# Patient Record
Sex: FEMALE | Race: WHITE | HISPANIC OR LATINO | Employment: FULL TIME | URBAN - METROPOLITAN AREA
[De-identification: names, ages, dates, MRNs, and addresses within clinical notes are randomized per-mention and may not be internally consistent; named-entity substitution may affect disease eponyms.]

---

## 2023-08-24 ENCOUNTER — TELEPHONE (OUTPATIENT)
Dept: PERINATAL CARE | Facility: OTHER | Age: 28
End: 2023-08-24

## 2023-11-20 PROBLEM — Z3A.38 38 WEEKS GESTATION OF PREGNANCY: Status: ACTIVE | Noted: 2023-06-21

## 2023-11-24 PROBLEM — B95.1 POSITIVE GBS TEST: Status: ACTIVE | Noted: 2023-11-24

## 2023-11-27 PROBLEM — Z3A.39 39 WEEKS GESTATION OF PREGNANCY: Status: ACTIVE | Noted: 2023-06-21

## 2023-12-03 ENCOUNTER — HOSPITAL ENCOUNTER (INPATIENT)
Facility: HOSPITAL | Age: 28
LOS: 2 days | Discharge: HOME | DRG: 560 | End: 2023-12-05
Attending: OBSTETRICS & GYNECOLOGY | Admitting: OBSTETRICS & GYNECOLOGY
Payer: COMMERCIAL

## 2023-12-03 ENCOUNTER — ANESTHESIA (INPATIENT)
Dept: ANESTHESIOLOGY | Facility: HOSPITAL | Age: 28
DRG: 560 | End: 2023-12-03
Payer: COMMERCIAL

## 2023-12-03 ENCOUNTER — ANESTHESIA EVENT (INPATIENT)
Dept: LABOR AND DELIVERY | Facility: HOSPITAL | Age: 28
DRG: 560 | End: 2023-12-03
Payer: COMMERCIAL

## 2023-12-03 ENCOUNTER — HOSPITAL ENCOUNTER (OUTPATIENT)
Dept: LABOR AND DELIVERY | Facility: HOSPITAL | Age: 28
Discharge: HOME | DRG: 560 | End: 2023-12-03
Payer: COMMERCIAL

## 2023-12-03 ENCOUNTER — ANESTHESIA EVENT (INPATIENT)
Dept: ANESTHESIOLOGY | Facility: HOSPITAL | Age: 28
DRG: 560 | End: 2023-12-03
Payer: COMMERCIAL

## 2023-12-03 DIAGNOSIS — Z3A.39 39 WEEKS GESTATION OF PREGNANCY: Primary | ICD-10-CM

## 2023-12-03 PROBLEM — O14.13 SEVERE PRE-ECLAMPSIA IN THIRD TRIMESTER: Status: ACTIVE | Noted: 2023-12-03

## 2023-12-03 PROBLEM — R87.612 LGSIL ON PAP SMEAR OF CERVIX: Status: ACTIVE | Noted: 2019-09-24

## 2023-12-03 PROBLEM — Z34.90 ENCOUNTER FOR INDUCTION OF LABOR: Status: ACTIVE | Noted: 2023-12-03

## 2023-12-03 LAB
ABO GROUP BLD: NORMAL
ALBUMIN SERPL BCP-MCNC: 3.4 G/DL (ref 3.5–5)
ALBUMIN SERPL BCP-MCNC: 3.5 G/DL (ref 3.5–5)
ALP SERPL-CCNC: 174 U/L (ref 34–104)
ALP SERPL-CCNC: 183 U/L (ref 34–104)
ALT SERPL W P-5'-P-CCNC: 10 U/L (ref 7–52)
ALT SERPL W P-5'-P-CCNC: 10 U/L (ref 7–52)
ANION GAP SERPL CALCULATED.3IONS-SCNC: 11 MMOL/L
ANION GAP SERPL CALCULATED.3IONS-SCNC: 8 MMOL/L
AST SERPL W P-5'-P-CCNC: 16 U/L (ref 13–39)
AST SERPL W P-5'-P-CCNC: 22 U/L (ref 13–39)
BASE EXCESS BLDCOA CALC-SCNC: -6.2 MMOL/L (ref 3–11)
BASE EXCESS BLDCOV CALC-SCNC: -5 MMOL/L (ref 1–9)
BILIRUB SERPL-MCNC: 0.32 MG/DL (ref 0.2–1)
BILIRUB SERPL-MCNC: 0.44 MG/DL (ref 0.2–1)
BLD GP AB SCN SERPL QL: NEGATIVE
BUN SERPL-MCNC: 10 MG/DL (ref 5–25)
BUN SERPL-MCNC: 10 MG/DL (ref 5–25)
CALCIUM ALBUM COR SERPL-MCNC: 9.4 MG/DL (ref 8.3–10.1)
CALCIUM SERPL-MCNC: 8.9 MG/DL (ref 8.4–10.2)
CALCIUM SERPL-MCNC: 8.9 MG/DL (ref 8.4–10.2)
CHLORIDE SERPL-SCNC: 104 MMOL/L (ref 96–108)
CHLORIDE SERPL-SCNC: 106 MMOL/L (ref 96–108)
CO2 SERPL-SCNC: 17 MMOL/L (ref 21–32)
CO2 SERPL-SCNC: 19 MMOL/L (ref 21–32)
CREAT SERPL-MCNC: 0.52 MG/DL (ref 0.6–1.3)
CREAT SERPL-MCNC: 0.65 MG/DL (ref 0.6–1.3)
CREAT UR-MCNC: 69 MG/DL
ERYTHROCYTE [DISTWIDTH] IN BLOOD BY AUTOMATED COUNT: 14.8 % (ref 11.6–15.1)
ERYTHROCYTE [DISTWIDTH] IN BLOOD BY AUTOMATED COUNT: 14.8 % (ref 11.6–15.1)
GFR SERPL CREATININE-BSD FRML MDRD: 122 ML/MIN/1.73SQ M
GFR SERPL CREATININE-BSD FRML MDRD: 131 ML/MIN/1.73SQ M
GLUCOSE SERPL-MCNC: 101 MG/DL (ref 65–140)
GLUCOSE SERPL-MCNC: 124 MG/DL (ref 65–140)
GLUCOSE SERPL-MCNC: 126 MG/DL (ref 65–140)
GLUCOSE SERPL-MCNC: 64 MG/DL (ref 65–140)
GLUCOSE SERPL-MCNC: 66 MG/DL (ref 65–140)
GLUCOSE SERPL-MCNC: 68 MG/DL (ref 65–140)
GLUCOSE SERPL-MCNC: 86 MG/DL (ref 65–140)
HCO3 BLDCOA-SCNC: 21.1 MMOL/L (ref 17.3–27.3)
HCO3 BLDCOV-SCNC: 21.6 MMOL/L (ref 12.2–28.6)
HCT VFR BLD AUTO: 31.8 % (ref 34.8–46.1)
HCT VFR BLD AUTO: 32.8 % (ref 34.8–46.1)
HGB BLD-MCNC: 10.4 G/DL (ref 11.5–15.4)
HGB BLD-MCNC: 10.6 G/DL (ref 11.5–15.4)
HOLD SPECIMEN: NORMAL
MAGNESIUM SERPL-MCNC: 4.6 MG/DL (ref 1.9–2.7)
MCH RBC QN AUTO: 27.4 PG (ref 26.8–34.3)
MCH RBC QN AUTO: 27.5 PG (ref 26.8–34.3)
MCHC RBC AUTO-ENTMCNC: 32.3 G/DL (ref 31.4–37.4)
MCHC RBC AUTO-ENTMCNC: 32.7 G/DL (ref 31.4–37.4)
MCV RBC AUTO: 84 FL (ref 82–98)
MCV RBC AUTO: 85 FL (ref 82–98)
O2 CT VFR BLDCOA CALC: 12.6 ML/DL
OXYHGB MFR BLDCOA: 54 %
OXYHGB MFR BLDCOV: 56.5 %
PCO2 BLDCOA: 47.7 MM[HG] (ref 30–60)
PCO2 BLDCOV: 45.6 MM HG (ref 27–43)
PH BLDCOA: 7.26 [PH] (ref 7.23–7.43)
PH BLDCOV: 7.29 [PH] (ref 7.19–7.49)
PLATELET # BLD AUTO: 263 THOUSANDS/UL (ref 149–390)
PLATELET # BLD AUTO: 282 THOUSANDS/UL (ref 149–390)
PMV BLD AUTO: 10.4 FL (ref 8.9–12.7)
PMV BLD AUTO: 10.7 FL (ref 8.9–12.7)
PO2 BLDCOA: 24.3 MM HG (ref 5–25)
PO2 BLDCOV: 25.6 MM HG (ref 15–45)
POTASSIUM SERPL-SCNC: 3.6 MMOL/L (ref 3.5–5.3)
POTASSIUM SERPL-SCNC: 4.4 MMOL/L (ref 3.5–5.3)
PROT SERPL-MCNC: 6.6 G/DL (ref 6.4–8.4)
PROT SERPL-MCNC: 6.7 G/DL (ref 6.4–8.4)
PROT UR-MCNC: 33 MG/DL
PROT/CREAT UR: 0.48 MG/G{CREAT} (ref 0–0.1)
RBC # BLD AUTO: 3.8 MILLION/UL (ref 3.81–5.12)
RBC # BLD AUTO: 3.85 MILLION/UL (ref 3.81–5.12)
RH BLD: POSITIVE
SAO2 % BLDCOV: 13 ML/DL
SODIUM SERPL-SCNC: 132 MMOL/L (ref 135–147)
SODIUM SERPL-SCNC: 133 MMOL/L (ref 135–147)
SPECIMEN EXPIRATION DATE: NORMAL
WBC # BLD AUTO: 11.35 THOUSAND/UL (ref 4.31–10.16)
WBC # BLD AUTO: 19.72 THOUSAND/UL (ref 4.31–10.16)

## 2023-12-03 PROCEDURE — 86922 COMPATIBILITY TEST ANTIGLOB: CPT

## 2023-12-03 PROCEDURE — 86850 RBC ANTIBODY SCREEN: CPT | Performed by: OBSTETRICS & GYNECOLOGY

## 2023-12-03 PROCEDURE — 10907ZC DRAINAGE OF AMNIOTIC FLUID, THERAPEUTIC FROM PRODUCTS OF CONCEPTION, VIA NATURAL OR ARTIFICIAL OPENING: ICD-10-PCS | Performed by: OBSTETRICS & GYNECOLOGY

## 2023-12-03 PROCEDURE — 86900 BLOOD TYPING SEROLOGIC ABO: CPT | Performed by: OBSTETRICS & GYNECOLOGY

## 2023-12-03 PROCEDURE — 85027 COMPLETE CBC AUTOMATED: CPT

## 2023-12-03 PROCEDURE — 86921 COMPATIBILITY TEST INCUBATE: CPT

## 2023-12-03 PROCEDURE — 84156 ASSAY OF PROTEIN URINE: CPT

## 2023-12-03 PROCEDURE — 3E033VJ INTRODUCTION OF OTHER HORMONE INTO PERIPHERAL VEIN, PERCUTANEOUS APPROACH: ICD-10-PCS | Performed by: OBSTETRICS & GYNECOLOGY

## 2023-12-03 PROCEDURE — 80053 COMPREHEN METABOLIC PANEL: CPT

## 2023-12-03 PROCEDURE — 82570 ASSAY OF URINE CREATININE: CPT

## 2023-12-03 PROCEDURE — 82805 BLOOD GASES W/O2 SATURATION: CPT | Performed by: OBSTETRICS & GYNECOLOGY

## 2023-12-03 PROCEDURE — 88307 TISSUE EXAM BY PATHOLOGIST: CPT | Performed by: PATHOLOGY

## 2023-12-03 PROCEDURE — 4A1HXCZ MONITORING OF PRODUCTS OF CONCEPTION, CARDIAC RATE, EXTERNAL APPROACH: ICD-10-PCS | Performed by: OBSTETRICS & GYNECOLOGY

## 2023-12-03 PROCEDURE — 83735 ASSAY OF MAGNESIUM: CPT

## 2023-12-03 PROCEDURE — 82948 REAGENT STRIP/BLOOD GLUCOSE: CPT

## 2023-12-03 PROCEDURE — 59409 OBSTETRICAL CARE: CPT | Performed by: OBSTETRICS & GYNECOLOGY

## 2023-12-03 PROCEDURE — 86780 TREPONEMA PALLIDUM: CPT

## 2023-12-03 PROCEDURE — 86901 BLOOD TYPING SEROLOGIC RH(D): CPT | Performed by: OBSTETRICS & GYNECOLOGY

## 2023-12-03 PROCEDURE — NC001 PR NO CHARGE: Performed by: OBSTETRICS & GYNECOLOGY

## 2023-12-03 RX ORDER — OXYTOCIN/RINGER'S LACTATE 30/500 ML
250 PLASTIC BAG, INJECTION (ML) INTRAVENOUS ONCE
Status: DISCONTINUED | OUTPATIENT
Start: 2023-12-03 | End: 2023-12-05 | Stop reason: HOSPADM

## 2023-12-03 RX ORDER — ESCITALOPRAM OXALATE 10 MG/1
10 TABLET ORAL DAILY
Status: DISCONTINUED | OUTPATIENT
Start: 2023-12-03 | End: 2023-12-05 | Stop reason: HOSPADM

## 2023-12-03 RX ORDER — DOCUSATE SODIUM 100 MG/1
100 CAPSULE, LIQUID FILLED ORAL 2 TIMES DAILY
Status: DISCONTINUED | OUTPATIENT
Start: 2023-12-03 | End: 2023-12-05 | Stop reason: HOSPADM

## 2023-12-03 RX ORDER — MAGNESIUM SULFATE HEPTAHYDRATE 40 MG/ML
4 INJECTION, SOLUTION INTRAVENOUS ONCE
Status: COMPLETED | OUTPATIENT
Start: 2023-12-03 | End: 2023-12-03

## 2023-12-03 RX ORDER — ALBUTEROL SULFATE 90 UG/1
2 AEROSOL, METERED RESPIRATORY (INHALATION) EVERY 4 HOURS PRN
Status: DISCONTINUED | OUTPATIENT
Start: 2023-12-03 | End: 2023-12-05 | Stop reason: HOSPADM

## 2023-12-03 RX ORDER — DIPHENHYDRAMINE HCL 25 MG
25 TABLET ORAL EVERY 6 HOURS PRN
Status: DISCONTINUED | OUTPATIENT
Start: 2023-12-03 | End: 2023-12-05 | Stop reason: HOSPADM

## 2023-12-03 RX ORDER — IBUPROFEN 600 MG/1
600 TABLET ORAL EVERY 6 HOURS
Status: DISCONTINUED | OUTPATIENT
Start: 2023-12-03 | End: 2023-12-05 | Stop reason: HOSPADM

## 2023-12-03 RX ORDER — ONDANSETRON 2 MG/ML
4 INJECTION INTRAMUSCULAR; INTRAVENOUS EVERY 4 HOURS PRN
Status: DISCONTINUED | OUTPATIENT
Start: 2023-12-03 | End: 2023-12-05 | Stop reason: HOSPADM

## 2023-12-03 RX ORDER — SIMETHICONE 80 MG
80 TABLET,CHEWABLE ORAL 4 TIMES DAILY PRN
Status: DISCONTINUED | OUTPATIENT
Start: 2023-12-03 | End: 2023-12-05 | Stop reason: HOSPADM

## 2023-12-03 RX ORDER — OXYCODONE HYDROCHLORIDE 5 MG/1
5 TABLET ORAL
Status: DISCONTINUED | OUTPATIENT
Start: 2023-12-03 | End: 2023-12-05 | Stop reason: HOSPADM

## 2023-12-03 RX ORDER — FENTANYL CITRATE 50 UG/ML
INJECTION, SOLUTION INTRAMUSCULAR; INTRAVENOUS
Status: COMPLETED
Start: 2023-12-03 | End: 2023-12-03

## 2023-12-03 RX ORDER — SODIUM CHLORIDE, SODIUM LACTATE, POTASSIUM CHLORIDE, CALCIUM CHLORIDE 600; 310; 30; 20 MG/100ML; MG/100ML; MG/100ML; MG/100ML
125 INJECTION, SOLUTION INTRAVENOUS CONTINUOUS
Status: DISCONTINUED | OUTPATIENT
Start: 2023-12-03 | End: 2023-12-05 | Stop reason: HOSPADM

## 2023-12-03 RX ORDER — MAGNESIUM SULFATE HEPTAHYDRATE 40 MG/ML
2 INJECTION, SOLUTION INTRAVENOUS CONTINUOUS
Status: DISPENSED | OUTPATIENT
Start: 2023-12-03 | End: 2023-12-04

## 2023-12-03 RX ORDER — LABETALOL HYDROCHLORIDE 5 MG/ML
20 INJECTION, SOLUTION INTRAVENOUS ONCE
Status: COMPLETED | OUTPATIENT
Start: 2023-12-03 | End: 2023-12-03

## 2023-12-03 RX ORDER — SENNOSIDES 8.6 MG
1 TABLET ORAL DAILY
Status: DISCONTINUED | OUTPATIENT
Start: 2023-12-04 | End: 2023-12-05 | Stop reason: HOSPADM

## 2023-12-03 RX ORDER — LIDOCAINE HYDROCHLORIDE AND EPINEPHRINE 15; 5 MG/ML; UG/ML
INJECTION, SOLUTION EPIDURAL
Status: COMPLETED | OUTPATIENT
Start: 2023-12-03 | End: 2023-12-03

## 2023-12-03 RX ORDER — CALCIUM GLUCONATE 94 MG/ML
1 INJECTION, SOLUTION INTRAVENOUS ONCE AS NEEDED
Status: DISCONTINUED | OUTPATIENT
Start: 2023-12-03 | End: 2023-12-05 | Stop reason: HOSPADM

## 2023-12-03 RX ORDER — BUPIVACAINE HYDROCHLORIDE 2.5 MG/ML
30 INJECTION, SOLUTION EPIDURAL; INFILTRATION; INTRACAUDAL ONCE AS NEEDED
Status: DISCONTINUED | OUTPATIENT
Start: 2023-12-03 | End: 2023-12-05 | Stop reason: HOSPADM

## 2023-12-03 RX ORDER — OXYTOCIN/RINGER'S LACTATE 30/500 ML
1-30 PLASTIC BAG, INJECTION (ML) INTRAVENOUS
Status: DISCONTINUED | OUTPATIENT
Start: 2023-12-03 | End: 2023-12-03

## 2023-12-03 RX ORDER — ROPIVACAINE HYDROCHLORIDE 2 MG/ML
INJECTION, SOLUTION EPIDURAL; INFILTRATION; PERINEURAL CONTINUOUS PRN
Status: DISCONTINUED | OUTPATIENT
Start: 2023-12-03 | End: 2023-12-03 | Stop reason: HOSPADM

## 2023-12-03 RX ORDER — MAGNESIUM SULFATE HEPTAHYDRATE 40 MG/ML
2 INJECTION, SOLUTION INTRAVENOUS ONCE
Status: COMPLETED | OUTPATIENT
Start: 2023-12-03 | End: 2023-12-03

## 2023-12-03 RX ORDER — CALCIUM CARBONATE 500 MG/1
1000 TABLET, CHEWABLE ORAL DAILY PRN
Status: DISCONTINUED | OUTPATIENT
Start: 2023-12-03 | End: 2023-12-05 | Stop reason: HOSPADM

## 2023-12-03 RX ORDER — FENTANYL CITRATE 50 UG/ML
INJECTION, SOLUTION INTRAMUSCULAR; INTRAVENOUS AS NEEDED
Status: DISCONTINUED | OUTPATIENT
Start: 2023-12-03 | End: 2023-12-03 | Stop reason: HOSPADM

## 2023-12-03 RX ORDER — ACETAMINOPHEN 325 MG/1
650 TABLET ORAL EVERY 4 HOURS PRN
Status: DISCONTINUED | OUTPATIENT
Start: 2023-12-03 | End: 2023-12-05 | Stop reason: HOSPADM

## 2023-12-03 RX ORDER — ROPIVACAINE HYDROCHLORIDE 2 MG/ML
INJECTION, SOLUTION EPIDURAL; INFILTRATION; PERINEURAL AS NEEDED
Status: DISCONTINUED | OUTPATIENT
Start: 2023-12-03 | End: 2023-12-03 | Stop reason: HOSPADM

## 2023-12-03 RX ORDER — DIAPER,BRIEF,INFANT-TODD,DISP
1 EACH MISCELLANEOUS DAILY PRN
Status: DISCONTINUED | OUTPATIENT
Start: 2023-12-03 | End: 2023-12-05 | Stop reason: HOSPADM

## 2023-12-03 RX ADMIN — ROPIVACAINE HYDROCHLORIDE 5 ML: 2 INJECTION, SOLUTION EPIDURAL; INFILTRATION; PERINEURAL at 18:52

## 2023-12-03 RX ADMIN — SODIUM CHLORIDE 2.5 MILLION UNITS: 9 INJECTION, SOLUTION INTRAVENOUS at 17:24

## 2023-12-03 RX ADMIN — MAGNESIUM SULFATE IN WATER 4 G: 40 INJECTION, SOLUTION INTRAVENOUS at 19:02

## 2023-12-03 RX ADMIN — BENZOCAINE AND LEVOMENTHOL 1 APPLICATION: 200; 5 SPRAY TOPICAL at 22:22

## 2023-12-03 RX ADMIN — SODIUM CHLORIDE 2.5 MILLION UNITS: 9 INJECTION, SOLUTION INTRAVENOUS at 13:34

## 2023-12-03 RX ADMIN — FENTANYL CITRATE 100 MCG: 50 INJECTION INTRAMUSCULAR; INTRAVENOUS at 15:48

## 2023-12-03 RX ADMIN — SODIUM CHLORIDE, SODIUM LACTATE, POTASSIUM CHLORIDE, AND CALCIUM CHLORIDE 125 ML/HR: .6; .31; .03; .02 INJECTION, SOLUTION INTRAVENOUS at 09:08

## 2023-12-03 RX ADMIN — ROPIVACAINE HYDROCHLORIDE 7 ML/HR: 2 INJECTION, SOLUTION EPIDURAL; INFILTRATION at 13:37

## 2023-12-03 RX ADMIN — ROPIVACAINE HYDROCHLORIDE 4 ML: 2 INJECTION, SOLUTION EPIDURAL; INFILTRATION; PERINEURAL at 15:50

## 2023-12-03 RX ADMIN — ROPIVACAINE HYDROCHLORIDE: 2 INJECTION, SOLUTION EPIDURAL; INFILTRATION at 13:42

## 2023-12-03 RX ADMIN — ROPIVACAINE HYDROCHLORIDE 4 ML: 2 INJECTION, SOLUTION EPIDURAL; INFILTRATION; PERINEURAL at 13:34

## 2023-12-03 RX ADMIN — LIDOCAINE HYDROCHLORIDE AND EPINEPHRINE 2 ML: 15; 5 INJECTION, SOLUTION EPIDURAL at 13:31

## 2023-12-03 RX ADMIN — WITCH HAZEL 1 PAD: 500 SOLUTION RECTAL; TOPICAL at 22:22

## 2023-12-03 RX ADMIN — Medication 2 MILLI-UNITS/MIN: at 10:01

## 2023-12-03 RX ADMIN — LABETALOL HYDROCHLORIDE 20 MG: 5 INJECTION, SOLUTION INTRAVENOUS at 19:02

## 2023-12-03 RX ADMIN — ROPIVACAINE HYDROCHLORIDE 3 ML: 2 INJECTION, SOLUTION EPIDURAL; INFILTRATION; PERINEURAL at 13:31

## 2023-12-03 RX ADMIN — SODIUM CHLORIDE 5 MILLION UNITS: 0.9 INJECTION, SOLUTION INTRAVENOUS at 09:17

## 2023-12-03 RX ADMIN — DOCUSATE SODIUM 100 MG: 100 CAPSULE, LIQUID FILLED ORAL at 21:03

## 2023-12-03 RX ADMIN — ROPIVACAINE HYDROCHLORIDE 5 ML: 2 INJECTION, SOLUTION EPIDURAL; INFILTRATION; PERINEURAL at 18:54

## 2023-12-03 RX ADMIN — ROPIVACAINE HYDROCHLORIDE: 2 INJECTION, SOLUTION EPIDURAL; INFILTRATION at 18:17

## 2023-12-03 RX ADMIN — LIDOCAINE HYDROCHLORIDE AND EPINEPHRINE 3 ML: 15; 5 INJECTION, SOLUTION EPIDURAL at 13:28

## 2023-12-03 RX ADMIN — ROPIVACAINE HYDROCHLORIDE 4 ML: 2 INJECTION, SOLUTION EPIDURAL; INFILTRATION; PERINEURAL at 15:48

## 2023-12-03 RX ADMIN — IBUPROFEN 600 MG: 600 TABLET, FILM COATED ORAL at 20:42

## 2023-12-03 RX ADMIN — ONDANSETRON 4 MG: 2 INJECTION INTRAMUSCULAR; INTRAVENOUS at 11:09

## 2023-12-03 RX ADMIN — ESCITALOPRAM OXALATE 10 MG: 10 TABLET ORAL at 09:17

## 2023-12-03 RX ADMIN — MAGNESIUM SULFATE HEPTAHYDRATE 2 G: 40 INJECTION, SOLUTION INTRAVENOUS at 19:33

## 2023-12-03 RX ADMIN — MAGNESIUM SULFATE IN WATER 2 G/HR: 20 INJECTION, SOLUTION INTRAVENOUS at 19:46

## 2023-12-03 NOTE — OB LABOR/OXYTOCIN SAFETY PROGRESS
Oxytocin Safety Progress Check Note - Bob Loya 33 y.o. female MRN: [id number]    Unit/Bed#: L&D 324-01 Encounter: [id number]    Dose (courtney-units/min) Oxytocin: 8 courtney-units/min  Contraction Frequency (minutes): 1-3  Contraction Quality: Moderate  Tachysystole: No   Cervical Dilation: 5-6        Cervical Effacement: 80  Fetal Station: -1  Baseline Rate: 145 bpm     FHR Category: 1               Vital Signs:   Vitals:    12/03/23 1500   BP:    Pulse:    Resp: 18   Temp: 98.1 °F (36.7 °C)       Notes/comments:   Patient uncomfortable with pressure.  Calling anesthesia to redose epidural.      Andreia Briggs MD 12/3/2023 3:40 PM

## 2023-12-03 NOTE — H&P
[address] 33 y.o. female MRN: [id number]  Unit/Bed#: L&D 324-01 Encounter: [id number]    Assessment: 33 y.o.  at 39w6d admitted for induction of labor. SVE: Cervical Dilation: 4  Cervical Effacement: 60  Cervical Consistency: Soft  Fetal Station: -2  Position: Unknown  FHT:  Clinical EFW: 76% ; Cephalic confirmed by ultrasound  GBS status: pos   Postpartum contraception plan: OCPs    Plan:   Encounter for induction of labor  Assessment & Plan  Admit   T&S, CBC, RPR  CLD  IV fluids  GBS prophylaxis is needed, PCN ordered  Induction with pitocin      Diet controlled gestational diabetes mellitus (GDM) in third trimester  Assessment & Plan  Lab Results   Component Value Date    HGBA1C 4.9 2022       Recent Labs     23  0913   POCGLU 124       Blood Sugar Average: Last 72 hrs:  (P) 124    As per patient, blood sugars were well controlled at home, but was not compliant with reporting them  First ; recheck in 1 hour    Obesity affecting pregnancy in third trimester  Assessment & Plan  BMI 42    Depression affecting pregnancy in third trimester, antepartum  Assessment & Plan  Home lexapro ordered    39 weeks gestation of pregnancy  Assessment & Plan  -Prenatal panel completed  -28 week labs completed  -EFW 76%    LGSIL on Pap smear of cervix  Assessment & Plan  Colpo on 23: Lesions appeared low grade, no biopsies taken      Discussed case and plan w/ Dr. Loan Finch      Chief Complaint: here for induction of labor    HPI: Aniya Keene is a 33 y.o. Carol Evens with an SOUMYA of 2023, by Ultrasound at 39w6d who is being admitted for induction. She denies having uterine contractions, has no LOF, and reports no VB. She states she has felt good FM. She states that she was not complaint with reporting blood sugars, but they were normal at home. She would like an epidural before AROM. Patient Active Problem List   Diagnosis    Obesity (BMI 30-39. 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 of cervix    Migraine without aura and without status migrainosus, not intractable    Chronic midline low back pain without sciatica    Chronic fatigue    Dry eyes    Vitamin D deficiency    Nephrocalcinosis    Anxiety    Myofascial pain    Bronchitis    39 weeks gestation of pregnancy    Depression affecting pregnancy in third trimester, antepartum    Obesity affecting pregnancy in third trimester    Diet controlled gestational diabetes mellitus (GDM) in third trimester    Positive GBS test    Encounter for induction of labor       Baby complications/comments: none    Review of Systems   Constitutional:  Negative for chills and fever. HENT:  Negative for ear pain and sore throat. Eyes:  Negative for pain and visual disturbance. Respiratory:  Negative for cough and shortness of breath. Cardiovascular:  Negative for chest pain, palpitations and leg swelling. Gastrointestinal:  Negative for abdominal pain, nausea and vomiting. Genitourinary:  Negative for dysuria, hematuria, pelvic pain, urgency, vaginal bleeding and vaginal discharge. Musculoskeletal:  Negative for arthralgias and back pain. Skin:  Negative for color change and rash. Neurological:  Negative for dizziness, seizures, syncope, light-headedness and headaches. All other systems reviewed and are negative.       OB Hx:  OB History    Para Term  AB Living   3 1 1   1 1   SAB IAB Ectopic Multiple Live Births     1     1      # Outcome Date GA Lbr Cornelius/2nd Weight Sex Delivery Anes PTL Lv   3 Current            2 Term 12 37w0d  2920 g (6 lb 7 oz) F Vag-Spont EPI N LAURA   1 IAB               Obstetric Comments   Menarche 12   Cycles Q 28d   Vaginal birth x 1       Past Medical Hx:  Past Medical History:   Diagnosis Date    Abnormal Pap smear of cervix     2021 Colpo: TORRES 1; 2022 pap negative    Depression     Frequent headaches     Genetic screening     2023-CFCS negative    Immunization, varicella     Migraine      (normal spontaneous vaginal delivery)     Varicella     states she was vaccinated but blood work from 3/2021 says non immune       Past Surgical hx:  Past Surgical History:   Procedure Laterality Date    INDUCED          Social Hx:  Alcohol use: no  Tobacco use: no  Other substance use: no    No Known Allergies    Medications Prior to Admission   Medication    acetaminophen (TYLENOL) 500 mg tablet    albuterol (Ventolin HFA) 90 mcg/act inhaler    aspirin (ECOTRIN LOW STRENGTH) 81 mg EC tablet    Blood Glucose Monitoring Suppl (OneTouch Verio Reflect) w/Device KIT    cyclobenzaprine (FLEXERIL) 10 mg tablet    escitalopram (Lexapro) 10 mg tablet    ferrous sulfate 324 (65 Fe) mg    glucose blood (OneTouch Verio) test strip    loratadine (CLARITIN) 10 mg tablet    melatonin 3 mg    ondansetron (ZOFRAN-ODT) 4 mg disintegrating tablet    OneTouch Delica Lancets 90N MISC    Prenatal Vit-Fe Fumarate-FA (Trinatal Rx 1) 60-1 MG TABS       Objective:  Temp:  [98.1 °F (36.7 °C)] 98.1 °F (36.7 °C)  HR:  [] 96  Resp:  [18] 18  BP: (124-141)/(83-90) 124/83  There is no height or weight on file to calculate BMI. Physical Exam:  Physical Exam  Constitutional:       Appearance: Normal appearance. Cardiovascular:      Rate and Rhythm: Normal rate and regular rhythm. Heart sounds: No murmur heard. No friction rub. No gallop. Pulmonary:      Effort: Pulmonary effort is normal. No respiratory distress. Breath sounds: No wheezing. Abdominal:      Palpations: Abdomen is soft. Tenderness: There is no abdominal tenderness. Musculoskeletal:         General: No swelling or tenderness. Neurological:      Mental Status: She is alert and oriented to person, place, and time. Skin:     General: Skin is warm and dry. Psychiatric:         Mood and Affect: Mood normal.   Vitals reviewed.             FHT:  Baseline Rate: 150 bpm  FHR Category: Category I    TOCO:   Contraction Frequency (minutes): irregular  Contraction Duration (seconds): irregular  Contraction Quality: Mild    Lab Results   Component Value Date    WBC 11.35 (H) 12/03/2023    HGB 10.4 (L) 12/03/2023    HCT 31.8 (L) 12/03/2023     12/03/2023     Lab Results   Component Value Date    K 3.6 12/21/2022     12/21/2022    CO2 28 12/21/2022    BUN 15 12/21/2022    CREATININE 0.63 12/21/2022    AST 18 04/04/2022    ALT 25 04/04/2022     Prenatal Labs: Reviewed      Bloodtype:B  Rh Factor (no units)   Date Value   04/28/2023 Positive     Strep Grp B PCR (no units)   Date Value   11/20/2023 Positive (A)     HIV-1/HIV-2 Ab (no units)   Date Value   11/15/2022 Non-Reactive     Rubella IgG Quant (IU/mL)   Date Value   04/28/2023 37.1     RPR NR  RPR (no units)   Date Value   11/15/2022 Non-Reactive     Hepatitis B Surface Ag (no units)   Date Value   04/28/2023 Non-reactive     Chlamydia, DNA Probe (no units)   Date Value   10/24/2018 C. trachomatis Amplified DNA Negative     Chlamydia trachomatis, DNA Probe (no units)   Date Value   05/17/2023 Negative     N gonorrhoeae, DNA Probe (no units)   Date Value   05/17/2023 Negative   10/24/2018 N. gonorrhoeae Amplified DNA Negative     Glucose (mg/dL)   Date Value   09/22/2023 180 (H)     GLUCOSE 3 HOUR: elevated  Platelets (Thousands/uL)   Date Value   12/03/2023 282   09/22/2023 251   04/28/2023 283   04/04/2022 251     Hemoglobin (g/dL)   Date Value   12/03/2023 10.4 (L)   09/22/2023 10.0 (L)   04/28/2023 11.4 (L)   04/04/2022 11.1 (L)     >2 Midnights  INPATIENT       Signature/Title:  Andreia Briggs MD  Date: 12/3/2023  Time: 9:47 AM

## 2023-12-03 NOTE — ANESTHESIA PREPROCEDURE EVALUATION
Procedure:  LABOR ANALGESIA    Relevant Problems   CARDIO   (+) Migraine without aura and without status migrainosus, not intractable      /RENAL   (+) Nephrocalcinosis      GYN   (+) 39 weeks gestation of pregnancy      MUSCULOSKELETAL   (+) Chronic midline low back pain without sciatica      NEURO/PSYCH   (+) Anxiety   (+) Chronic midline low back pain without sciatica   (+) Depression affecting pregnancy in third trimester, antepartum   (+) Migraine without aura and without status migrainosus, not intractable      Other   (+) Morbid obesity with BMI of 40.0-44.9, adult (HCC)        Physical Exam    Airway    Mallampati score: II  TM Distance: >3 FB  Neck ROM: full     Dental   No notable dental hx     Cardiovascular  Cardiovascular exam normal    Pulmonary      Other Findings  post-pubertal.      Anesthesia Plan  ASA Score- 3     Anesthesia Type-     Plan Factors-    Chart reviewed. Existing labs reviewed. Patient summary reviewed. Induction-     Postoperative Plan-     Informed Consent- Anesthetic plan and risks discussed with patient.

## 2023-12-03 NOTE — ASSESSMENT & PLAN NOTE
Admit   T&S, CBC, RPR  CLD  IV fluids  GBS prophylaxis is needed, PCN ordered  Induction with pitocin

## 2023-12-03 NOTE — ANESTHESIA PROCEDURE NOTES
Epidural Block    Patient location during procedure: OB/L&D  Start time: 12/3/2023 1:28 PM  Reason for block: at surgeon's request and primary anesthetic  Staffing  Performed by: Kia Eubanks DO  Authorized by: Kia Eubanks DO    Preanesthetic Checklist  Completed: patient identified, IV checked, risks and benefits discussed, surgical consent, monitors and equipment checked, pre-op evaluation and timeout performed  Epidural  Patient position: sitting  Prep: Betadine  Sedation Level: no sedation  Patient monitoring: frequent blood pressure checks, continuous pulse oximetry and heart rate  Approach: midline  Location: lumbar, L3-4  Injection technique: BIRD saline  Needle  Needle type: Tuohy   Needle gauge: 18 G  Needle insertion depth: 6 cm  Catheter type: multi-orifice  Catheter size: 20 G  Catheter at skin depth: 10 cm  Catheter securement method: stabilization device and clear occlusive dressing  Test dose: negativelidocaine-epinephrine (XYLOCAINE-MPF/EPINEPHRINE) 1.5 %-1:200,000 injection 3 mL - Epidural   3 mL - 12/3/2023 1:28:00 PM  Assessment  Sensory level: T10  Number of attempts: 1negative aspiration for CSF, negative aspiration for heme and no paresthesia on injection  patient tolerated the procedure well with no immediate complications

## 2023-12-03 NOTE — OB LABOR/OXYTOCIN SAFETY PROGRESS
Oxytocin Safety Progress Check Note - Hamilton Daniela 33 y.o. female MRN: [id number]    Unit/Bed#: L&D 324-01 Encounter: [id number]    Dose (courtney-units/min) Oxytocin: 8 courtney-units/min  Contraction Frequency (minutes): 2-4  Contraction Quality: Moderate  Tachysystole: No   Cervical Dilation: 4-5        Cervical Effacement: 70  Fetal Station: -1  Baseline Rate: 150 bpm     FHR Category: 1               Vital Signs:   Vitals:    12/03/23 1142   BP: 131/91   Pulse: 85   Resp:    Temp:        Notes/comments:   Patient desires epidural. Wait on AROM until 2 units of blood arrive to the hospital campus.       Vernon Felder MD 12/3/2023 12:50 PM

## 2023-12-03 NOTE — OB LABOR/OXYTOCIN SAFETY PROGRESS
Oxytocin Safety Progress Check Note - Aguila Boles 33 y.o. female MRN: [id number]    Unit/Bed#: L&D 324-01 Encounter: [id number]    Dose (courtney-units/min) Oxytocin: 8 courtney-units/min  Contraction Frequency (minutes): 1-3  Contraction Quality: Moderate  Tachysystole: No   Cervical Dilation: 8        Cervical Effacement: 80  Fetal Station: -1  Baseline Rate: 145 bpm     FHR Category: Cat 1               Vital Signs:   Vitals:    12/03/23 1823   BP: 149/70   Pulse: (!) 107   Resp:    Temp:        Notes/comments:   SVE as above. Patient feeling more pelvic pressure. Anesthesia has previously evaluated her for her low back pain and pelvic pressure. Nonsustained severe range blood pressures noted prior to SVE. Reviewed potential of progression to preeclampsia with severe features and management options.   Discussed with Dr. Favio Martinez DO 12/3/2023 6:40 PM

## 2023-12-03 NOTE — OB LABOR/OXYTOCIN SAFETY PROGRESS
Oxytocin Safety Progress Check Note - Valentine Peer 33 y.o. female MRN: [id number]    Unit/Bed#: L&D 324-01 Encounter: [id number]    Dose (courtney-units/min) Oxytocin: 8 courtney-units/min  Contraction Frequency (minutes): 1-3  Contraction Quality: Moderate  Tachysystole: No   Cervical Dilation: 5        Cervical Effacement: 80  Fetal Station: -1  Baseline Rate: 150 bpm     FHR Category: 1               Vital Signs:   Vitals:    12/03/23 1344   BP: 139/82   Pulse: 100   Resp: 16   Temp: 97.7 °F (36.5 °C)       Notes/comments:   Continue titrating pitocin. AROM meconium. 2 units of blood are now available for patient.       Catie Gonzalez MD 12/3/2023 2:23 PM

## 2023-12-03 NOTE — ANESTHESIA PREPROCEDURE EVALUATION
Procedure:  LABOR INDUCTION    Relevant Problems   CARDIO   (+) Migraine without aura and without status migrainosus, not intractable      /RENAL   (+) Nephrocalcinosis      GYN   (+) 39 weeks gestation of pregnancy      MUSCULOSKELETAL   (+) Chronic midline low back pain without sciatica      NEURO/PSYCH   (+) Anxiety   (+) Chronic midline low back pain without sciatica   (+) Depression affecting pregnancy in third trimester, antepartum   (+) Migraine without aura and without status migrainosus, not intractable      Other   (+) Morbid obesity with BMI of 40.0-44.9, adult [facility])        Physical Exam    Airway    Mallampati score: II  TM Distance: >3 FB  Neck ROM: full     Dental   No notable dental hx     Cardiovascular  Rhythm: regular, Rate: normal, Cardiovascular exam normal    Pulmonary  Pulmonary exam normal Breath sounds clear to auscultation    Other Findings  post-pubertal.      Anesthesia Plan  ASA Score- 3     Anesthesia Type-     Plan Factors-    Chart reviewed. Existing labs reviewed. Patient summary reviewed. Induction-     Postoperative Plan-     Informed Consent- Anesthetic plan and risks discussed with patient.

## 2023-12-03 NOTE — ASSESSMENT & PLAN NOTE
Lab Results   Component Value Date    HGBA1C 4.9 03/30/2022       Recent Labs     12/03/23  1016 12/03/23  1222 12/03/23  1414 12/03/23  1625   POCGLU 86 68 64* 66       Blood Sugar Average: Last 72 hrs:  (P) 81.6    F/u outpatient

## 2023-12-04 DIAGNOSIS — Z13.1 SCREENING FOR DIABETES MELLITUS (DM): ICD-10-CM

## 2023-12-04 DIAGNOSIS — Z86.32 HISTORY OF GESTATIONAL DIABETES MELLITUS (GDM): Primary | ICD-10-CM

## 2023-12-04 LAB
ALBUMIN SERPL BCP-MCNC: 3.1 G/DL (ref 3.5–5)
ALBUMIN SERPL BCP-MCNC: 3.2 G/DL (ref 3.5–5)
ALBUMIN SERPL BCP-MCNC: 3.2 G/DL (ref 3.5–5)
ALBUMIN SERPL BCP-MCNC: 3.5 G/DL (ref 3.5–5)
ALP SERPL-CCNC: 129 U/L (ref 34–104)
ALP SERPL-CCNC: 147 U/L (ref 34–104)
ALP SERPL-CCNC: 151 U/L (ref 34–104)
ALP SERPL-CCNC: 165 U/L (ref 34–104)
ALT SERPL W P-5'-P-CCNC: 11 U/L (ref 7–52)
ALT SERPL W P-5'-P-CCNC: 7 U/L (ref 7–52)
ALT SERPL W P-5'-P-CCNC: 9 U/L (ref 7–52)
ALT SERPL W P-5'-P-CCNC: 9 U/L (ref 7–52)
ANION GAP SERPL CALCULATED.3IONS-SCNC: 11 MMOL/L
ANION GAP SERPL CALCULATED.3IONS-SCNC: 6 MMOL/L
ANION GAP SERPL CALCULATED.3IONS-SCNC: 8 MMOL/L
ANION GAP SERPL CALCULATED.3IONS-SCNC: 8 MMOL/L
AST SERPL W P-5'-P-CCNC: 16 U/L (ref 13–39)
AST SERPL W P-5'-P-CCNC: 16 U/L (ref 13–39)
AST SERPL W P-5'-P-CCNC: 18 U/L (ref 13–39)
AST SERPL W P-5'-P-CCNC: 18 U/L (ref 13–39)
BILIRUB SERPL-MCNC: 0.22 MG/DL (ref 0.2–1)
BILIRUB SERPL-MCNC: 0.23 MG/DL (ref 0.2–1)
BILIRUB SERPL-MCNC: 0.27 MG/DL (ref 0.2–1)
BILIRUB SERPL-MCNC: 0.31 MG/DL (ref 0.2–1)
BUN SERPL-MCNC: 8 MG/DL (ref 5–25)
BUN SERPL-MCNC: 9 MG/DL (ref 5–25)
CALCIUM ALBUM COR SERPL-MCNC: 7.4 MG/DL (ref 8.3–10.1)
CALCIUM ALBUM COR SERPL-MCNC: 7.9 MG/DL (ref 8.3–10.1)
CALCIUM ALBUM COR SERPL-MCNC: 8 MG/DL (ref 8.3–10.1)
CALCIUM SERPL-MCNC: 6.8 MG/DL (ref 8.4–10.2)
CALCIUM SERPL-MCNC: 7.3 MG/DL (ref 8.4–10.2)
CALCIUM SERPL-MCNC: 7.3 MG/DL (ref 8.4–10.2)
CALCIUM SERPL-MCNC: 8.2 MG/DL (ref 8.4–10.2)
CHLORIDE SERPL-SCNC: 104 MMOL/L (ref 96–108)
CHLORIDE SERPL-SCNC: 106 MMOL/L (ref 96–108)
CHLORIDE SERPL-SCNC: 106 MMOL/L (ref 96–108)
CHLORIDE SERPL-SCNC: 107 MMOL/L (ref 96–108)
CO2 SERPL-SCNC: 19 MMOL/L (ref 21–32)
CO2 SERPL-SCNC: 19 MMOL/L (ref 21–32)
CO2 SERPL-SCNC: 23 MMOL/L (ref 21–32)
CO2 SERPL-SCNC: 23 MMOL/L (ref 21–32)
CREAT SERPL-MCNC: 0.59 MG/DL (ref 0.6–1.3)
CREAT SERPL-MCNC: 0.64 MG/DL (ref 0.6–1.3)
CREAT SERPL-MCNC: 0.79 MG/DL (ref 0.6–1.3)
CREAT SERPL-MCNC: 0.91 MG/DL (ref 0.6–1.3)
ERYTHROCYTE [DISTWIDTH] IN BLOOD BY AUTOMATED COUNT: 14.7 % (ref 11.6–15.1)
ERYTHROCYTE [DISTWIDTH] IN BLOOD BY AUTOMATED COUNT: 14.7 % (ref 11.6–15.1)
ERYTHROCYTE [DISTWIDTH] IN BLOOD BY AUTOMATED COUNT: 14.8 % (ref 11.6–15.1)
ERYTHROCYTE [DISTWIDTH] IN BLOOD BY AUTOMATED COUNT: 14.9 % (ref 11.6–15.1)
GFR SERPL CREATININE-BSD FRML MDRD: 102 ML/MIN/1.73SQ M
GFR SERPL CREATININE-BSD FRML MDRD: 122 ML/MIN/1.73SQ M
GFR SERPL CREATININE-BSD FRML MDRD: 126 ML/MIN/1.73SQ M
GFR SERPL CREATININE-BSD FRML MDRD: 86 ML/MIN/1.73SQ M
GLUCOSE SERPL-MCNC: 119 MG/DL (ref 65–140)
GLUCOSE SERPL-MCNC: 128 MG/DL (ref 65–140)
GLUCOSE SERPL-MCNC: 133 MG/DL (ref 65–140)
GLUCOSE SERPL-MCNC: 269 MG/DL (ref 65–140)
GLUCOSE SERPL-MCNC: 90 MG/DL (ref 65–140)
HCT VFR BLD AUTO: 28.3 % (ref 34.8–46.1)
HCT VFR BLD AUTO: 29.1 % (ref 34.8–46.1)
HCT VFR BLD AUTO: 30 % (ref 34.8–46.1)
HCT VFR BLD AUTO: 31.3 % (ref 34.8–46.1)
HGB BLD-MCNC: 10 G/DL (ref 11.5–15.4)
HGB BLD-MCNC: 10.6 G/DL (ref 11.5–15.4)
HGB BLD-MCNC: 9.4 G/DL (ref 11.5–15.4)
HGB BLD-MCNC: 9.5 G/DL (ref 11.5–15.4)
MAGNESIUM SERPL-MCNC: 4.8 MG/DL (ref 1.9–2.7)
MAGNESIUM SERPL-MCNC: 5 MG/DL (ref 1.9–2.7)
MAGNESIUM SERPL-MCNC: 5.2 MG/DL (ref 1.9–2.7)
MAGNESIUM SERPL-MCNC: 5.7 MG/DL (ref 1.9–2.7)
MCH RBC QN AUTO: 27.5 PG (ref 26.8–34.3)
MCH RBC QN AUTO: 27.9 PG (ref 26.8–34.3)
MCH RBC QN AUTO: 27.9 PG (ref 26.8–34.3)
MCH RBC QN AUTO: 28.6 PG (ref 26.8–34.3)
MCHC RBC AUTO-ENTMCNC: 32.6 G/DL (ref 31.4–37.4)
MCHC RBC AUTO-ENTMCNC: 33.2 G/DL (ref 31.4–37.4)
MCHC RBC AUTO-ENTMCNC: 33.3 G/DL (ref 31.4–37.4)
MCHC RBC AUTO-ENTMCNC: 33.9 G/DL (ref 31.4–37.4)
MCV RBC AUTO: 84 FL (ref 82–98)
PLATELET # BLD AUTO: 259 THOUSANDS/UL (ref 149–390)
PLATELET # BLD AUTO: 262 THOUSANDS/UL (ref 149–390)
PLATELET # BLD AUTO: 264 THOUSANDS/UL (ref 149–390)
PLATELET # BLD AUTO: 270 THOUSANDS/UL (ref 149–390)
PMV BLD AUTO: 10.1 FL (ref 8.9–12.7)
PMV BLD AUTO: 10.2 FL (ref 8.9–12.7)
PMV BLD AUTO: 10.4 FL (ref 8.9–12.7)
PMV BLD AUTO: 10.5 FL (ref 8.9–12.7)
POTASSIUM SERPL-SCNC: 3.6 MMOL/L (ref 3.5–5.3)
POTASSIUM SERPL-SCNC: 3.7 MMOL/L (ref 3.5–5.3)
POTASSIUM SERPL-SCNC: 3.7 MMOL/L (ref 3.5–5.3)
POTASSIUM SERPL-SCNC: 4 MMOL/L (ref 3.5–5.3)
PROT SERPL-MCNC: 5.9 G/DL (ref 6.4–8.4)
PROT SERPL-MCNC: 5.9 G/DL (ref 6.4–8.4)
PROT SERPL-MCNC: 6 G/DL (ref 6.4–8.4)
PROT SERPL-MCNC: 6.6 G/DL (ref 6.4–8.4)
RBC # BLD AUTO: 3.37 MILLION/UL (ref 3.81–5.12)
RBC # BLD AUTO: 3.46 MILLION/UL (ref 3.81–5.12)
RBC # BLD AUTO: 3.59 MILLION/UL (ref 3.81–5.12)
RBC # BLD AUTO: 3.71 MILLION/UL (ref 3.81–5.12)
SODIUM SERPL-SCNC: 134 MMOL/L (ref 135–147)
SODIUM SERPL-SCNC: 134 MMOL/L (ref 135–147)
SODIUM SERPL-SCNC: 135 MMOL/L (ref 135–147)
SODIUM SERPL-SCNC: 137 MMOL/L (ref 135–147)
TREPONEMA PALLIDUM IGG+IGM AB [PRESENCE] IN SERUM OR PLASMA BY IMMUNOASSAY: NORMAL
WBC # BLD AUTO: 13.15 THOUSAND/UL (ref 4.31–10.16)
WBC # BLD AUTO: 14.15 THOUSAND/UL (ref 4.31–10.16)
WBC # BLD AUTO: 15.73 THOUSAND/UL (ref 4.31–10.16)
WBC # BLD AUTO: 17.45 THOUSAND/UL (ref 4.31–10.16)

## 2023-12-04 PROCEDURE — 82948 REAGENT STRIP/BLOOD GLUCOSE: CPT

## 2023-12-04 PROCEDURE — 83735 ASSAY OF MAGNESIUM: CPT

## 2023-12-04 PROCEDURE — 80053 COMPREHEN METABOLIC PANEL: CPT

## 2023-12-04 PROCEDURE — 99024 POSTOP FOLLOW-UP VISIT: CPT | Performed by: OBSTETRICS & GYNECOLOGY

## 2023-12-04 PROCEDURE — 85027 COMPLETE CBC AUTOMATED: CPT

## 2023-12-04 RX ADMIN — MAGNESIUM SULFATE IN WATER 2 G/HR: 20 INJECTION, SOLUTION INTRAVENOUS at 06:29

## 2023-12-04 RX ADMIN — DOCUSATE SODIUM 100 MG: 100 CAPSULE, LIQUID FILLED ORAL at 09:12

## 2023-12-04 RX ADMIN — IBUPROFEN 600 MG: 600 TABLET, FILM COATED ORAL at 03:00

## 2023-12-04 RX ADMIN — ESCITALOPRAM OXALATE 10 MG: 10 TABLET ORAL at 09:13

## 2023-12-04 RX ADMIN — IBUPROFEN 600 MG: 600 TABLET, FILM COATED ORAL at 23:26

## 2023-12-04 RX ADMIN — MAGNESIUM SULFATE IN WATER 2 G/HR: 20 INJECTION, SOLUTION INTRAVENOUS at 16:55

## 2023-12-04 RX ADMIN — SODIUM CHLORIDE, SODIUM LACTATE, POTASSIUM CHLORIDE, AND CALCIUM CHLORIDE 125 ML/HR: .6; .31; .03; .02 INJECTION, SOLUTION INTRAVENOUS at 16:55

## 2023-12-04 RX ADMIN — OXYCODONE HYDROCHLORIDE 5 MG: 5 TABLET ORAL at 06:36

## 2023-12-04 RX ADMIN — DOCUSATE SODIUM 100 MG: 100 CAPSULE, LIQUID FILLED ORAL at 17:02

## 2023-12-04 RX ADMIN — ACETAMINOPHEN 325MG 650 MG: 325 TABLET ORAL at 00:59

## 2023-12-04 RX ADMIN — IBUPROFEN 600 MG: 600 TABLET, FILM COATED ORAL at 09:13

## 2023-12-04 RX ADMIN — ACETAMINOPHEN 325MG 650 MG: 325 TABLET ORAL at 20:12

## 2023-12-04 RX ADMIN — IBUPROFEN 600 MG: 600 TABLET, FILM COATED ORAL at 17:02

## 2023-12-04 NOTE — ASSESSMENT & PLAN NOTE
Systolic (49TQE), XZH:015 , Min:114 , WXF:639   Diastolic (28HSA), ROS:20, Min:75, Max:94  Severe features: sustained SRBP in labor, s/p 20 mg IV Labetalol  Initial labs: Plt, CMP wnl, PC 0.48  Cr 0.52 -> 0.65 -> 0.91 ->> 0.64  S/p Magnesium  Asymptomatic  Continue to monitor BP  Not currently on medication

## 2023-12-04 NOTE — PROGRESS NOTES
Obstetrics Progress Note  Mal Dural 33 y.o. female MRN: [id number]  Unit/Bed#: L&D 305-01 Encounter: [id number]    Assessment/Plan:    Postpartum Day #1 s/p , with intrapartum course complicated by preeclampsia with severe features, currently on Magnesium, currently stable. Baby in room. By issue:    *  (spontaneous vaginal delivery)  Assessment & Plan       Continue routine post partum care  Pain well controlled: tylenol/motrin scheduled  Lochia within normal limits: continue to monitor   OOB: as able, encourage ambulation  Passing flatus  Voiding spontaneously  Encourage breastfeeding  Baby in: room  Dispo: anticipate d/c home pending blood pressure management      Preeclampsia with severe features  Assessment & Plan  Systolic (27OOD), DNL:696 , Min:124 , KYX:174   Diastolic (11VNP), DJ, Min:67, Max:103  Severe features: sustained SRBP in labor, s/p 20 mg IV Labetalol  Initial labs: Plt, CMP wnl, PC 0.48  Asymptomatic  Continue q6 labs while on magnesium, Magnesium gtt to be discontinued at  on       Diet controlled gestational diabetes mellitus (GDM) in third trimester  Assessment & Plan  Lab Results   Component Value Date    HGBA1C 4.9 2022       Recent Labs     23  1016 23  1222 23  1414 23  1625   POCGLU 86 68 64* 66       Blood Sugar Average: Last 72 hrs:  (P) 81.6    F/u outpatient    Obesity affecting pregnancy in third trimester  Assessment & Plan  BMI 42    Depression affecting pregnancy in third trimester, antepartum  Assessment & Plan  Home lexapro ordered    LGSIL on Pap smear of cervix  Assessment & Plan  Colpo on 23: Lesions appeared low grade, no biopsies taken        Subjective/Objective     Subjective:   No acute events overnight. Pain: controlled. Tolerating PO: yes. Voiding: yes. Flatus: passing. Ambulating: yes. Chest pain: no. Shortness of breath: no. Leg pain: no. Lochia: within normal limits.  Baby in room, feeding via breast.    Objective:   Vitals:   Temp:  [97.7 °F (36.5 °C)-98.1 °F (36.7 °C)] 98 °F (36.7 °C)  HR:  [] 110  Resp:  [16-18] 18  BP: (124-173)/() 152/91       Intake/Output Summary (Last 24 hours) at 12/4/2023 0553  Last data filed at 12/4/2023 0130  Gross per 24 hour   Intake --   Output 3319 ml   Net -3319 ml       Lab Results   Component Value Date    WBC 17.45 (H) 12/04/2023    HGB 10.6 (L) 12/04/2023    HCT 31.3 (L) 12/04/2023    MCV 84 12/04/2023     12/04/2023       Physical Exam:   General: alert and oriented x3, in no apparent distress  Cardiovascular: regular rate  Pulmonary: normal effort  Abdomen: Soft, non-tender, non-distended, no rebound or guarding.  Uterine fundus firm and non-tender, at the umbilicus  Extremities: Non tender with no edema      Teressa Caraballo MD  PGY-I, OBGYN  12/4/2023, 5:53 AM

## 2023-12-04 NOTE — ASSESSMENT & PLAN NOTE
Continue routine post partum care  Pain well controlled: tylenol/motrin scheduled  Lochia within normal limits: continue to monitor   OOB: as able, encourage ambulation  Passing flatus  Voiding spontaneously  Encourage breastfeeding  Baby in: room  Dispo: anticipate d/c home pending blood pressure management

## 2023-12-04 NOTE — OB LABOR/OXYTOCIN SAFETY PROGRESS
Oxytocin Safety Progress Check Note - Aleah Christianson 33 y.o. female MRN: [id number]    Unit/Bed#: L&D 324-01 Encounter: [id number]    Dose (courtney-units/min) Oxytocin: 8 courtney-units/min  Contraction Frequency (minutes): 1-3  Contraction Quality: Moderate  Tachysystole: No   Cervical Dilation: 8        Cervical Effacement: 80  Fetal Station: -1  Baseline Rate: 145 bpm                    Vital Signs:   Vitals:    12/03/23 1853   BP: 162/91   Pulse: 104   Resp:    Temp:        Notes/comments:   Patient noted to have sustained severe range blood pressures of 173/103 at 1837 hrs. and 162/91 at 1852 hrs. Order placed for 20 mg of IV labetalol and initiation of magnesium infusion.   D/w Dr. Shanthi Jacob,  12/3/2023 7:02 PM

## 2023-12-04 NOTE — ANESTHESIA POSTPROCEDURE EVALUATION
Post-Op Assessment Note    CV Status:  Stable    Pain management: adequate      Post-op block assessment: catheter intact and no complications   Mental Status:  Alert and awake   Hydration Status:  Euvolemic   PONV Controlled:  Controlled   Airway Patency:  Patent     Post Op Vitals Reviewed: Yes    No anethesia notable event occurred.     Staff: Anesthesiologist               BP      Temp      Pulse     Resp      SpO2      /84   Pulse 96   Temp 97.9 °F (36.6 °C) (Oral)   Resp 18   LMP 02/23/2023 (Approximate) Comment: irregular cycles  SpO2 99%

## 2023-12-04 NOTE — OB LABOR/OXYTOCIN SAFETY PROGRESS
Labor Progress Note - Shai Claudio 33 y.o. female MRN: [id number]    Unit/Bed#: L&D 324-01 Encounter: [id number]    Dose (courtney-units/min) Oxytocin: 8 courtney-units/min  Contraction Frequency (minutes): 1-3  Contraction Quality: Moderate  Tachysystole: No   Cervical Dilation: 10  Dilation Complete Date: 12/03/23  Dilation Complete Time: 2004  Cervical Effacement: 100  Fetal Station: 1  Baseline Rate: 145 bpm     FHR Category: I               Vital Signs:   Vitals:    12/03/23 1939   BP: 144/86   Pulse: 95   Resp: 18   Temp: 97.9 °F (36.6 °C)   SpO2: 99%       Notes/comments:   Pt feeling more pressure. Completely dilated, +1 station. Will commence pushing efforts  No further severe range BP's after initial labetalol 20 mg.  Denies HA/visual changes/CP/SOB    FHT currently category I     Hope MD Luisa 12/3/2023 8:04 PM

## 2023-12-04 NOTE — L&D DELIVERY NOTE
Delivery Summary - OB/GYN   Aleah Christianson 33 y.o. female MRN: [id number]  Unit/Bed#: L&D 324-01 Encounter: [id number]    Pre-delivery Diagnosis:   1. 39w6d pregnancy  2. GDMA1  3. Preeclampsia with severe features diagnosed in labor    Post-delivery Diagnosis: same, delivered    Attending: Mike Peres     Assistant(s): none    Procedure: spontaneous vaginal delivery     Anesthesia: epidural    Estimated Blood Loss:  69 mL    Specimens:   1. Arterial and venous cord gases  2. Cord blood  3. Segment of umbilical cord  4. Placenta to pathology      Complications:  None apparent    Findings:  1. Viable male  delivered on 23 at  weighing 7lbs 10.8oz;  Apgar scores of 9 at one minute and 9 at five minutes. 2. Spontaneous delivery of placenta with centrally inserted 3-vessel cord  3. No lacerations requiring repair      Disposition: Patient tolerated the procedure well and was recovering in labor and delivery room with family and  before being transferred to the post-partum floor. Procedure Details     Description of procedure    After pushing for 6 minutes, on 23 at 2012 patient delivered a viable male , weight 7lb 10.8 oz, Apgars of 9 (1 min) and 9 (5 min). The fetal vertex delivered spontaneously. There was no nuchal cord. The anterior shoulder delivered atraumatically with maternal expulsive forces and the assistance of downward traction. The posterior shoulder delivered with maternal expulsive forces and the assistance of upward traction. The remainder of the fetus delivered spontaneously. Upon delivery, the infant was placed on the mothers abdomen and the cord was clamped and cut. The infant was noted to cry spontaneously and was moving all extremities appropriately. There was no evidence for injury. Awaiting nurse resuscitators evaluated the  at bedside. Arterial and venous cord blood gases and cord blood was collected for analysis.  These were promptly sent to the lab. In the immediate post-partum, 30 units of IV pitocin was administered and the uterus was noted to contract down well with massage and pitocin. The placenta delivered spontaneously at  and was noted to have a centrally inserted 3 vessel cord. The vagina, cervix, and perineum were inspected and there were no lacerations requiring repair. At the conclusion of the delivery, all needle, sponge, and instrument counts were noted to be correct. Patient tolerated the procedure well and was allowed to recover in labor and delivery room with family and  before being transferred to the post-partum floor. Sandro Zhang was present and participated in all key portions of the case.     German Griffin  23  9:46 PM

## 2023-12-04 NOTE — DISCHARGE SUMMARY
Discharge Summary - Ken Flores 33 y.o. female MRN: [id number]    Unit/Bed#: L&D 305-01 Encounter: [id number]    ADMISSION  Admission Date: 12/3/2023   Admitting Attending: Dr. Jacek Valdivia MD  Admitting Diagnoses:   Patient Active Problem List   Diagnosis    Obesity (BMI 30-39. 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 of cervix    Migraine without aura and without status migrainosus, not intractable    Chronic midline low back pain without sciatica    Chronic fatigue    Dry eyes    Vitamin D deficiency    Nephrocalcinosis    Anxiety    Myofascial pain    Bronchitis    39 weeks gestation of pregnancy    Depression affecting pregnancy in third trimester, antepartum    Obesity affecting pregnancy in third trimester    Diet controlled gestational diabetes mellitus (GDM) in third trimester    Positive GBS test    Encounter for induction of labor    Morbid obesity with BMI of 40.0-44.9, adult ([address])     (spontaneous vaginal delivery)    Preeclampsia with severe features       DELIVERY  Delivery Method: Vaginal, Spontaneous    Delivery Date and Time: 12/3/2023  8:12 PM   Delivery Attendin[address] [place] [address]  Discharge Date: 2023  Discharge Attending: Dr. Padmini Rios  Discharge Diagnosis:   Same, Delivered  Clinical course: Admission to [address] [place] [address] Ambler is a 33 y.o.  who was admitted at 39w6d for induction of labor for A1GDM. Of note, patient had notable history for antibodies (anti-M, K and S) previously noted by blood bank. She was ordered 2 units of packed red blood cells to be crossmatched given lower compatibility here. Her initial SVE was 4/60/-2. She was started on pitocin titration. She received an epidural for analgesia. Intrapartum, she was diagnosed with preeclampsia with severe features. She had to sustain severe range blood pressures requiring treatment with 20 mg of IV labetalol. She was started on magnesium at 1902.   She then progressed to complete cervical dilation and began pushing. Delivery  Route of Delivery: Vaginal, Spontaneous   Anesthesia: Epidural ,   QBL: 69 mL    Delivery: Vaginal, Spontaneous  at 12/3/2023  8:12 PM   Laceration: Perineal: None  Repaired? Baby's Weight: 3480 g (7 lb 10.8 oz) ; 122.75     Apgar scores: 9  and 9  at 1 and 5 minutes, respectively      Clinical Course: Post-Delivery:  The post delivery course was notable for preeclampsia with severe features. She received 24 hours of magnesium. She remained asymptomatic. On the day of discharge, the patient was ambulating, voiding spontaneously, tolerating oral intake, and hemodynamically stable. She was able to reasonably perform all ADLs. She had appropriate bowel function. Pain was well-controlled. She was discharged home on postpartum/postop day #3 without complications. Patient was instructed to follow up with her OB as an outpatient and was given appropriate warnings to call her provider with problems or concerns. Pertinent lab findings included:   Blood type B positive. Last three Hgb values:  Lab Results   Component Value Date    HGB 10.6 (L) 2023    HGB 10.4 (L) 2023    HGB 10.0 (L) 2023        Problem-specific follow-up plans included the following:  Problem List       Obesity (BMI 30-39. 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 of cervix    Current Assessment & Plan     Colpo on 23: Lesions appeared low grade, no biopsies taken         Migraine without aura and without status migrainosus, not intractable    Chronic midline low back pain without sciatica    Chronic fatigue    Dry eyes    Vitamin D deficiency    Nephrocalcinosis    Anxiety    Myofascial pain    Bronchitis    39 weeks gestation of pregnancy    Overview     Long interval pregnancy  Contraception: declines  Delivery consent signed  A1GDM  Flu shot [x]  Tdap [x]  IOL 12/3/23 at 8am  GBS positive         Depression affecting pregnancy in third trimester, antepartum    Current Assessment & [facility] ordered         Obesity affecting pregnancy in third trimester    Current Assessment & Plan     BMI 42         Diet controlled gestational diabetes mellitus (GDM) in third trimester    Current Assessment & Plan     Lab Results   Component Value Date    HGBA1C 4.9 2022     Recent Labs     23  1016 23  1222 23  1414 23  1625   POCGLU 86 68 64* 66     Blood Sugar Average: Last 72 hrs:  (P) 81.6    As per patient, blood sugars were well controlled at home, but was not compliant with reporting them  First ; recheck in 1 hour         Positive GBS test    Overview     Needs PCN in labor         Encounter for induction of labor    Morbid obesity with BMI of 40.0-44.9, adult (HCC)    * (Principal)  (spontaneous vaginal delivery)    Preeclampsia with severe features    Current Assessment & Plan     Systolic (77IUM), UAI:677 , Min:124 , TP   Diastolic (10APH), WNP:03, Min:70, Max:103  Severe features: sustained SRBP in labor, s/p 20 mg IV Labetalol  Initial labs: Plt, CMP wnl, PC 0.48  Asymptomatic  Continue q6 labs while on magnesium, Magnesium gtt to be discontinued at  on                Discharge med list:     Medication List      CONTINUE taking these medications     OneTouch Delica Lancets 78J Misc; Use 4 a day. GDM   OneTouch Verio Reflect w/Device Kit; Dispense 1 kit per insurance   formulary. GDM     ASK your doctor about these medications     acetaminophen 500 mg tablet; Commonly known as: TYLENOL   albuterol 90 mcg/act inhaler; Commonly known as: Ventolin HFA; Inhale 2   puffs every 4 (four) hours as needed for wheezing   aspirin 81 mg EC tablet; Commonly known as: ECOTRIN LOW STRENGTH; Take 2   tablets (162 mg total) by mouth daily   cyclobenzaprine 10 mg tablet; Commonly known as: FLEXERIL; Take 1 tablet   (10 mg total) by mouth 3 (three) times a day as needed for muscle spasms   escitalopram 10 mg tablet; Commonly known as: Lexapro;  Take 1 tablet (10   mg total) by mouth daily   ferrous sulfate 324 (65 Fe) mg; Take one daily, on empty stomach   loratadine 10 mg tablet; Commonly known as: CLARITIN; Take 1 tablet (10   mg total) by mouth daily   melatonin 3 mg; Take 1 tablet (3 mg total) by mouth daily at bedtime   ondansetron 4 mg disintegrating tablet; Commonly known as: ZOFRAN-ODT; Take 1 tablet (4 mg total) by mouth every 6 (six) hours as needed for   nausea   OneTouch Verio test strip; Generic drug: glucose blood; Test 4 times a   day. GDM   Trinatal Rx 1 60-1 MG Tabs       Condition at discharge:   good     Disposition:   See After Visit Summary for discharge disposition information.     Planned Readmission:   No    Xavi Steinberg MD  PGY-1 OBGYN

## 2023-12-04 NOTE — PLAN OF CARE
Problem: POSTPARTUM  Goal: Experiences normal postpartum course  Description: INTERVENTIONS:  - Monitor maternal vital signs  - Assess uterine involution and lochia  2023 by Saba Zazueta RN  Outcome: Progressing  2023 by Saba Zazueta RN  Outcome: Progressing  Goal: Appropriate maternal -  bonding  Description: INTERVENTIONS:  - Identify family support  - Assess for appropriate maternal/infant bonding   -Encourage maternal/infant bonding opportunities  - Referral to  or  as needed  2023 by Saba Zazueta RN  Outcome: Progressing  2023 by Saba Zazueta RN  Outcome: Progressing  Goal: Establishment of infant feeding pattern  Description: INTERVENTIONS:  - Assess breast/bottle feeding  - Refer to lactation as needed  2023 by Saba Zazueta RN  Outcome: Progressing  2023 by Saba Zazueta RN  Outcome: Progressing     Problem: POSTPARTUM  Goal: Incision(s), wounds(s) or drain site(s) healing without S/S of infection  Description: INTERVENTIONS  - Assess and document dressing, incision, wound bed, drain sites and surrounding tissue  - Provide patient and family education  - Perform skin care/dressing changes every   Outcome: Completed

## 2023-12-04 NOTE — LACTATION NOTE
This note was copied from a baby's chart. CONSULT - LACTATION  Baby Boy (Mone) Ignacia Zimmerman 1 days male MRN: [id number]    [address] [place] [address] NURSERY Room / Bed: L&D 305(N)/L&D 305(N) Encounter: [id number]    Maternal Information     MOTHER:  Mone Wilhelm  Maternal Age: 33 y.o.   OB History: # 1 - Date: None, Sex: None, Weight: None, GA: None, Delivery: None, Apgar1: None, Apgar5: None, Living: None, Birth Comments: None    # 2 - Date: 12, Sex: Female, Weight: 2920 g (6 lb 7 oz), GA: 37w0d, Delivery: Vaginal, Spontaneous, Apgar1: None, Apgar5: None, Living: Living, Birth Comments: None    # 3 - Date: 23, Sex: Male, Weight: 3480 g (7 lb 10.8 oz), GA: 39w6d, Delivery: Vaginal, Spontaneous, Apgar1: 9, Apgar5: 9, Living: Living, Birth Comments: None   Previouse breast reduction surgery? No    Lactation history:   Has patient previously breast fed: No   How long had patient previously breast fed:     Previous breast feeding complications:       Past Surgical History:   Procedure Laterality Date    INDUCED           Birth information:  YOB: 2022   Time of birth: 7:13 PM   Sex: male   Delivery type: Vaginal, Spontaneous   Birth Weight: 3480 g (7 lb 10.8 oz)   Percent of Weight Change: 0%     Gestational Age: 36w10d     Breastfeeding Progress Not yet established   Reasons for not Breastfeeding Maternal preference   Breast Pump   Pump 3  (Faith Barrett says she will contact her insurance company to have them release her breast pump)   Patient Follow-Up   Lactation Consult Status 2   Follow-Up Type Inpatient;Call as needed   Other OB Lactation Documentation    Additional Problem Noted Faith Barrett was giving formula per her nutrition plan. Education provided for duration of breastfeeding and supplement use. Encouraged her to call for latch assessment as desired. (RSB reviewed.  D/C booklet at bedside.)       Feeding recommendations:  breast feed on demand    Discussed risks for early supplementation: over feeding, longer digestion times, engorgement for mom, lower milk supply for mom, and nipple confusion. Benefits of breast feeding for infant's intestinal tract, less engorgement for mom, protection from multiple disease processes as infant develops, avoidance of over feeding for infant, less nipple confusion, and increased health benefits for mom. Met with mother. Provided mother with Ready, Set, Baby booklet which contained information on:  Hand expression with access to QR codes to review hand expression. Positioning and latch reviewed as well as showing images of other feeding positions. Discussed the properties of a good latch in any position. Feeding on cue and what that means for recognizing infant's hunger, s/s that baby is getting enough milk and some s/s that breastfeeding dyad may need further help  Skin to Skin contact and benefits to mom and baby  Avoidance of pacifiers for the first month discussed. Gave information on common concerns, what to expect the first few weeks after delivery, preparing for other caregivers, and how partners can help. Resources for support also provided. Bring baby to the breast so that his  lower lip and chin touch the breast with her nose at the nipple so that  his lower lip is in contact with the circumference of the areola rather than the base of the nipple. Encouraged parents to call for assistance, questions, and concerns about breastfeeding. Extension provided.       Jennifer Howell RN 12/4/2023 5:54 PM

## 2023-12-04 NOTE — PLAN OF CARE
Problem: Knowledge Deficit  Goal: Verbalizes understanding of labor plan  Description: Assess patient/family/caregiver's baseline knowledge level and ability to understand information. Provide education via patient/family/caregiver's preferred learning method at appropriate level of understanding. 1. Provide teaching at level of understanding. 2. Provide teaching via preferred learning method(s). Outcome: Completed     Problem: Labor & Delivery  Goal: Manages discomfort  Description: Assess and monitor for signs and symptoms of discomfort. Assess patient's pain level regularly and per hospital policy. Administer medications as ordered. Support use of nonpharmacological methods to help control pain such as distraction, imagery, relaxation, and application of heat and cold. Collaborate with interdisciplinary team and patient to determine appropriate pain management plan. 1. Include patient in decisions related to comfort. 2. Offer non-pharmacological pain management interventions. 3. Report ineffective pain management to physician. Outcome: Completed  Goal: Patient vital signs are stable  Description: 1. Assess vital signs - vaginal delivery.   Outcome: Completed     Problem: POSTPARTUM  Goal: Experiences normal postpartum course  Description: INTERVENTIONS:  - Monitor maternal vital signs  - Assess uterine involution and lochia  Outcome: Progressing  Goal: Appropriate maternal -  bonding  Description: INTERVENTIONS:  - Identify family support  - Assess for appropriate maternal/infant bonding   -Encourage maternal/infant bonding opportunities  - Referral to  or  as needed  Outcome: Progressing  Goal: Establishment of infant feeding pattern  Description: INTERVENTIONS:  - Assess breast/bottle feeding  - Refer to lactation as needed  Outcome: Progressing  Goal: Incision(s), wounds(s) or drain site(s) healing without S/S of infection  Description: INTERVENTIONS  - Assess and document dressing, incision, wound bed, drain sites and surrounding tissue  - Provide patient and family education  Outcome: Progressing

## 2023-12-05 ENCOUNTER — TRANSITIONAL CARE MANAGEMENT (OUTPATIENT)
Dept: FAMILY MEDICINE CLINIC | Facility: CLINIC | Age: 28
End: 2023-12-05

## 2023-12-05 VITALS
DIASTOLIC BLOOD PRESSURE: 76 MMHG | OXYGEN SATURATION: 99 % | TEMPERATURE: 99.3 F | HEART RATE: 66 BPM | RESPIRATION RATE: 16 BRPM | SYSTOLIC BLOOD PRESSURE: 140 MMHG

## 2023-12-05 PROCEDURE — NC001 PR NO CHARGE: Performed by: OBSTETRICS & GYNECOLOGY

## 2023-12-05 PROCEDURE — 99024 POSTOP FOLLOW-UP VISIT: CPT | Performed by: OBSTETRICS & GYNECOLOGY

## 2023-12-05 RX ORDER — IBUPROFEN 600 MG/1
600 TABLET ORAL EVERY 6 HOURS
Qty: 30 TABLET | Refills: 0 | Status: SHIPPED | OUTPATIENT
Start: 2023-12-05

## 2023-12-05 RX ADMIN — SENNOSIDES 8.6 MG: 8.6 TABLET, FILM COATED ORAL at 08:37

## 2023-12-05 RX ADMIN — ESCITALOPRAM OXALATE 10 MG: 10 TABLET ORAL at 08:37

## 2023-12-05 RX ADMIN — IBUPROFEN 600 MG: 600 TABLET, FILM COATED ORAL at 10:47

## 2023-12-05 RX ADMIN — DOCUSATE SODIUM 100 MG: 100 CAPSULE, LIQUID FILLED ORAL at 08:37

## 2023-12-05 RX ADMIN — IBUPROFEN 600 MG: 600 TABLET, FILM COATED ORAL at 06:35

## 2023-12-05 NOTE — LACTATION NOTE
This note was copied from a baby's chart. CONSULT - LACTATION  Baby Boy (Mone) Jewell Nelson 2 days male MRN: [id number]    [address] [place] [address] NURSERY Room / Bed: L&D 305(N)/L&D 305(N) Encounter: [id number]    Maternal Information     MOTHER:  Mone Wilhelm  Maternal Age: 33 y.o.   OB History: # 1 - Date: None, Sex: None, Weight: None, GA: None, Delivery: None, Apgar1: None, Apgar5: None, Living: None, Birth Comments: None    # 2 - Date: 12, Sex: Female, Weight: 2920 g (6 lb 7 oz), GA: 37w0d, Delivery: Vaginal, Spontaneous, Apgar1: None, Apgar5: None, Living: Living, Birth Comments: None    # 3 - Date: 23, Sex: Male, Weight: 3480 g (7 lb 10.8 oz), GA: 39w6d, Delivery: Vaginal, Spontaneous, Apgar1: 9, Apgar5: 9, Living: Living, Birth Comments: None   Previouse breast reduction surgery? No    Lactation history:   Has patient previously breast fed: No   How long had patient previously breast fed:     Previous breast feeding complications:       Past Surgical History:   Procedure Laterality Date    INDUCED           Birth information:  YOB: 2022   Time of birth: 7:13 PM   Sex: male   Delivery type: Vaginal, Spontaneous   Birth Weight: 3480 g (7 lb 10.8 oz)   Percent of Weight Change: -2%     Gestational Age: 36w10d   [unfilled]    Assessment     Breast and nipple assessment: normal assessment     Assessment:  see table below    Feeding assessment: feeding well  LATCH:  Latch: Grasps breast, tongue down, lips flanged, rhythmic sucking   Audible Swallowing: Spontaneous and intermittent (24 hours old)   Type of Nipple: Everted (After stimulation)   Comfort (Breast/Nipple): Soft/non-tender   Hold (Positioning): Full assist, staff holds infant at breast   LATCH Score: 8        Having latch problems? (Nasal congestion present making it difficult for Noriega to maintain latch at the breast)   Position(s) Used Side Lying;Cross Cradle;Laid Back; Football Breasts/Nipples   Left Breast Soft   Right Breast Soft   Left Nipple Everted  (Short nipple shaft with small diameter noted)   Right Nipple Everted; Other (Comment)  (Short nipple shaft with small diameter noted)   Intervention Hand expression   Breastfeeding Progress Not yet established   Reasons for not Breastfeeding Maternal preference  (has been formula feeding)   Breast Pump   Pump 3  (CM consult for Belinda Silveira)   Patient Follow-Up   Lactation Consult Status 2   Follow-Up Type Inpatient;Call as needed   Other OB Lactation Documentation    Additional Problem Noted Discussed D/C booklet. Crystal Horowitz can latch. Nasal congestion making it difficult to sustain latch. HE highly effective. Encouraged follow up with outpatient lactation support to build confidence and provide reassurance with breastfeeding as Rosaline Glass did not breastfeed her older child. Feeding recommendations:  breast feed on demand    Met with mother to go over discharge breastfeeding booklet including the feeding log. Emphasized 8 or more (12) feedings in a 24 hour period, what to expect for the number of diapers per day of life and the progression of properties of the  stooling pattern. List of reasons to call a lactation consultant. Feeding logs  Feeding cues  Hand expression  Baby's Second day (cluster feeding)  Breastfeeding and Your Lifestyle (Medications, Alcohol, Caffeine, Smoking, Street Drugs, Methadone)  First Two Weeks Survival Guide for Breastfeeding  Breast Changes  Physical Therapy  Storage and Handling of Breast milk  How to Keep Your Breast Pump Kit Clean  The Employed Breastfeeding Mother  Mixed feeding  Bottle feeding like breastfeeding (paced bottle feeding)  astfeeding and your lifestyle, storage and preparation of breast milk, how to keep you breast pump clean, the employed breastfeeding mother and paced bottle feeding handouts.      Booklet included Breastfeeding Resources for after discharge including access to the number for the [address]. Information on hand expression given. Discussed benefits of knowing how to manually express breast including stimulating milk supply, softening nipple for latch and evacuating breast in the event of engorgement. Reviewed how to bring baby to the breast so that his lower lip and chin touch the breast with his nose just above the nipple to encourage a wider, more asymmetric latch. Encouraged parents to call for assistance, questions, and concerns about breastfeeding. Extension provided.       Vida Kwan RN 12/5/2023 1:09 PM

## 2023-12-05 NOTE — PLAN OF CARE
Problem: POSTPARTUM  Goal: Experiences normal postpartum course  Description: INTERVENTIONS:  - Monitor maternal vital signs  - Assess uterine involution and lochia  Outcome: Adequate for Discharge  Goal: Appropriate maternal -  bonding  Description: INTERVENTIONS:  - Identify family support  - Assess for appropriate maternal/infant bonding   -Encourage maternal/infant bonding opportunities  - Referral to  or  as needed  Outcome: Adequate for Discharge  Goal: Establishment of infant feeding pattern  Description: INTERVENTIONS:  - Assess breast/bottle feeding  - Refer to lactation as needed  Outcome: Adequate for Discharge     Problem: PAIN - ADULT  Goal: Verbalizes/displays adequate comfort level or baseline comfort level  Description: Interventions:  - Encourage patient to monitor pain and request assistance  - Assess pain using appropriate pain scale  - Administer analgesics based on type and severity of pain and evaluate response  - Implement non-pharmacological measures as appropriate and evaluate response  - Consider cultural and social influences on pain and pain management  - Notify physician/advanced practitioner if interventions unsuccessful or patient reports new pain  Outcome: Adequate for Discharge     Problem: INFECTION - ADULT  Goal: Absence or prevention of progression during hospitalization  Description: INTERVENTIONS:  - Assess and monitor for signs and symptoms of infection  - Monitor lab/diagnostic results  - Monitor all insertion sites, i.e. indwelling lines, tubes, and drains  - Monitor endotracheal if appropriate and nasal secretions for changes in amount and color  - [place] appropriate cooling/warming therapies per order  - Administer medications as ordered  - Instruct and encourage patient and family to use good hand hygiene technique  - Identify and instruct in appropriate isolation precautions for identified infection/condition  Outcome: Adequate for Discharge  Goal: Absence of fever/infection during neutropenic period  Description: INTERVENTIONS:  - Monitor WBC    Outcome: Adequate for Discharge     Problem: SAFETY ADULT  Goal: Patient will remain free of falls  Description: INTERVENTIONS:  - Educate patient/family on patient safety including physical limitations  - Instruct patient to call for assistance with activity   - Consult OT/PT to assist with strengthening/mobility   - Keep Call bell within reach  - Keep bed low and locked with side rails adjusted as appropriate  - Keep care items and personal belongings within reach  - Initiate and maintain comfort rounds  - Make Fall Risk Sign visible to staff  - Offer Toileting every  Hours, in advance of need  - Initiate/Maintain alarm  - Obtain necessary fall risk management equipment:   - Apply yellow socks and bracelet for high fall risk patients  - Consider moving patient to room near nurses station  Outcome: Adequate for Discharge  Goal: Maintain or return to baseline ADL function  Description: INTERVENTIONS:  -  Assess patient's ability to carry out ADLs; assess patient's baseline for ADL function and identify physical deficits which impact ability to perform ADLs (bathing, care of mouth/teeth, toileting, grooming, dressing, etc.)  - Assess/evaluate cause of self-care deficits   - Assess range of motion  - Assess patient's mobility; develop plan if impaired  - Assess patient's need for assistive devices and provide as appropriate  - Encourage maximum independence but intervene and supervise when necessary  - Involve family in performance of ADLs  - Assess for home care needs following discharge   - Consider OT consult to assist with ADL evaluation and planning for discharge  - Provide patient education as appropriate  Outcome: Adequate for Discharge  Goal: Maintains/Returns to pre admission functional level  Description: INTERVENTIONS:  - Perform AM-PAC 6 Click Basic Mobility/ Daily Activity assessment daily.  - Set and communicate daily mobility goal to care team and patient/family/caregiver. - Collaborate with rehabilitation services on mobility goals if consulted  - Perform Range of Motion imes a day. - Reposition patient every  hours. - Dangle patient times a day  - Stand patient times a day  - Ambulate patient imes a day  - Out of bed to chair  times a day   - Out of bed for meals  times a day  - Out of bed for toileting  - Record patient progress and toleration of activity level   Outcome: Adequate for Discharge     Problem: Knowledge Deficit  Goal: Patient/family/caregiver demonstrates understanding of disease process, treatment plan, medications, and discharge instructions  Description: Complete learning assessment and assess knowledge base.   Interventions:  - Provide teaching at level of understanding  - Provide teaching via preferred learning methods  Outcome: Adequate for Discharge     Problem: DISCHARGE PLANNING  Goal: Discharge to home or other facility with appropriate resources  Description: INTERVENTIONS:  - Identify barriers to discharge w/patient and caregiver  - Arrange for needed discharge resources and transportation as appropriate  - Identify discharge learning needs (meds, wound care, etc.)  - Arrange for interpretive services to assist at discharge as needed  - Refer to Case Management Department for coordinating discharge planning if the patient needs post-hospital services based on physician/advanced practitioner order or complex needs related to functional status, cognitive ability, or social support system  Outcome: Adequate for Discharge

## 2023-12-05 NOTE — PLAN OF CARE
Problem: POSTPARTUM  Goal: Experiences normal postpartum course  Description: INTERVENTIONS:  - Monitor maternal vital signs  - Assess uterine involution and lochia  2023 by Ryan Jones RN  Outcome: Completed  2023 by Ryan Jones RN  Outcome: Adequate for Discharge  Goal: Appropriate maternal -  bonding  Description: INTERVENTIONS:  - Identify family support  - Assess for appropriate maternal/infant bonding   -Encourage maternal/infant bonding opportunities  - Referral to  or  as needed  2023 by Ryan Jones RN  Outcome: Completed  2023 by Ryan Jones RN  Outcome: Adequate for Discharge  Goal: Establishment of infant feeding pattern  Description: INTERVENTIONS:  - Assess breast/bottle feeding  - Refer to lactation as needed  2023 by Ryan Jones RN  Outcome: Completed  2023 by Ryan Jones RN  Outcome: Adequate for Discharge     Problem: PAIN - ADULT  Goal: Verbalizes/displays adequate comfort level or baseline comfort level  Description: Interventions:  - Encourage patient to monitor pain and request assistance  - Assess pain using appropriate pain scale  - Administer analgesics based on type and severity of pain and evaluate response  - Implement non-pharmacological measures as appropriate and evaluate response  - Consider cultural and social influences on pain and pain management  - Notify physician/advanced practitioner if interventions unsuccessful or patient reports new pain  2023 by Ryan Jones RN  Outcome: Completed  2023 by Ryan Jones RN  Outcome: Adequate for Discharge     Problem: INFECTION - ADULT  Goal: Absence or prevention of progression during hospitalization  Description: INTERVENTIONS:  - Assess and monitor for signs and symptoms of infection  - Monitor lab/diagnostic results  - Monitor all insertion sites, i.e. indwelling lines, tubes, and drains  - Monitor endotracheal if appropriate and nasal secretions for changes in amount and color  - [place] appropriate cooling/warming therapies per order  - Administer medications as ordered  - Instruct and encourage patient and family to use good hand hygiene technique  - Identify and instruct in appropriate isolation precautions for identified infection/condition  12/5/2023 1216 by Claudette Terrazas RN  Outcome: Completed  12/5/2023 0847 by Claudette Terrazas RN  Outcome: Adequate for Discharge  Goal: Absence of fever/infection during neutropenic period  Description: INTERVENTIONS:  - Monitor WBC    12/5/2023 1216 by Claudette Terrazas RN  Outcome: Completed  12/5/2023 0847 by Claudette Terrazas RN  Outcome: Adequate for Discharge     Problem: SAFETY ADULT  Goal: Patient will remain free of falls  Description: INTERVENTIONS:  - Educate patient/family on patient safety including physical limitations  - Instruct patient to call for assistance with activity   - Consult OT/PT to assist with strengthening/mobility   - Keep Call bell within reach  - Keep bed low and locked with side rails adjusted as appropriate  - Keep care items and personal belongings within reach  - Initiate and maintain comfort rounds  - Make Fall Risk Sign visible to staff  - Offer Toileting every  Hours, in advance of need  - Initiate/Maintain larm  - Obtain necessary fall risk management equipment: - Apply yellow socks and bracelet for high fall risk patients  - Consider moving patient to room near nurses station  12/5/2023 1216 by Claudette Terrazas RN  Outcome: Completed  12/5/2023 0847 by Claudette Terrazas RN  Outcome: Adequate for Discharge  Goal: Maintain or return to baseline ADL function  Description: INTERVENTIONS:  -  Assess patient's ability to carry out ADLs; assess patient's baseline for ADL function and identify physical deficits which impact ability to perform ADLs (bathing, care of mouth/teeth, toileting, grooming, dressing, etc.)  - Assess/evaluate cause of self-care deficits   - Assess range of motion  - Assess patient's mobility; develop plan if impaired  - Assess patient's need for assistive devices and provide as appropriate  - Encourage maximum independence but intervene and supervise when necessary  - Involve family in performance of ADLs  - Assess for home care needs following discharge   - Consider OT consult to assist with ADL evaluation and planning for discharge  - Provide patient education as appropriate  12/5/2023 1216 by Terence Vides RN  Outcome: Completed  12/5/2023 0847 by Terence Vides RN  Outcome: Adequate for Discharge  Goal: Maintains/Returns to pre admission functional level  Description: INTERVENTIONS:  - Perform AM-PAC 6 Click Basic Mobility/ Daily Activity assessment daily.  - Set and communicate daily mobility goal to care team and patient/family/caregiver. - Collaborate with rehabilitation services on mobility goals if consulted  - Perform Range of Motion  times a day. - Reposition patient every  hours. - Dangle patient  times a day  - Stand patient times a day  - Ambulate patient  times a day  - Out of bed to chair  times a day   - Out of bed for meals  times a day  - Out of bed for toileting  - Record patient progress and toleration of activity level   12/5/2023 1216 by Terence Vides RN  Outcome: Completed  12/5/2023 0847 by Terence Vides RN  Outcome: Adequate for Discharge     Problem: Knowledge Deficit  Goal: Patient/family/caregiver demonstrates understanding of disease process, treatment plan, medications, and discharge instructions  Description: Complete learning assessment and assess knowledge base.   Interventions:  - Provide teaching at level of understanding  - Provide teaching via preferred learning methods  12/5/2023 1216 by Terence Vides RN  Outcome: Completed  12/5/2023 0847 by Terence Vides RN  Outcome: Adequate for Discharge     Problem: DISCHARGE PLANNING  Goal: Discharge to home or other facility with appropriate resources  Description: INTERVENTIONS:  - Identify barriers to discharge w/patient and caregiver  - Arrange for needed discharge resources and transportation as appropriate  - Identify discharge learning needs (meds, wound care, etc.)  - Arrange for interpretive services to assist at discharge as needed  - Refer to Case Management Department for coordinating discharge planning if the patient needs post-hospital services based on physician/advanced practitioner order or complex needs related to functional status, cognitive ability, or social support system  12/5/2023 1216 by Lucila Diaz RN  Outcome: Completed  12/5/2023 0847 by Lucila Diaz, RN  Outcome: Adequate for Discharge

## 2023-12-05 NOTE — CASE MANAGEMENT
Case Management Progress Note    Patient name Mamie Ybarra  Location L&D 305/L&D 637-81 MRN [id number]  : 1995 Date 2023       LOS (days): 2  Geometric Mean LOS (GMLOS) (days):   Days to GMLOS:        OBJECTIVE:        Current admission status: Inpatient  Preferred Pharmacy:   [facility]  [facility] [address]  Phone: [phone] Fax: [phone]    [facility]/pharmacy #[id number] [place] [address]  Phone: [phone] Fax: [phone]    Primary Care Provider: HALLIE Smith    Primary Insurance: [facility]  Secondary Insurance:     PROGRESS NOTE:  CM received consult for breast pump. MOB requesting Zomee Z2 pump. CM placed order via [employer]. Order approved. CM delivered pump to MOB's room. Delivery ticket signed and placed in bin for DME liaison.

## 2023-12-05 NOTE — PROGRESS NOTES
Obstetrics Progress Note  [facility] 33 y.o. female MRN: [id number]  Unit/Bed#: L&D 305-01 Encounter: [id number]    Assessment/Plan:    Postpartum Day #3 s/p  with intrapartum course complicated by preeclampsia with severe features, now s/p Magnesium, currently stable. Baby in room. By issue:    *  (spontaneous vaginal delivery)  Assessment & Plan       Continue routine post partum care  Pain well controlled: tylenol/motrin scheduled  Lochia within normal limits: continue to monitor   OOB: as able, encourage ambulation  Passing flatus  Voiding spontaneously  Encourage breastfeeding  Baby in: room  Dispo: anticipate d/c home pending blood pressure management      Preeclampsia with severe features  Assessment & Plan  Systolic (45SSL), GPZ:412 , Min:114 , TXI:773   Diastolic (34WDE), JDX:36, Min:75, Max:94  Severe features: sustained SRBP in labor, s/p 20 mg IV Labetalol  Initial labs: Plt, CMP wnl, PC 0.48  Cr 0.52 -> 0.65 -> 0.91 ->> 0.64  S/p Magnesium  Asymptomatic  Continue to monitor BP  Not currently on medication        Diet controlled gestational diabetes mellitus (GDM) in third trimester  Assessment & Plan  Lab Results   Component Value Date    HGBA1C 4.9 2022       Recent Labs     23  1016 23  1222 23  1414 23  1625   POCGLU 86 68 64* 66       Blood Sugar Average: Last 72 hrs:  (P) 81.6    F/u outpatient    Obesity affecting pregnancy in third trimester  Assessment & Plan  BMI 42    Depression affecting pregnancy in third trimester, antepartum  Assessment & Plan  Home lexapro ordered    LGSIL on Pap smear of cervix  Assessment & Plan  Colpo on 23: Lesions appeared low grade, no biopsies taken        Subjective/Objective     Subjective:   Patient now s/p Magnesium, doing well. No acute events overnight. Pain: controlled. Tolerating PO: yes. Voiding: spontaneously. Flatus: passing. Ambulating: yes.  Chest pain: no. Shortness of breath: no. Leg pain: no. Lochia: within normal limits. Baby in room, feeding via breast.    Objective:   Vitals:   Temp:  [97.7 °F (36.5 °C)-98.6 °F (37 °C)] 98.6 °F (37 °C)  HR:  [85-95] 88  Resp:  [16-20] 20  BP: (114-138)/(75-94) 132/83       Intake/Output Summary (Last 24 hours) at 12/5/2023 0611  Last data filed at 12/4/2023 1655  Gross per 24 hour   Intake 1000 ml   Output 2000 ml   Net -1000 ml       Lab Results   Component Value Date    WBC 13.15 (H) 12/04/2023    HGB 9.4 (L) 12/04/2023    HCT 28.3 (L) 12/04/2023    MCV 84 12/04/2023     12/04/2023       Physical Exam:   General: alert and oriented x3, in no apparent distress  Cardiovascular: regular rate  Pulmonary: normal effort  Abdomen: Soft, non-tender, non-distended, no rebound or guarding.  Uterine fundus firm and non-tender, at the umbilicus  Extremities: Non tender with no edema      Theda Spatz, MD  PGY-I, OBGYN  12/5/2023, 6:11 AM

## 2023-12-05 NOTE — PLAN OF CARE
Problem: POSTPARTUM  Goal: Experiences normal postpartum course  Description: INTERVENTIONS:  - Monitor maternal vital signs  - Assess uterine involution and lochia  Outcome: Progressing  Goal: Appropriate maternal -  bonding  Description: INTERVENTIONS:  - Identify family support  - Assess for appropriate maternal/infant bonding   -Encourage maternal/infant bonding opportunities  - Referral to  or  as needed  Outcome: Progressing  Goal: Establishment of infant feeding pattern  Description: INTERVENTIONS:  - Assess breast/bottle feeding  - Refer to lactation as needed  Outcome: Progressing     Problem: PAIN - ADULT  Goal: Verbalizes/displays adequate comfort level or baseline comfort level  Description: Interventions:  - Encourage patient to monitor pain and request assistance  - Assess pain using appropriate pain scale  - Administer analgesics based on type and severity of pain and evaluate response  - Implement non-pharmacological measures as appropriate and evaluate response  - Consider cultural and social influences on pain and pain management  - Notify physician/advanced practitioner if interventions unsuccessful or patient reports new pain  Outcome: Progressing     Problem: INFECTION - ADULT  Goal: Absence or prevention of progression during hospitalization  Description: INTERVENTIONS:  - Assess and monitor for signs and symptoms of infection  - Monitor lab/diagnostic results  - Monitor all insertion sites, i.e. indwelling lines, tubes, and drains  - Monitor endotracheal if appropriate and nasal secretions for changes in amount and color  - [place] appropriate cooling/warming therapies per order  - Administer medications as ordered  - Instruct and encourage patient and family to use good hand hygiene technique  - Identify and instruct in appropriate isolation precautions for identified infection/condition  Outcome: Progressing  Goal: Absence of fever/infection during neutropenic period  Description: INTERVENTIONS:  - Monitor WBC    Outcome: Progressing     Problem: SAFETY ADULT  Goal: Patient will remain free of falls  Description: INTERVENTIONS:  - Educate patient/family on patient safety including physical limitations  - Instruct patient to call for assistance with activity   - Consult OT/PT to assist with strengthening/mobility   - Keep Call bell within reach  - Keep bed low and locked with side rails adjusted as appropriate  - Keep care items and personal belongings within reach  - Initiate and maintain comfort rounds  - Make Fall Risk Sign visible to staff  - Offer Toileting every  Hours, in advance of need  - Initiate/Maintain alarm  - Obtain necessary fall risk management equipment:   - Apply yellow socks and bracelet for high fall risk patients  - Consider moving patient to room near nurses station  Outcome: Progressing  Goal: Maintain or return to baseline ADL function  Description: INTERVENTIONS:  -  Assess patient's ability to carry out ADLs; assess patient's baseline for ADL function and identify physical deficits which impact ability to perform ADLs (bathing, care of mouth/teeth, toileting, grooming, dressing, etc.)  - Assess/evaluate cause of self-care deficits   - Assess range of motion  - Assess patient's mobility; develop plan if impaired  - Assess patient's need for assistive devices and provide as appropriate  - Encourage maximum independence but intervene and supervise when necessary  - Involve family in performance of ADLs  - Assess for home care needs following discharge   - Consider OT consult to assist with ADL evaluation and planning for discharge  - Provide patient education as appropriate  Outcome: Progressing  Goal: Maintains/Returns to pre admission functional level  Description: INTERVENTIONS:  - Perform AM-PAC 6 Click Basic Mobility/ Daily Activity assessment daily.  - Set and communicate daily mobility goal to care team and patient/family/caregiver. - Collaborate with rehabilitation services on mobility goals if consulted  - Perform Range of Motion  times a day. - Reposition patient every  hours. - Dangle patient  times a day  - Stand patient  times a day  - Ambulate patient  times a day  - Out of bed to chair  times a day   - Out of bed for meals times a day  - Out of bed for toileting  - Record patient progress and toleration of activity level   Outcome: Progressing     Problem: Knowledge Deficit  Goal: Patient/family/caregiver demonstrates understanding of disease process, treatment plan, medications, and discharge instructions  Description: Complete learning assessment and assess knowledge base.   Interventions:  - Provide teaching at level of understanding  - Provide teaching via preferred learning methods  Outcome: Progressing     Problem: DISCHARGE PLANNING  Goal: Discharge to home or other facility with appropriate resources  Description: INTERVENTIONS:  - Identify barriers to discharge w/patient and caregiver  - Arrange for needed discharge resources and transportation as appropriate  - Identify discharge learning needs (meds, wound care, etc.)  - Arrange for interpretive services to assist at discharge as needed  - Refer to Case Management Department for coordinating discharge planning if the patient needs post-hospital services based on physician/advanced practitioner order or complex needs related to functional status, cognitive ability, or social support system  Outcome: Progressing

## 2023-12-06 ENCOUNTER — OFFICE VISIT (OUTPATIENT)
Dept: OBGYN CLINIC | Facility: CLINIC | Age: 28
End: 2023-12-06

## 2023-12-06 VITALS
HEART RATE: 99 BPM | SYSTOLIC BLOOD PRESSURE: 142 MMHG | BODY MASS INDEX: 39.88 KG/M2 | DIASTOLIC BLOOD PRESSURE: 84 MMHG | WEIGHT: 197.8 LBS | HEIGHT: 59 IN

## 2023-12-06 DIAGNOSIS — O14.13 SEVERE PRE-ECLAMPSIA IN THIRD TRIMESTER: Primary | ICD-10-CM

## 2023-12-06 PROCEDURE — 99024 POSTOP FOLLOW-UP VISIT: CPT | Performed by: OBSTETRICS & GYNECOLOGY

## 2023-12-06 RX ORDER — ACETAMINOPHEN AND CODEINE PHOSPHATE 120; 12 MG/5ML; MG/5ML
1 SOLUTION ORAL DAILY
Qty: 28 TABLET | Refills: 12 | Status: SHIPPED | OUTPATIENT
Start: 2023-12-06

## 2023-12-06 RX ORDER — ADHESIVE BANDAGE 3/4"
BANDAGE TOPICAL 3 TIMES DAILY
Qty: 1 EACH | Refills: 0 | Status: SHIPPED | OUTPATIENT
Start: 2023-12-06

## 2023-12-06 NOTE — PROGRESS NOTES
Subjective    Patient is a 32 yo  who is s/p an  on 12/3/23 in the setting of an IOL for A1GDM. During her labor course, she was diagnosed with preeclampsia with severe features and started on magnesium sulfate. Her blood pressures required acute treatment with 20 mg of IV labetalol, but she did not require any long-acting oral antihypertensive medication. She has not been taking her blood pressure at home since she does not have a blood pressure cuff. She is also interested in starting contraception. She is attempting to breastfeed, but has not produced a sufficient amount yet. OB History          3    Para   2    Term   2            AB   1    Living   2         SAB        IAB   1    Ectopic        Multiple   0    Live Births   2           Obstetric Comments   Menarche 12  Cycles Q 28d  Vaginal birth x 1                      Objective    Vitals:    23 1352   BP: 142/84   BP Location: Left arm   Patient Position: Sitting   Cuff Size: Standard   Pulse: 99   Weight: 89.7 kg (197 lb 12.8 oz)   Height: 4' 11" (1.499 m)        Physical Exam  Constitutional:       Appearance: Normal appearance. HENT:      Head: Normocephalic and atraumatic. Cardiovascular:      Rate and Rhythm: Normal rate. Pulmonary:      Effort: Pulmonary effort is normal. No respiratory distress. Musculoskeletal:         General: Normal range of motion. Neurological:      Mental Status: She is alert. Skin:     General: Skin is warm and dry. Assessment    Patient Active Problem List   Diagnosis    Obesity (BMI 30-39. 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 of cervix    Migraine without aura and without status migrainosus, not intractable    Chronic midline low back pain without sciatica    Chronic fatigue    Dry eyes    Vitamin D deficiency    Nephrocalcinosis    Anxiety    Myofascial pain    Bronchitis    39 weeks gestation of pregnancy    Depression affecting pregnancy in third trimester, antepartum    Obesity affecting pregnancy in third trimester    Diet controlled gestational diabetes mellitus (GDM) in third trimester    Positive GBS test    Encounter for induction of labor    Morbid obesity with BMI of 40.0-44.9, adult (HCC)     (spontaneous vaginal delivery)    Preeclampsia with severe features        Plan  - Contraception: micronor  - Blood pressure cuff ordered  - Discussed taking blood pressure at home  - Instructed patient to call with BP > 160/110, and to log pressures

## 2023-12-06 NOTE — UTILIZATION REVIEW
Mother and baby discharged on 2023     NOTIFICATION OF INPATIENT ADMISSION   MATERNITY/DELIVERY AUTHORIZATION REQUEST   SERVICING FACILITY:   [address] [place], [place] [address]  L&D, [place], NICU  [address]  Tax ID: [id number]  NPI: [id number] ATTENDING PROVIDER:  Attending Name and NPI#: Edna Kovacs Md [[id number]]  Address: [address]  Phone: [phone]     ADMISSION INFORMATION:  Place of Service: Inpatient [address] [place] [address] Code: 21  Inpatient Admission Date/Time: 12/3/23  8:47 AM  Discharge Date/Time: 2023  2:38 PM  Admitting Diagnosis Code/Description:  Encounter for elective induction of labor [Z34.90]   Mother: Benito Chew 1995 Estimated Date of Delivery: 23  Delivering clinician: Edna Kovacs    OB History          3    Para   2    Term   2            AB   1    Living   2         SAB        IAB   1    Ectopic        Multiple   0    Live Births   2           Obstetric Comments   Menarche 12  Cycles Q 28d  Vaginal birth x 1              Name & MRN:   Information for the patient's :  [facility] [[id number]]    Delivery Information:  Sex: male  Delivered 12/3/2023 8:12 PM by Vaginal, Spontaneous; Gestational Age: 36w10d     Measurements:  Weight: 7 lb 10.8 oz (3480 g); Height: 20"    APGAR 1 minute 5 minutes 10 minutes   Totals: 9 9       Birth Information: 33 y.o. female MRN: [id number] Unit/Bed#: L&D 305-01   Birthweight: 3629 g (8 lb) Gestational Age: 39w0d Delivery Type: Vaginal, Spontaneous  APGARS Totals:        UTILIZATION REVIEW CONTACT:  Leonid King, Utilization   Network Utilization Review Department  Phone: [phone]  Fax [phone]  Email: [email or web address]  Contact for approvals/pending authorizations, clinical reviews, and discharge.    PHYSICIAN ADVISORY SERVICES:  Medical Necessity Denial & [facility] Review  Phone: [phone]  Fax: [phone]  Email: [email or web address]   DISCHARGE SUPPORT TEAM:  For Patients Discharge Needs & Updates  Phone: [phone] opt. 2 Fax: [phone]  Email: [email or web address]

## 2023-12-06 NOTE — PATIENT INSTRUCTIONS
Call if BP over 160/110    Ideally BP less than 140/90    Take BP 3x per day  Preeclampsia and Eclampsia After Delivery   AMBULATORY CARE:   What you need to know about preeclampsia and eclampsia after delivery:  Preeclampsia is high blood pressure (BP) that usually develops after week 20 of pregnancy. When it develops days to weeks after delivery, it may be called postpartum preeclampsia. Preeclampsia causes your BP to be 140/90 or higher. You may also have protein in your urine or organ damage. Preeclampsia can lead to life-threatening conditions such as a stroke or HELLP syndrome (blood cell destruction). It can also lead to eclampsia, a condition that causes seizures from high BP. What you need to know about preeclampsia and eclampsia after delivery:  Preeclampsia is high blood pressure (BP) that usually develops after week 20 of pregnancy. It can also develop days to weeks after delivery, even if you did not have high BP during pregnancy. When it develops after delivery, it may also be called postpartum preeclampsia. Preeclampsia causes your BP to be 140/90 or higher. You may also have protein in your urine or damage to organs such as your kidneys or liver. Preeclampsia can lead to life-threatening conditions such as a stroke or HELLP syndrome (blood cell destruction). It can also lead to eclampsia, a condition that causes seizures from high BP. Warning signs to watch for:   BP that starts to increase or reaches 130/80 or higher    Shortness of breath    Nausea and vomiting, or severe stomach pain    Severe headaches    Vision changes, or seeing spots in front of your eyes    Arm or leg swelling    Call your local emergency number (911 in the [address]) if:   You have a seizure. You have chest pain. You have severe nausea and vomiting. Call your doctor or obstetrician if:   You develop a severe headache that does not go away.     You have new or increased vision changes, such as blurred or spotted vision. You have new or increased swelling in your face or hands. You have severe abdominal pain with or without nausea and vomiting. You have shortness of breath. Your blood pressure is higher than you were told it should be. You have questions or concerns about your condition or care. Manage or prevent preeclampsia or eclampsia after delivery:   Go to follow-up appointments as directed. Your obstetrician will check your blood pressure regularly until it is normal. He or she may also order other tests. Take medicines as directed. Medicines may be given to lower your blood pressure, protect your organs, or prevent seizures. Rest during the day. Your healthcare provider may tell you to rest more often if you have mild symptoms of preeclampsia. Do not drink alcohol or smoke. Alcohol, nicotine, and other chemicals in cigarettes and cigars, can increase your BP. They can also harm your baby. Ask your healthcare provider for information if you currently drink alcohol or smoke and need help to quit. E-cigarettes or smokeless tobacco still contain nicotine. Talk to your healthcare provider before you use these products. Follow up with your doctor or obstetrician as directed:  Write down your questions so you remember to ask them during your visits. © Copyright [employer] 2023 Information is for End User's use only and may not be sold, redistributed or otherwise used for commercial purposes. The above information is an  only. It is not intended as medical advice for individual conditions or treatments. Talk to your doctor, nurse or pharmacist before following any medical regimen to see if it is safe and effective for you.

## 2023-12-07 LAB
ABO GROUP BLD BPU: NORMAL
ABO GROUP BLD BPU: NORMAL
BPU ID: NORMAL
BPU ID: NORMAL
CROSSMATCH: NORMAL
CROSSMATCH: NORMAL
UNIT DISPENSE STATUS: NORMAL
UNIT DISPENSE STATUS: NORMAL
UNIT PRODUCT CODE: NORMAL
UNIT PRODUCT CODE: NORMAL
UNIT PRODUCT VOLUME: 350 ML
UNIT PRODUCT VOLUME: 350 ML
UNIT RH: NORMAL
UNIT RH: NORMAL

## 2023-12-07 PROCEDURE — 88307 TISSUE EXAM BY PATHOLOGIST: CPT | Performed by: PATHOLOGY

## 2023-12-08 ENCOUNTER — OFFICE VISIT (OUTPATIENT)
Dept: FAMILY MEDICINE CLINIC | Facility: CLINIC | Age: 28
End: 2023-12-08
Payer: COMMERCIAL

## 2023-12-08 VITALS
WEIGHT: 198 LBS | BODY MASS INDEX: 39.92 KG/M2 | HEART RATE: 82 BPM | HEIGHT: 59 IN | DIASTOLIC BLOOD PRESSURE: 94 MMHG | OXYGEN SATURATION: 97 % | SYSTOLIC BLOOD PRESSURE: 162 MMHG

## 2023-12-08 DIAGNOSIS — O14.13 SEVERE PRE-ECLAMPSIA IN THIRD TRIMESTER: ICD-10-CM

## 2023-12-08 DIAGNOSIS — G89.29 CHRONIC MIDLINE LOW BACK PAIN WITHOUT SCIATICA: ICD-10-CM

## 2023-12-08 DIAGNOSIS — O24.410 DIET CONTROLLED GESTATIONAL DIABETES MELLITUS (GDM) IN THIRD TRIMESTER: Primary | ICD-10-CM

## 2023-12-08 DIAGNOSIS — F41.9 ANXIETY: ICD-10-CM

## 2023-12-08 DIAGNOSIS — Z00.00 ANNUAL PHYSICAL EXAM: ICD-10-CM

## 2023-12-08 DIAGNOSIS — R09.82 PND (POST-NASAL DRIP): ICD-10-CM

## 2023-12-08 DIAGNOSIS — M54.50 CHRONIC MIDLINE LOW BACK PAIN WITHOUT SCIATICA: ICD-10-CM

## 2023-12-08 PROCEDURE — 99214 OFFICE O/P EST MOD 30 MIN: CPT | Performed by: NURSE PRACTITIONER

## 2023-12-08 PROCEDURE — 99395 PREV VISIT EST AGE 18-39: CPT | Performed by: NURSE PRACTITIONER

## 2023-12-08 RX ORDER — ALPRAZOLAM 0.5 MG/1
0.5 TABLET ORAL 2 TIMES DAILY PRN
Qty: 30 TABLET | Refills: 0 | Status: SHIPPED | OUTPATIENT
Start: 2023-12-08

## 2023-12-08 RX ORDER — LORATADINE 10 MG/1
10 TABLET ORAL DAILY
Qty: 90 TABLET | Refills: 3 | Status: SHIPPED | OUTPATIENT
Start: 2023-12-08

## 2023-12-08 RX ORDER — CYCLOBENZAPRINE HCL 10 MG
10 TABLET ORAL 3 TIMES DAILY PRN
Qty: 60 TABLET | Refills: 0 | Status: SHIPPED | OUTPATIENT
Start: 2023-12-08

## 2023-12-08 NOTE — ASSESSMENT & PLAN NOTE
Xanax renewed but advised patient that this is not recommend din breast feedings. If she need to take this it would be in emergent setting and recommend not breast feeding 1-2 hours post administration as concentrations are high at this time. Risk of sedating infant.  Should consider alternative feeding for that day   Patient is on lexapro and currently doing well

## 2023-12-08 NOTE — ASSESSMENT & PLAN NOTE
Patient is monitoring her blood pressures 3 times per day.    She has instructions on when to contact them

## 2023-12-08 NOTE — PROGRESS NOTES
ADULT ANNUAL [id number]    NAME: Aniya Keene  AGE: 33 y.o. SEX: female  : 1995     DATE: 2023     Assessment and Plan:     Problem List Items Addressed This Visit        Endocrine    Diet controlled gestational diabetes mellitus (GDM) in third trimester - Primary     Patient will have to follow up on A1c and glucose after her postpartum period between 3-4 months     Lab Results   Component Value Date    HGBA1C 4.9 2022               Cardiovascular and Mediastinum    Preeclampsia with severe features     Patient is monitoring her blood pressures 3 times per day. She has instructions on when to contact them             Other    Chronic midline low back pain without sciatica     Refill flexeril. We did discuss once she is 6 weeks post partum should consider PT evaluation          Relevant Medications    cyclobenzaprine (FLEXERIL) 10 mg tablet    Anxiety     Xanax renewed but advised patient that this is not recommend din breast feedings. If she need to take this it would be in emergent setting and recommend not breast feeding 1-2 hours post administration as concentrations are high at this time. Risk of sedating infant. Should consider alternative feeding for that day   Patient is on lexapro and currently doing well          Relevant Medications    ALPRAZolam (XANAX) 0.5 mg tablet   Other Visit Diagnoses     PND (post-nasal drip)        Relevant Medications    loratadine (CLARITIN) 10 mg tablet    Annual physical exam              Immunizations and preventive care screenings were discussed with patient today. Appropriate education was printed on patient's after visit summary. Counseling:  Alcohol/drug use: discussed moderation in alcohol intake, the recommendations for healthy alcohol use, and avoidance of illicit drug use. Dental Health: discussed importance of regular tooth brushing, flossing, and dental visits.   Injury prevention: discussed safety/seat belts, safety helmets, smoke detectors, carbon dioxide detectors, and smoking near bedding or upholstery. Sexual health: discussed sexually transmitted diseases, partner selection, use of condoms, avoidance of unintended pregnancy, and contraceptive alternatives. Exercise: the importance of regular exercise/physical activity was discussed. Recommend exercise 3-5 times per week for at least 30 minutes. BMI Counseling: Body mass index is 39.99 kg/m². The BMI is above normal. Nutrition recommendations include decreasing portion sizes, moderation in carbohydrate intake, reducing intake of saturated and trans fat and reducing intake of cholesterol. Exercise recommendations include moderate physical activity 150 minutes/week and strength training exercises. Rationale for BMI follow-up plan is due to patient being overweight or obese. Return in about 1 year (around 2024) for Annual exam PE. Chief Complaint:     Chief Complaint   Patient presents with   • Physical Exam     Pt present today for her annual physical exam.      History of Present Illness:     Adult Annual Physical   Patient here for a comprehensive physical exam. The patient reports no problems. Patient presents today she is postpartum and doing well. Having some urinary incontinence     Diet and Physical Activity  Diet/Nutrition: well balanced diet. Exercise: walking. Depression Screening  PHQ-2/9 Depression Screening         General Health  Sleep: sleeps poorly and has  . Hearing: normal - bilateral.  Vision: goes for regular eye exams and most recent eye exam <1 year ago. Dental: regular dental visits. /GYN Health  Follows with gynecology? yes     Contraceptive method:  minipill . History of STDs?: no.     Advanced Care Planning  Do you have an advanced directive? no  Do you have a durable medical power of ?  no     Review of Systems:     Review of Systems Constitutional:  Positive for fatigue. Negative for activity change and appetite change. Respiratory:  Negative for chest tightness and shortness of breath. Cardiovascular:  Negative for chest pain, palpitations and leg swelling. Gastrointestinal:  Negative for abdominal pain. Genitourinary:  Negative for difficulty urinating. Neurological:  Negative for dizziness, light-headedness and headaches. Psychiatric/Behavioral:  Negative for dysphoric mood and sleep disturbance. The patient is not nervous/anxious.        Past Medical History:     Past Medical History:   Diagnosis Date   • Abnormal Pap smear of cervix     2021 Colpo: TORRES 1; 2022 pap negative   • Depression    • Frequent headaches    • Genetic screening     2023-CFCS negative   • Immunization, varicella    • Migraine    •  (normal spontaneous vaginal delivery)    • Varicella     states she was vaccinated but blood work from 3/2021 says non immune      Past Surgical History:     Past Surgical History:   Procedure Laterality Date   • INDUCED         Social History:     Social History     Socioeconomic History   • Marital status: Single     Spouse name: None   • Number of children: None   • Years of education: None   • Highest education level: None   Occupational History   • None   Tobacco Use   • Smoking status: Never   • Smokeless tobacco: Never   Vaping Use   • Vaping Use: Never used   Substance and Sexual Activity   • Alcohol use: Not Currently     Comment: social; none since knowledge of pregnancy   • Drug use: Never   • Sexual activity: Yes     Partners: Male     Birth control/protection: None   Other Topics Concern   • None   Social History Narrative     ancestry      Social Determinants of Health     Financial Resource Strain: Not on file   Food Insecurity: Not on file   Transportation Needs: Not on file   Physical Activity: Not on file   Stress: Not on file   Social Connections: Not on file   Intimate Partner Violence: Not on file   Housing Stability: Not on file      Family History:     Family History   Problem Relation Age of Onset   • Hyperlipidemia Mother    • Diabetes Mother    • Hypertension Mother    • Kidney disease Father    • Nephrolithiasis Sister    • No Known Problems Sister    • No Known Problems Brother    • No Known Problems Brother    • No Known Problems Brother    • Diabetes Maternal Grandmother    • COPD Maternal Grandmother    • Alcohol abuse Maternal Grandfather    • Diabetes Paternal Grandmother    • HIV Paternal Grandfather    • No Known Problems Daughter    • No Known Problems Family    • Breast cancer Neg Hx    • Colon cancer Neg Hx    • Ovarian cancer Neg Hx    • Cancer Neg Hx    • Pulmonary embolism Neg Hx    • Heart attack Neg Hx    • Stroke Neg Hx    • Miscarriages / Stillbirths Neg Hx    • Seizures Neg Hx    • Thyroid disease Neg Hx    • Transient ischemic attack Neg Hx       Current Medications:     Current Outpatient Medications   Medication Sig Dispense Refill   • acetaminophen (TYLENOL) 500 mg tablet Take 1,000 mg by mouth every 6 (six) hours as needed for mild pain     • ALPRAZolam (XANAX) 0.5 mg tablet Take 1 tablet (0.5 mg total) by mouth 2 (two) times a day as needed for anxiety 30 tablet 0   • cyclobenzaprine (FLEXERIL) 10 mg tablet Take 1 tablet (10 mg total) by mouth 3 (three) times a day as needed for muscle spasms 60 tablet 0   • ibuprofen (MOTRIN) 600 mg tablet Take 1 tablet (600 mg total) by mouth every 6 (six) hours 30 tablet 0   • loratadine (CLARITIN) 10 mg tablet Take 1 tablet (10 mg total) by mouth daily 90 tablet 3   • norethindrone (Ortho Micronor) 0.35 MG tablet Take 1 tablet (0.35 mg total) by mouth daily 28 tablet 12   • Blood Pressure Monitoring (Blood Pressure Cuff) MISC Use 3 (three) times a day 1 each 0   • escitalopram (Lexapro) 10 mg tablet Take 1 tablet (10 mg total) by mouth daily 30 tablet 2   • Prenatal Vit-Fe Fumarate-FA (Trinatal Rx 1) 60-1 MG TABS Take 1 tablet by mouth daily (Patient not taking: Reported on 12/6/2023)       No current facility-administered medications for this visit. Allergies:     No Known Allergies   Physical Exam:     /94 (BP Location: Left arm, Patient Position: Sitting, Cuff Size: Large)   Pulse 82   Ht 4' 11" (1.499 m)   Wt 89.8 kg (198 lb)   LMP 02/23/2023 (Approximate) Comment: irregular cycles  SpO2 97%   BMI 39.99 kg/m²     Physical Exam  Vitals and nursing note reviewed. Constitutional:       General: She is not in acute distress. Appearance: Normal appearance. She is well-developed. She is obese. She is not diaphoretic. HENT:      Head: Normocephalic and atraumatic. Right Ear: Tympanic membrane and external ear normal.      Left Ear: Tympanic membrane and external ear normal.      Nose: Nose normal.      Mouth/Throat:      Mouth: Mucous membranes are moist.      Pharynx: No oropharyngeal exudate or posterior oropharyngeal erythema. Eyes:      Extraocular Movements: Extraocular movements intact. Conjunctiva/sclera: Conjunctivae normal.      Pupils: Pupils are equal, round, and reactive to light. Neck:      Thyroid: No thyromegaly. Cardiovascular:      Rate and Rhythm: Normal rate and regular rhythm. Pulses:           Carotid pulses are 2+ on the right side and 2+ on the left side. Heart sounds: Normal heart sounds, S1 normal and S2 normal. No murmur heard. Pulmonary:      Effort: Pulmonary effort is normal.      Breath sounds: Normal breath sounds. Musculoskeletal:         General: Normal range of motion. Cervical back: Normal range of motion. Right lower leg: No edema. Left lower leg: No edema. Lymphadenopathy:      Cervical: No cervical adenopathy. Skin:     General: Skin is warm. Neurological:      Mental Status: She is alert and oriented to person, place, and time. Motor: Motor function is intact. Coordination: Coordination is intact.       Gait: Gait is intact. Psychiatric:         Mood and Affect: Mood normal.         Behavior: Behavior normal.         Thought Content:  Thought content normal.         Judgment: Judgment normal.          HALLIE Rios   [facility]

## 2023-12-08 NOTE — ASSESSMENT & PLAN NOTE
Patient will have to follow up on A1c and glucose after her postpartum period between 3-4 months     Lab Results   Component Value Date    HGBA1C 4.9 03/30/2022

## 2023-12-19 ENCOUNTER — TELEPHONE (OUTPATIENT)
Dept: NEPHROLOGY | Facility: CLINIC | Age: 28
End: 2023-12-19

## 2023-12-19 NOTE — TELEPHONE ENCOUNTER
Left a voicemail to patent please call the office back to schedule a 1 year follow up appointment with Dr. Car.

## 2023-12-27 DIAGNOSIS — F41.9 ANXIETY: ICD-10-CM

## 2023-12-28 RX ORDER — ALPRAZOLAM 0.5 MG/1
0.5 TABLET ORAL 2 TIMES DAILY PRN
Qty: 60 TABLET | Refills: 3 | Status: SHIPPED | OUTPATIENT
Start: 2023-12-28

## 2024-01-02 ENCOUNTER — TELEPHONE (OUTPATIENT)
Dept: OTHER | Facility: HOSPITAL | Age: 29
End: 2024-01-02

## 2024-01-03 DIAGNOSIS — F33.41 RECURRENT MAJOR DEPRESSIVE DISORDER, IN PARTIAL REMISSION (HCC): ICD-10-CM

## 2024-01-03 DIAGNOSIS — F41.1 GAD (GENERALIZED ANXIETY DISORDER): ICD-10-CM

## 2024-01-03 RX ORDER — ESCITALOPRAM OXALATE 10 MG/1
10 TABLET ORAL DAILY
Qty: 30 TABLET | Refills: 2 | Status: SHIPPED | OUTPATIENT
Start: 2024-01-03 | End: 2024-04-02

## 2024-01-12 ENCOUNTER — TELEMEDICINE (OUTPATIENT)
Dept: PSYCHIATRY | Facility: CLINIC | Age: 29
End: 2024-01-12

## 2024-01-12 DIAGNOSIS — Z13.29 SCREENING FOR ENDOCRINE, NUTRITIONAL, METABOLIC AND IMMUNITY DISORDER: ICD-10-CM

## 2024-01-12 DIAGNOSIS — Z13.21 SCREENING FOR ENDOCRINE, NUTRITIONAL, METABOLIC AND IMMUNITY DISORDER: ICD-10-CM

## 2024-01-12 DIAGNOSIS — Z13.0 SCREENING FOR ENDOCRINE, NUTRITIONAL, METABOLIC AND IMMUNITY DISORDER: ICD-10-CM

## 2024-01-12 DIAGNOSIS — F33.41 RECURRENT MAJOR DEPRESSIVE DISORDER, IN PARTIAL REMISSION (HCC): ICD-10-CM

## 2024-01-12 DIAGNOSIS — F41.1 GAD (GENERALIZED ANXIETY DISORDER): Primary | ICD-10-CM

## 2024-01-12 DIAGNOSIS — Z13.228 SCREENING FOR ENDOCRINE, NUTRITIONAL, METABOLIC AND IMMUNITY DISORDER: ICD-10-CM

## 2024-01-12 PROCEDURE — NC001 PR NO CHARGE: Performed by: PSYCHIATRY & NEUROLOGY
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
## 2024-01-16 NOTE — BH TREATMENT PLAN
TREATMENT PLAN        [facility]    Name and Date of Birth:  Mone Garces 28 y.o. 1995  Date of Treatment Plan: January 12, 2024  Diagnosis/Diagnoses:    1. VINEET (generalized anxiety disorder)    2. Recurrent major depressive disorder, in partial remission (HCC)    3. Screening for endocrine, nutritional, metabolic and immunity disorder        Strengths/Personal Resources for Self-Care: supportive family, supportive friends, taking medications as prescribed, ability to adapt to life changes, ability to communicate needs, ability to communicate well, ability to listen, ability to reason, ability to understand psychiatric illness, average or above intelligence, good physical health, good understanding of illness, motivation for treatment, ability to negotiate basic needs, stable employment, willingness to work on problems    Area/Areas of need: anxiety symptoms    Long Term Goal: maintain acceptable anxiety level  Target Date: 6 months - July 12, 2024  Person/Persons responsible for completion of goal: Mone and Malena Samuel MD     Short Term Objective (s) - How will we reach this goal?:   Take medications as prescribed  Attend psychiatry appointments regularly  Target Date: 6 months - July 12, 2024  Person/Persons Responsible for Completion of Goal: Mone     Progress Towards Goals: Continuing treatment    Treatment Modality: medication management every 1-3 months as needed  Review due 180 days from date of this plan: July 14, 2024   Expected length of service: Ongoing treatment    My physician and I have developed this plan together, and I agree to work on the goals and objectives. I understand the treatment goals that were developed for my treatment.    The treatment plan was created between Malena Samuel MD and Mone Garces on 01/12/24 at 9:50 AM but not signed at the time of the visit due to: virtual visit. The plan  was reviewed, and verbal consent was given.

## 2024-02-02 ENCOUNTER — TELEPHONE (OUTPATIENT)
Age: 29
End: 2024-02-02

## 2024-02-02 ENCOUNTER — PATIENT MESSAGE (OUTPATIENT)
Dept: FAMILY MEDICINE CLINIC | Facility: CLINIC | Age: 29
End: 2024-02-02

## 2024-02-02 DIAGNOSIS — F41.9 ANXIETY: ICD-10-CM

## 2024-02-02 RX ORDER — ALPRAZOLAM 0.5 MG/1
0.5 TABLET ORAL 2 TIMES DAILY PRN
Qty: 60 TABLET | Refills: 3 | Status: SHIPPED | OUTPATIENT
Start: 2024-02-02

## 2024-02-02 NOTE — TELEPHONE ENCOUNTER
Patient called to check the status of MY chart message,regarding refill of Alprazolam  upon chart review/patient message. Contacted practice/clinical no response, contacted practice/ clerical, Eric advised she would convey the patients message to NP/Clarisse Magallanes. Please advise patient at [phone].

## 2024-02-16 ENCOUNTER — TELEMEDICINE (OUTPATIENT)
Dept: PSYCHIATRY | Facility: CLINIC | Age: 29
End: 2024-02-16

## 2024-02-16 DIAGNOSIS — F33.41 RECURRENT MAJOR DEPRESSIVE DISORDER, IN PARTIAL REMISSION (HCC): ICD-10-CM

## 2024-02-16 DIAGNOSIS — F41.1 GAD (GENERALIZED ANXIETY DISORDER): Primary | ICD-10-CM

## 2024-02-21 ENCOUNTER — TELEPHONE (OUTPATIENT)
Dept: PSYCHIATRY | Facility: CLINIC | Age: 29
End: 2024-02-21

## 2024-02-21 NOTE — TELEPHONE ENCOUNTER
Left voicemail informing patient of the Psych Encounter form needing to be signed as a requirement from the insurance company for billing purposes. Patient can access form via [employer] and sign electronically.     Please make patient aware this form must be signed for each visit as a requirement to continue future visits with provider.

## 2024-03-14 ENCOUNTER — TELEPHONE (OUTPATIENT)
Age: 29
End: 2024-03-14

## 2024-03-15 ENCOUNTER — TELEMEDICINE (OUTPATIENT)
Dept: PSYCHIATRY | Facility: CLINIC | Age: 29
End: 2024-03-15

## 2024-03-15 DIAGNOSIS — Z91.199 NO-SHOW FOR APPOINTMENT: Primary | ICD-10-CM

## 2024-03-15 NOTE — PSYCH
No Call. No Show. No Charge    Mone Garces no showed 03/15/24 appointment.  Staff will call and reschedule the appointment for earliest convenience for the patient.      Treatment Plan not due at this session.

## 2024-03-22 ENCOUNTER — INITIAL PRENATAL (OUTPATIENT)
Dept: OBGYN CLINIC | Facility: CLINIC | Age: 29
End: 2024-03-22
Payer: COMMERCIAL

## 2024-03-22 VITALS
WEIGHT: 187.4 LBS | BODY MASS INDEX: 37.78 KG/M2 | SYSTOLIC BLOOD PRESSURE: 128 MMHG | DIASTOLIC BLOOD PRESSURE: 76 MMHG | HEIGHT: 59 IN

## 2024-03-22 DIAGNOSIS — O36.80X1 ENCOUNTER TO DETERMINE FETAL VIABILITY OF PREGNANCY, FETUS 1: Primary | ICD-10-CM

## 2024-03-22 PROCEDURE — 76817 TRANSVAGINAL US OBSTETRIC: CPT | Performed by: OBSTETRICS & GYNECOLOGY

## 2024-03-22 RX ORDER — ALUMINUM CHLORIDE 20 %
SOLUTION, NON-ORAL TOPICAL
COMMUNITY
Start: 2024-02-22

## 2024-03-22 NOTE — PROGRESS NOTES
FIRST TRIMESTER OBSTETRIC ULTRASOUND  3/22/2024   Babar Hyatt MD     INDICATION: Amenorrhea, viability  .  COMPARISON: None.     TECHNIQUE:   Transvaginal imaging was performed to assess the gestation, myometrial/endometrial architecture and ovarian parenchymal detail.    The study includes volumetric sweeps and traditional still imaging technique.      FINDINGS:     A single intrauterine gestation is identified.  Cardiac activity is detected at 118 bpm.      YOLK SAC:  Present and normal in size and appearance.  MEAN CROWN RUMP LENGTH:  5 mm = 6 weeks 1 days   AMNIOTIC FLUID/SAC SHAPE:  Within expected normal range.     UTERUS/ADNEXA:   No adnexal mass or pathologic cyst.  No free fluid identified.     IMPRESSION:     Single intrauterine pregnancy of 6 weeks 1d gestational age.  Fetal cardiac activity detected.  No adnexal masses seen.     Patient reports LMP as 1/10/24, US inconsistent with LMP dating. Will repeat US in 2 weeks for more accurate CRL assessment, and to establish the SOUMYA.

## 2024-04-03 ENCOUNTER — ULTRASOUND (OUTPATIENT)
Dept: OBGYN CLINIC | Facility: CLINIC | Age: 29
End: 2024-04-03
Payer: COMMERCIAL

## 2024-04-03 DIAGNOSIS — O36.80X0 ENCOUNTER TO DETERMINE FETAL VIABILITY OF PREGNANCY, SINGLE OR UNSPECIFIED FETUS: Primary | ICD-10-CM

## 2024-04-03 DIAGNOSIS — Z36.9 ANTENATAL SCREENING ENCOUNTER: ICD-10-CM

## 2024-04-03 PROCEDURE — 76817 TRANSVAGINAL US OBSTETRIC: CPT | Performed by: OBSTETRICS & GYNECOLOGY

## 2024-04-03 NOTE — PROGRESS NOTES
Procedures    Serial transvaginal images were obtained through the gravid maternal uterus. The patient's LMP was reported as 1/10/2024, however a previous ultrasound performed on 3/22/2024 is consistent with an SOUMYA of  11/14/2024 and a gestational age of  7w6d (based on JOSE ALEJANDRO).   The indication for today's examination is to confirm fetal size and viability.    CRL=  1.71mm    8w1d    SOUMYA 11/12/2024 by today's US  YS=  2.7mm  FHR=  168bpm    The uterus and bilateral ovaries appear within normal limits.     Uterus= 10.32 x 6.31 x 7.70cm  Rt Ovary= 3.57 x 1.67 x 2.23cm  Lt. Ovary= 2.85 x 1.88 x 2.17cm    Impression: Single, viable IUP.     Connie Meléndez RDMS    GE [id number] transvaginal transducer Serial # [id number] was used to perform the examination today and subsequently followed with high level disinfection utilizing Trophon EPR procedure.    Ultrasound performed at:     [facility] [id number]  Phone:  [phone]  Fax:  [phone]

## 2024-04-15 ENCOUNTER — OB ABSTRACT (OUTPATIENT)
Dept: OBGYN CLINIC | Facility: CLINIC | Age: 29
End: 2024-04-15

## 2024-04-15 ENCOUNTER — INITIAL PRENATAL (OUTPATIENT)
Dept: OBGYN CLINIC | Facility: CLINIC | Age: 29
End: 2024-04-15
Payer: COMMERCIAL

## 2024-04-15 VITALS
WEIGHT: 187 LBS | BODY MASS INDEX: 37.7 KG/M2 | HEIGHT: 59 IN | DIASTOLIC BLOOD PRESSURE: 88 MMHG | SYSTOLIC BLOOD PRESSURE: 128 MMHG

## 2024-04-15 DIAGNOSIS — Z34.81 SUPERVISION OF NORMAL INTRAUTERINE PREGNANCY IN MULTIGRAVIDA IN FIRST TRIMESTER: Primary | ICD-10-CM

## 2024-04-15 DIAGNOSIS — Z83.3 FAMILY HISTORY OF DIABETES MELLITUS: ICD-10-CM

## 2024-04-15 DIAGNOSIS — O09.299 HISTORY OF PRE-ECLAMPSIA IN PRIOR PREGNANCY, CURRENTLY PREGNANT: ICD-10-CM

## 2024-04-15 PROCEDURE — 99211 OFF/OP EST MAY X REQ PHY/QHP: CPT

## 2024-04-15 NOTE — PATIENT INSTRUCTIONS
Congratulations on your pregnancy!  We thank you for allowing us to participate in your care.    NEXT STEPS    Go to the lab to have your prenatal bloodwork competed if you have not already done so.  There is a listing of [facility] Laboratories and locations in your prenatal folder. You may also visit [email or web address] or call [phone].   Please be aware that some insurance companies may require you to go to a specific lab (ex. [facility] or [facility]). You can verify this by contacting your insurance company.   If you have decided to be screened for CF and SMA genetic testing, these tests require prior authorization and scheduling.  Prior Authorization is not a guarantee of payment. There may be out of pocket expenses that includes copays, deductibles and or coinsurance for your individual plan.  Please call [phone] if our team has not contacted you in 7 business days.  If you have decided to have genetic testing done at [facility], that will be scheduled by [facility]. You may have already scheduled this appointment.  If not, please call their office to schedule this appointment.  Based on the referral placed by our office, they will know how to schedule you appropriately.    Contact information for [facility] is located in your prenatal folder. The main phone number to their office is [phone]..   Return to our office for your first routine prenatal visit.   Some offices have multiple locations. Always check the address of your appointment to ensure you are going to the correct office.   If you experience any problems or concerns, call the office directly.  Remember to only use [facility] for none urgent concerns or questions.  Our doctors deliver at [facility] in [place]. The address is provided below.     [address] [address]    Please click on the link below to review our Pregnancy Essential Guide.    [facility] Pregnancy  Essentials Guide  [facility] ([email or web address])     Arlyn TAYLOR LPN  OB Nurse Navigator

## 2024-04-15 NOTE — PROGRESS NOTES
"OB INTAKE INTERVIEW April 15, 2024    Patient is 29 y.o. who presents for OB intake at 9w6d.  She is accompanied by her significant other and son during this encounter.  The father of her baby (Hari Cortés) is involved in the pregnancy and he is 30 years old.      Patient's last menstrual period was 01/10/2024 (approximate).  Ultrasound: Measured 8 weeks 1 days on 4/3/2024  Estimated Date of Delivery: 24 changed by dating ultrasound.    Signs/Symptoms of Pregnancy  Current pregnancy symptoms: frequent urination  Constipation no  Headaches YES, has a history of headaches-denies an increase of headaches since becoming pregnant  Cramping no  Spotting no  PICA cravings no    Diabetes-  There is no height or weight on file to calculate BMI.  If patient has 1 or more, please order early 1 hour GTT  History of GDM YES  BMI >35 YES  History of PCOS or current metformin use no  History of LGA/macrosomic infant (4000g/9lbs) no    If patient has 2 or more, please order early 1 hour GTT  BMI>30 YES  AMA no  First degree relative with type 2 diabetes YES-mother  History of chronic HTN, hyperlipidemia, elevated A1C no-States she does not have chronic HTN, but had a few higher readings and her PCP \"watches it\"  High risk race (, , ,  or ) YES    Hypertension- if you answer yes to any of the following, please order baseline preeclampsia labs (cbc, comprehensive metabolic panel, urine protein creatinine ratio, uric acid)  History of of chronic HTN no  History of gestational HTN YES  History of preeclampsia, eclampsia, or HELLP syndrome YES  History of diabetes no-did have gestational diabetes  History of lupus, autoimmune disease, kidney disease no    Thyroid- if yes order TSH with reflex T4  History of thyroid disease no    Bleeding Disorder or Hx of DVT-patient or first degree relative with history of. Order the following if not done previously.   (Factor V, " antithrombin III, prothrombin gene mutation, protein C and S Ag, lupus anticoagulant, anticardiolipin, beta-2 glycoprotein)   no    OB/GYN-  History of abnormal pap smear YES- 2021 Colpo: TORRES 1; 2022 pap negative    Date of last pap smear 2023  History of HPV no  History of Herpes/HSV no  History of other STI (gonorrhea, chlamydia, trich) no  History of prior  YES x2  History of prior  no  History of  delivery prior to 36 weeks 6 days no  History of blood transfusion no  Ok for blood transfusion YES    Substance screening-   History of tobacco use no  Currently using tobacco no  Currently using alcohol no  Presently using drugs no  Past drug use  no  IV drug use- no  Partner drug use no  Parent/Family drug use no  Substance Use Screen Level No risk    MRSA Screening-   Does the pt have a hx of MRSA? no    Immunizations:  Influenza vaccine given this season YES   Discussed Tdap vaccine YES   Discussed COVID Vaccine YES     Genetic/MFM-  Do you or your partner have a history of any of the following in yourselves or first degree relatives?  Cystic fibrosis no  Spinal muscular atrophy no  Hemoglobinopathy/Sickle Cell/Thalassemia no  Fragile X Intellectual Disability no    If yes, discuss Carrier Screening and recommend consultation with MFM/Genetic Counseling and place specific [facility] Referral for.    If no, discuss Carrier Screening being completed once in a lifetime as a standard of care lab test. Place orders for Cystic Fibrosis Gene Test (UIB103) and Spinal Muscular Atrophy DNA ([id number]).  Patient was informed that prior authorization needs to be completed for these tests and this may take 7-10 business days.  Patient does not desire testing for Cystic Fibrosis and Spinal Muscular Atrophy.  CF was done on 2023.    Appointment for Nuchal Translucency Ultrasound at [facility] is scheduled for 5/3/2024.    Interview education  [facility] Pregnancy Essentials Book reviewed, discussed and attached  to their AVS YES     Nurse/Family Partnership-patient may qualify NO; referral placed NO     Prenatal lab work scripts YES    Extra labs ordered: 1 hour GTT, CMP, Protein/creatinine ratio urine, and Uric Acid    Aspirin/Preeclampsia Screen    Risk Level Risk Factor Recommendation   LOW Prior Uncomplicated full-term delivery YES No Aspirin recommendation        MODERATE Nulliparity no Recommend low-dose aspirin if     BMI>30 YES 2 or more moderate risk factors    Family History Preeclampsia (mother/sister) no     35yr old or greater no      or Low Socioeconomic no     IVF Pregnancy  no     Personal History Risks (low birth weight, prior adverse preg outcome, >10yr preg interval) no         HIGH History of Preeclampsia YES Recommend low-dose aspirin if     Multifetal gestation no 1 or more high risk factors    Chronic HTN no     Type 1 or 2 Diabetes no     Renal Disease no     Autoimmune Disease  no      Contraindications to ASA therapy:  NSAID/ ASA allergy: no  Nasal polyps: no  Asthma with history of ASA induced bronchospasm: no  Relative contraindications:  History of GI bleed: no  Active peptic ulcer disease: no  Severe hepatic dysfunction: no    Patient does meet recommendation to take ASA 162mg during this pregnancy from 12-36wks to lower her risk of preeclampsia.  Instructions given and patient verbalized understanding.    Mental Health:  Depression: YES, Anxiety: YES,  Medications: YES-was on Lexapro in the past, but not currently using medication   EPDS Screen:  Negative   Score:  1    The patient has a history now or in prior pregnancy notable for: pre-eclampsia, gestational diabetes, and short interval pregnancy.    Details that I feel the provider should be aware of: History of abnormal pap.  Had colpo last pregnancy and was to repeat it after delivery.  Did not have repeat colpo done prior to this pregnancy.    PN1 visit scheduled. The patient was oriented to our practice, the navigator  role, reviewed delivering physicians and [facility] for delivery. All questions were answered.    Interviewed by: OLGA SHAFFER LPN

## 2024-04-16 ENCOUNTER — APPOINTMENT (OUTPATIENT)
Dept: LAB | Facility: HOSPITAL | Age: 29
End: 2024-04-16
Payer: COMMERCIAL

## 2024-04-16 DIAGNOSIS — O09.299 HISTORY OF PRE-ECLAMPSIA IN PRIOR PREGNANCY, CURRENTLY PREGNANT: ICD-10-CM

## 2024-04-16 DIAGNOSIS — Z13.29 SCREENING FOR ENDOCRINE, NUTRITIONAL, METABOLIC AND IMMUNITY DISORDER: ICD-10-CM

## 2024-04-16 DIAGNOSIS — Z34.81 SUPERVISION OF NORMAL INTRAUTERINE PREGNANCY IN MULTIGRAVIDA IN FIRST TRIMESTER: ICD-10-CM

## 2024-04-16 DIAGNOSIS — Z83.3 FAMILY HISTORY OF DIABETES MELLITUS: ICD-10-CM

## 2024-04-16 DIAGNOSIS — Z13.228 SCREENING FOR ENDOCRINE, NUTRITIONAL, METABOLIC AND IMMUNITY DISORDER: ICD-10-CM

## 2024-04-16 DIAGNOSIS — Z13.0 SCREENING FOR ENDOCRINE, NUTRITIONAL, METABOLIC AND IMMUNITY DISORDER: ICD-10-CM

## 2024-04-16 DIAGNOSIS — Z13.21 SCREENING FOR ENDOCRINE, NUTRITIONAL, METABOLIC AND IMMUNITY DISORDER: ICD-10-CM

## 2024-04-16 LAB
ABO GROUP BLD: NORMAL
ALBUMIN SERPL BCP-MCNC: 4 G/DL (ref 3.5–5)
ALP SERPL-CCNC: 59 U/L (ref 34–104)
ALT SERPL W P-5'-P-CCNC: 17 U/L (ref 7–52)
ANION GAP SERPL CALCULATED.3IONS-SCNC: 6 MMOL/L (ref 4–13)
AST SERPL W P-5'-P-CCNC: 14 U/L (ref 13–39)
BACTERIA UR QL AUTO: ABNORMAL /HPF
BASOPHILS # BLD AUTO: 0.03 THOUSANDS/ÂΜL (ref 0–0.1)
BASOPHILS NFR BLD AUTO: 1 % (ref 0–1)
BILIRUB SERPL-MCNC: 0.29 MG/DL (ref 0.2–1)
BILIRUB UR QL STRIP: NEGATIVE
BLD GP AB SCN SERPL QL: POSITIVE
BLOOD GROUP ANTIBODIES SERPL: NORMAL
BUN SERPL-MCNC: 11 MG/DL (ref 5–25)
CALCIUM SERPL-MCNC: 9.6 MG/DL (ref 8.4–10.2)
CHLORIDE SERPL-SCNC: 103 MMOL/L (ref 96–108)
CLARITY UR: CLEAR
CO2 SERPL-SCNC: 25 MMOL/L (ref 21–32)
COLOR UR: ABNORMAL
CREAT SERPL-MCNC: 0.47 MG/DL (ref 0.6–1.3)
CREAT UR-MCNC: 171 MG/DL
EOSINOPHIL # BLD AUTO: 0.2 THOUSAND/ÂΜL (ref 0–0.61)
EOSINOPHIL NFR BLD AUTO: 3 % (ref 0–6)
ERYTHROCYTE [DISTWIDTH] IN BLOOD BY AUTOMATED COUNT: 12.9 % (ref 11.6–15.1)
GFR SERPL CREATININE-BSD FRML MDRD: 134 ML/MIN/1.73SQ M
GLUCOSE 1H P 50 G GLC PO SERPL-MCNC: 131 MG/DL (ref 70–134)
GLUCOSE SERPL-MCNC: 131 MG/DL (ref 65–140)
GLUCOSE UR STRIP-MCNC: ABNORMAL MG/DL
HBV SURFACE AB SER-ACNC: 7.14 MIU/ML
HBV SURFACE AG SER QL: NORMAL
HCT VFR BLD AUTO: 36.9 % (ref 34.8–46.1)
HCV AB SER QL: NORMAL
HGB BLD-MCNC: 12.3 G/DL (ref 11.5–15.4)
HGB UR QL STRIP.AUTO: NEGATIVE
HIV 1+2 AB+HIV1 P24 AG SERPL QL IA: NORMAL
HIV 2 AB SERPL QL IA: NORMAL
HIV1 AB SERPL QL IA: NORMAL
HIV1 P24 AG SERPL QL IA: NORMAL
IMM GRANULOCYTES # BLD AUTO: 0.01 THOUSAND/UL (ref 0–0.2)
IMM GRANULOCYTES NFR BLD AUTO: 0 % (ref 0–2)
KETONES UR STRIP-MCNC: NEGATIVE MG/DL
LEUKOCYTE ESTERASE UR QL STRIP: NEGATIVE
LYMPHOCYTES # BLD AUTO: 1.09 THOUSANDS/ÂΜL (ref 0.6–4.47)
LYMPHOCYTES NFR BLD AUTO: 17 % (ref 14–44)
MCH RBC QN AUTO: 28 PG (ref 26.8–34.3)
MCHC RBC AUTO-ENTMCNC: 33.3 G/DL (ref 31.4–37.4)
MCV RBC AUTO: 84 FL (ref 82–98)
MONOCYTES # BLD AUTO: 0.29 THOUSAND/ÂΜL (ref 0.17–1.22)
MONOCYTES NFR BLD AUTO: 5 % (ref 4–12)
MUCOUS THREADS UR QL AUTO: ABNORMAL
NEUTROPHILS # BLD AUTO: 4.8 THOUSANDS/ÂΜL (ref 1.85–7.62)
NEUTS SEG NFR BLD AUTO: 74 % (ref 43–75)
NITRITE UR QL STRIP: NEGATIVE
NON-SQ EPI CELLS URNS QL MICRO: ABNORMAL /HPF
NRBC BLD AUTO-RTO: 0 /100 WBCS
PH UR STRIP.AUTO: 6 [PH]
PLATELET # BLD AUTO: 321 THOUSANDS/UL (ref 149–390)
PMV BLD AUTO: 9.1 FL (ref 8.9–12.7)
POTASSIUM SERPL-SCNC: 3.4 MMOL/L (ref 3.5–5.3)
PROT SERPL-MCNC: 6.9 G/DL (ref 6.4–8.4)
PROT UR STRIP-MCNC: ABNORMAL MG/DL
PROT UR-MCNC: 23 MG/DL
PROT/CREAT UR: 0.13 MG/G{CREAT} (ref 0–0.1)
RBC # BLD AUTO: 4.39 MILLION/UL (ref 3.81–5.12)
RBC #/AREA URNS AUTO: ABNORMAL /HPF
RH BLD: POSITIVE
RUBV IGG SERPL IA-ACNC: 23.4 IU/ML
SODIUM SERPL-SCNC: 134 MMOL/L (ref 135–147)
SP GR UR STRIP.AUTO: 1.03 (ref 1–1.03)
SPECIMEN EXPIRATION DATE: NORMAL
TREPONEMA PALLIDUM IGG+IGM AB [PRESENCE] IN SERUM OR PLASMA BY IMMUNOASSAY: NORMAL
URATE SERPL-MCNC: 4.6 MG/DL (ref 2–7.5)
UROBILINOGEN UR STRIP-ACNC: <2 MG/DL
VZV IGG SER QL IA: ABNORMAL
WBC # BLD AUTO: 6.42 THOUSAND/UL (ref 4.31–10.16)
WBC #/AREA URNS AUTO: ABNORMAL /HPF

## 2024-04-16 PROCEDURE — 86900 BLOOD TYPING SEROLOGIC ABO: CPT

## 2024-04-16 PROCEDURE — 87340 HEPATITIS B SURFACE AG IA: CPT

## 2024-04-16 PROCEDURE — 84156 ASSAY OF PROTEIN URINE: CPT

## 2024-04-16 PROCEDURE — 86870 RBC ANTIBODY IDENTIFICATION: CPT

## 2024-04-16 PROCEDURE — 86901 BLOOD TYPING SEROLOGIC RH(D): CPT

## 2024-04-16 PROCEDURE — 81001 URINALYSIS AUTO W/SCOPE: CPT

## 2024-04-16 PROCEDURE — 87147 CULTURE TYPE IMMUNOLOGIC: CPT

## 2024-04-16 PROCEDURE — 87086 URINE CULTURE/COLONY COUNT: CPT

## 2024-04-16 PROCEDURE — 87389 HIV-1 AG W/HIV-1&-2 AB AG IA: CPT

## 2024-04-16 PROCEDURE — 86850 RBC ANTIBODY SCREEN: CPT

## 2024-04-16 PROCEDURE — 85025 COMPLETE CBC W/AUTO DIFF WBC: CPT

## 2024-04-16 PROCEDURE — 84550 ASSAY OF BLOOD/URIC ACID: CPT

## 2024-04-16 PROCEDURE — 86803 HEPATITIS C AB TEST: CPT

## 2024-04-16 PROCEDURE — 86762 RUBELLA ANTIBODY: CPT

## 2024-04-16 PROCEDURE — 36415 COLL VENOUS BLD VENIPUNCTURE: CPT

## 2024-04-16 PROCEDURE — 86706 HEP B SURFACE ANTIBODY: CPT

## 2024-04-16 PROCEDURE — 80053 COMPREHEN METABOLIC PANEL: CPT

## 2024-04-16 PROCEDURE — 86780 TREPONEMA PALLIDUM: CPT

## 2024-04-16 PROCEDURE — 86787 VARICELLA-ZOSTER ANTIBODY: CPT

## 2024-04-16 PROCEDURE — 82570 ASSAY OF URINE CREATININE: CPT

## 2024-04-16 PROCEDURE — 82950 GLUCOSE TEST: CPT

## 2024-04-17 LAB — BACTERIA UR CULT: ABNORMAL

## 2024-04-26 PROBLEM — O09.899 SUSCEPTIBLE TO VARICELLA (NON-IMMUNE), CURRENTLY PREGNANT: Status: ACTIVE | Noted: 2024-04-26

## 2024-04-26 PROBLEM — O09.899 SHORT INTERVAL BETWEEN PREGNANCIES AFFECTING PREGNANCY, ANTEPARTUM: Status: ACTIVE | Noted: 2024-04-26

## 2024-04-26 PROBLEM — Z34.90 ENCOUNTER FOR INDUCTION OF LABOR: Status: RESOLVED | Noted: 2023-12-03 | Resolved: 2024-04-26

## 2024-04-26 PROBLEM — R82.71 GBS BACTERIURIA: Status: ACTIVE | Noted: 2024-04-26

## 2024-04-26 PROBLEM — Z3A.12 12 WEEKS GESTATION OF PREGNANCY: Status: ACTIVE | Noted: 2024-04-26

## 2024-04-26 PROBLEM — O99.343 DEPRESSION AFFECTING PREGNANCY IN THIRD TRIMESTER, ANTEPARTUM: Status: RESOLVED | Noted: 2023-06-21 | Resolved: 2024-04-26

## 2024-04-26 PROBLEM — O99.213 OBESITY AFFECTING PREGNANCY IN THIRD TRIMESTER: Status: RESOLVED | Noted: 2023-06-21 | Resolved: 2024-04-26

## 2024-04-26 PROBLEM — F32.A DEPRESSION AFFECTING PREGNANCY IN THIRD TRIMESTER, ANTEPARTUM: Status: RESOLVED | Noted: 2023-06-21 | Resolved: 2024-04-26

## 2024-04-26 PROBLEM — O09.299 HX OF PREECLAMPSIA, PRIOR PREGNANCY, CURRENTLY PREGNANT: Status: ACTIVE | Noted: 2024-04-26

## 2024-04-26 PROBLEM — O24.410 DIET CONTROLLED GESTATIONAL DIABETES MELLITUS (GDM) IN THIRD TRIMESTER: Status: RESOLVED | Noted: 2023-10-06 | Resolved: 2024-04-26

## 2024-04-26 PROBLEM — O14.13 SEVERE PRE-ECLAMPSIA IN THIRD TRIMESTER: Status: RESOLVED | Noted: 2023-12-03 | Resolved: 2024-04-26

## 2024-04-26 PROBLEM — Z3A.39 39 WEEKS GESTATION OF PREGNANCY: Status: RESOLVED | Noted: 2023-06-21 | Resolved: 2024-04-26

## 2024-04-26 PROBLEM — O36.1910: Status: ACTIVE | Noted: 2024-04-26

## 2024-04-26 PROBLEM — Z28.39 SUSCEPTIBLE TO VARICELLA (NON-IMMUNE), CURRENTLY PREGNANT: Status: ACTIVE | Noted: 2024-04-26

## 2024-05-03 ENCOUNTER — ROUTINE PRENATAL (OUTPATIENT)
Dept: PERINATAL CARE | Facility: CLINIC | Age: 29
End: 2024-05-03
Payer: COMMERCIAL

## 2024-05-03 VITALS
DIASTOLIC BLOOD PRESSURE: 62 MMHG | HEART RATE: 93 BPM | BODY MASS INDEX: 38.02 KG/M2 | WEIGHT: 188.6 LBS | HEIGHT: 59 IN | SYSTOLIC BLOOD PRESSURE: 124 MMHG

## 2024-05-03 DIAGNOSIS — Z3A.12 12 WEEKS GESTATION OF PREGNANCY: ICD-10-CM

## 2024-05-03 DIAGNOSIS — O36.1910 ANTI-M ISOIMMUNIZATION AFFECTING PREGNANCY IN FIRST TRIMESTER: ICD-10-CM

## 2024-05-03 DIAGNOSIS — Z33.1 PREGNANT STATE, INCIDENTAL: ICD-10-CM

## 2024-05-03 DIAGNOSIS — O09.899 SHORT INTERVAL BETWEEN PREGNANCIES AFFECTING PREGNANCY, ANTEPARTUM: ICD-10-CM

## 2024-05-03 DIAGNOSIS — Z36.82 ENCOUNTER FOR (NT) NUCHAL TRANSLUCENCY SCAN: Primary | ICD-10-CM

## 2024-05-03 DIAGNOSIS — Z36.9 ANTENATAL SCREENING ENCOUNTER: ICD-10-CM

## 2024-05-03 DIAGNOSIS — O09.299 HX OF PREECLAMPSIA, PRIOR PREGNANCY, CURRENTLY PREGNANT: ICD-10-CM

## 2024-05-03 DIAGNOSIS — Z36.9 UNSPECIFIED ANTENATAL SCREENING: ICD-10-CM

## 2024-05-03 DIAGNOSIS — O99.211 OBESITY AFFECTING PREGNANCY IN FIRST TRIMESTER, UNSPECIFIED OBESITY TYPE: ICD-10-CM

## 2024-05-03 PROBLEM — E66.01 MORBID OBESITY WITH BMI OF 40.0-44.9, ADULT (HCC): Status: RESOLVED | Noted: 2023-12-03 | Resolved: 2024-05-03

## 2024-05-03 PROCEDURE — 99244 OFF/OP CNSLTJ NEW/EST MOD 40: CPT | Performed by: STUDENT IN AN ORGANIZED HEALTH CARE EDUCATION/TRAINING PROGRAM

## 2024-05-03 PROCEDURE — 76813 OB US NUCHAL MEAS 1 GEST: CPT | Performed by: STUDENT IN AN ORGANIZED HEALTH CARE EDUCATION/TRAINING PROGRAM

## 2024-05-03 PROCEDURE — 76801 OB US < 14 WKS SINGLE FETUS: CPT | Performed by: STUDENT IN AN ORGANIZED HEALTH CARE EDUCATION/TRAINING PROGRAM

## 2024-05-03 PROCEDURE — 36415 COLL VENOUS BLD VENIPUNCTURE: CPT | Performed by: STUDENT IN AN ORGANIZED HEALTH CARE EDUCATION/TRAINING PROGRAM

## 2024-05-03 NOTE — PROGRESS NOTES
"[facility]: Ms. Garces was seen today for nuchal translucency ultrasound.  See ultrasound report under \"OB Procedures\" tab.        Physical Exam  Constitutional:       General: She is not in acute distress.     Appearance: Normal appearance.   HENT:      Head: Normocephalic and atraumatic.   Eyes:      Extraocular Movements: Extraocular movements intact.   Cardiovascular:      Rate and Rhythm: Normal rate.   Pulmonary:      Effort: Pulmonary effort is normal. No respiratory distress.   Skin:     Findings: No erythema or rash.   Neurological:      Mental Status: She is alert and oriented to person, place, and time.   Psychiatric:         Mood and Affect: Mood normal.         Behavior: Behavior normal.         Please don't hesitate to contact our office with any concerns or questions.  -Grace Melendrez MD    "

## 2024-05-03 NOTE — LETTER
"May 3, 2024     Stacey Fournier MD  [address]  [address]  [place] [address]    Patient: Mone Garces   YOB: 1995   Date of Visit: 5/3/2024       Dear Dr. Fournier:    Thank you for referring Mone Garces to me for evaluation. Below are my notes for this consultation.    If you have questions, please do not hesitate to call me. I look forward to following your patient along with you.         Sincerely,        Grace Melendrez MD        CC: No Recipients    Grace Melendrez MD  5/3/2024  1:47 PM  Sign when Signing Visit  [facility]: Ms. Garces was seen today for nuchal translucency ultrasound.  See ultrasound report under \"OB Procedures\" tab.        Physical Exam  Constitutional:       General: She is not in acute distress.     Appearance: Normal appearance.   HENT:      Head: Normocephalic and atraumatic.   Eyes:      Extraocular Movements: Extraocular movements intact.   Cardiovascular:      Rate and Rhythm: Normal rate.   Pulmonary:      Effort: Pulmonary effort is normal. No respiratory distress.   Skin:     Findings: No erythema or rash.   Neurological:      Mental Status: She is alert and oriented to person, place, and time.   Psychiatric:         Mood and Affect: Mood normal.         Behavior: Behavior normal.         Please don't hesitate to contact our office with any concerns or questions.  -Grace Melendrez MD    "

## 2024-05-08 LAB
CFDNA.FET/CFDNA.TOTAL SFR FETUS: NORMAL %
CITATION REF LAB TEST: NORMAL
FET 13+18+21+X+Y ANEUP PLAS.CFDNA: NEGATIVE
FET CHR 21 TS PLAS.CFDNA QL: NEGATIVE
FET CHR 21 TS PLAS.CFDNA QL: NEGATIVE
FET MS X RISK WBC.DNA+CFDNA QL: NOT DETECTED
FET SEX PLAS.CFDNA DOSAGE CFDNA: NORMAL
FET TS 13 RISK PLAS.CFDNA QL: NEGATIVE
FET X + Y ANEUP RISK PLAS.CFDNA SEQ-IMP: NOT DETECTED
GA EST FROM CONCEPTION DATE: NORMAL D
GESTATIONAL AGE > 9:: YES
LAB DIRECTOR NAME PROVIDER: NORMAL
LAB DIRECTOR NAME PROVIDER: NORMAL
LABORATORY COMMENT REPORT: NORMAL
LIMITATIONS OF THE TEST: NORMAL
NEGATIVE PREDICTIVE VALUE: NORMAL
PERFORMANCE CHARACTERISTICS: NORMAL
POSITIVE PREDICTIVE VALUE: NORMAL
REF LAB TEST METHOD: NORMAL
SL AMB NOTE:: NORMAL
TEST PERFORMANCE INFO SPEC: NORMAL

## 2024-05-09 ENCOUNTER — TELEPHONE (OUTPATIENT)
Age: 29
End: 2024-05-09

## 2024-05-09 NOTE — TELEPHONE ENCOUNTER
Pt calling to review NIPS testing results as well as gender. RN provided information requested. No further questions.

## 2024-05-14 PROBLEM — Z3A.14 14 WEEKS GESTATION OF PREGNANCY: Status: ACTIVE | Noted: 2024-04-26

## 2024-05-24 ENCOUNTER — TELEPHONE (OUTPATIENT)
Dept: PSYCHIATRY | Facility: CLINIC | Age: 29
End: 2024-05-24

## 2024-05-28 ENCOUNTER — APPOINTMENT (OUTPATIENT)
Dept: LAB | Facility: CLINIC | Age: 29
End: 2024-05-28
Payer: COMMERCIAL

## 2024-05-28 ENCOUNTER — INITIAL PRENATAL (OUTPATIENT)
Dept: OBGYN CLINIC | Facility: CLINIC | Age: 29
End: 2024-05-28
Payer: COMMERCIAL

## 2024-05-28 VITALS
OXYGEN SATURATION: 98 % | HEART RATE: 85 BPM | WEIGHT: 187.2 LBS | DIASTOLIC BLOOD PRESSURE: 86 MMHG | BODY MASS INDEX: 37.74 KG/M2 | SYSTOLIC BLOOD PRESSURE: 122 MMHG | HEIGHT: 59 IN

## 2024-05-28 DIAGNOSIS — Z28.39 SUSCEPTIBLE TO VARICELLA (NON-IMMUNE), CURRENTLY PREGNANT: ICD-10-CM

## 2024-05-28 DIAGNOSIS — O09.899 SHORT INTERVAL BETWEEN PREGNANCIES AFFECTING PREGNANCY, ANTEPARTUM: ICD-10-CM

## 2024-05-28 DIAGNOSIS — O09.899 SUSCEPTIBLE TO VARICELLA (NON-IMMUNE), CURRENTLY PREGNANT: ICD-10-CM

## 2024-05-28 DIAGNOSIS — Z3A.16 16 WEEKS GESTATION OF PREGNANCY: ICD-10-CM

## 2024-05-28 DIAGNOSIS — R82.71 GBS BACTERIURIA: ICD-10-CM

## 2024-05-28 DIAGNOSIS — O36.1910 ANTI-M ISOIMMUNIZATION AFFECTING PREGNANCY IN FIRST TRIMESTER: ICD-10-CM

## 2024-05-28 DIAGNOSIS — Z12.4 CERVICAL CANCER SCREENING: ICD-10-CM

## 2024-05-28 DIAGNOSIS — O09.92 ENCOUNTER FOR SUPERVISION OF HIGH RISK PREGNANCY IN SECOND TRIMESTER, ANTEPARTUM: Primary | ICD-10-CM

## 2024-05-28 DIAGNOSIS — O09.299 HX OF PREECLAMPSIA, PRIOR PREGNANCY, CURRENTLY PREGNANT: ICD-10-CM

## 2024-05-28 PROCEDURE — G0145 SCR C/V CYTO,THINLAYER,RESCR: HCPCS | Performed by: OBSTETRICS & GYNECOLOGY

## 2024-05-28 PROCEDURE — G0476 HPV COMBO ASSAY CA SCREEN: HCPCS | Performed by: OBSTETRICS & GYNECOLOGY

## 2024-05-28 PROCEDURE — 99213 OFFICE O/P EST LOW 20 MIN: CPT | Performed by: OBSTETRICS & GYNECOLOGY

## 2024-05-28 PROCEDURE — 87491 CHLMYD TRACH DNA AMP PROBE: CPT | Performed by: OBSTETRICS & GYNECOLOGY

## 2024-05-28 PROCEDURE — 82105 ALPHA-FETOPROTEIN SERUM: CPT

## 2024-05-28 PROCEDURE — 87591 N.GONORRHOEAE DNA AMP PROB: CPT | Performed by: OBSTETRICS & GYNECOLOGY

## 2024-05-28 PROCEDURE — 36415 COLL VENOUS BLD VENIPUNCTURE: CPT

## 2024-05-28 NOTE — PROGRESS NOTES
"Assessment & Plan  29 y.o.  at 16w0d presenting for routine prenatal visit.     Problem List       Obesity (BMI 30-39.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 of cervix    Overview     Repeat pap smear [ ]         Migraine without aura and without status migrainosus, not intractable    Chronic midline low back pain without sciatica    Chronic fatigue    Dry eyes    Vitamin D deficiency    Nephrocalcinosis    Anxiety    Myofascial pain    Bronchitis    Positive GBS test    Overview     Needs PCN in labor         16 weeks gestation of pregnancy    Overview     LDASA   MSAFP ordered  Delivery consent [ ]  Tdap [ ]  GBS bacteruria          Short interval between pregnancies affecting pregnancy, antepartum    Hx of preeclampsia, prior pregnancy, currently pregnant    Overview     LDASA          GBS bacteriuria    Overview     PCN during labor         Susceptible to varicella (non-immune), currently pregnant    Overview     Varivax postpartum         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   She denies contractions, loss of fluid, or vaginal bleeding.   She feels regular fetal movements.     Objective  /86 (BP Location: Left arm, Patient Position: Sitting, Cuff Size: Large)   Pulse 85   Ht 4' 11\" (1.499 m)   Wt 84.9 kg (187 lb 3.2 oz)   LMP 01/10/2024 (Approximate)   SpO2 98%   BMI 37.81 kg/m²   FHR: 157 bpm    Physical Exam  Constitutional:       Appearance: Normal appearance.   HENT:      " Head: Normocephalic and atraumatic.   Cardiovascular:      Rate and Rhythm: Normal rate.   Pulmonary:      Effort: Pulmonary effort is normal. No respiratory distress.   Abdominal:      Palpations: Abdomen is soft.      Tenderness: There is no abdominal tenderness.   Musculoskeletal:         General: Normal range of motion.   Neurological:      Mental Status: She is alert.   Skin:     General: Skin is warm and dry.          Patient's Active Problem List  Patient Active Problem List   Diagnosis    Obesity (BMI 30-39.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 of cervix    Migraine without aura and without status migrainosus, not intractable    Chronic midline low back pain without sciatica    Chronic fatigue    Dry eyes    Vitamin D deficiency    Nephrocalcinosis    Anxiety    Myofascial pain    Bronchitis    Positive GBS test    16 weeks gestation of pregnancy    Short interval between pregnancies affecting pregnancy, antepartum    Hx of preeclampsia, prior pregnancy, currently pregnant    GBS bacteriuria    Susceptible to varicella (non-immune), currently pregnant    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

## 2024-05-29 LAB
HPV HR 12 DNA CVX QL NAA+PROBE: NEGATIVE
HPV16 DNA CVX QL NAA+PROBE: NEGATIVE
HPV18 DNA CVX QL NAA+PROBE: NEGATIVE

## 2024-05-30 LAB
C TRACH DNA SPEC QL NAA+PROBE: NEGATIVE
N GONORRHOEA DNA SPEC QL NAA+PROBE: NEGATIVE

## 2024-05-31 LAB
2ND TRIMESTER 4 SCREEN SERPL-IMP: NORMAL
AFP ADJ MOM SERPL: 1.71
AFP INTERP AMN-IMP: NORMAL
AFP INTERP SERPL-IMP: NORMAL
AFP INTERP SERPL-IMP: NORMAL
AFP SERPL-MCNC: 50.2 NG/ML
AGE AT DELIVERY: 28.7 YR
GA METHOD: NORMAL
GA: 16 WEEKS
IDDM PATIENT QL: NO
MULTIPLE PREGNANCY: NO
NEURAL TUBE DEFECT RISK FETUS: 1590 %

## 2024-06-05 DIAGNOSIS — O36.1910 ANTI-M ISOIMMUNIZATION AFFECTING PREGNANCY IN FIRST TRIMESTER: Primary | ICD-10-CM

## 2024-06-06 LAB
LAB AP GYN PRIMARY INTERPRETATION: NORMAL
LAB AP LMP: NORMAL
Lab: NORMAL

## 2024-06-07 ENCOUNTER — TELEPHONE (OUTPATIENT)
Dept: OBGYN CLINIC | Facility: CLINIC | Age: 29
End: 2024-06-07

## 2024-06-07 ENCOUNTER — PATIENT MESSAGE (OUTPATIENT)
Dept: OBGYN CLINIC | Facility: CLINIC | Age: 29
End: 2024-06-07

## 2024-06-10 DIAGNOSIS — Z3A.16 16 WEEKS GESTATION OF PREGNANCY: Primary | ICD-10-CM

## 2024-06-10 RX ORDER — PRENATAL VIT 27,CALC/IRON/FA 60 MG-1 MG
1 TABLET ORAL DAILY
Qty: 30 TABLET | Refills: 6 | Status: SHIPPED | OUTPATIENT
Start: 2024-06-10

## 2024-06-12 ENCOUNTER — TELEPHONE (OUTPATIENT)
Dept: OBGYN CLINIC | Facility: CLINIC | Age: 29
End: 2024-06-12

## 2024-06-12 NOTE — PATIENT COMMUNICATION
Spoke to patient on the phone.  Second trimester assessment completed.  EPDS score was 12.  Patient has a follow up appt scheduled with psychiatrist.

## 2024-06-12 NOTE — TELEPHONE ENCOUNTER
Overall how are you doing? Patient complaining of lower back pain.  Feels good otherwise.    Compliant with routine OB care appointments?   Have you completed your 1st trimester labs? Yes    If you had NIPS with MFM, do you have a order for MSAFP? Yes-completed   Can be completed 15w-22w9d, ideally 16w-18w    Have you seen MFM and do you have your detailed US scheduled? Yes    Pregnancy Education-have you had a chance to review the classes offered and registered? Yes, patient is interested and plans to register for classes.    EPDS Score:  12  Followed by Psychiatry- Dr. Samuel.  Has follow up appt scheduled.

## 2024-06-18 ENCOUNTER — TELEPHONE (OUTPATIENT)
Age: 29
End: 2024-06-18

## 2024-06-18 NOTE — TELEPHONE ENCOUNTER
No documentation was shown as to who reached out, but pt did inform us she will get her 6/5 lab work done tomorrow if that was the reason for the previous call

## 2024-06-20 PROBLEM — Z3A.19 19 WEEKS GESTATION OF PREGNANCY: Status: ACTIVE | Noted: 2024-04-26

## 2024-06-24 ENCOUNTER — TELEPHONE (OUTPATIENT)
Dept: OBGYN CLINIC | Facility: CLINIC | Age: 29
End: 2024-06-24

## 2024-06-26 ENCOUNTER — TELEPHONE (OUTPATIENT)
Dept: PSYCHIATRY | Facility: CLINIC | Age: 29
End: 2024-06-26

## 2024-06-26 NOTE — TELEPHONE ENCOUNTER
Mone Garces called the office and left a voicemail and  requested a call back to discuss the possibility of an earlier NADIRA appt..    They can be reached at P# [phone].       Thank you.

## 2024-06-28 ENCOUNTER — ROUTINE PRENATAL (OUTPATIENT)
Dept: PERINATAL CARE | Facility: CLINIC | Age: 29
End: 2024-06-28
Payer: COMMERCIAL

## 2024-06-28 VITALS
SYSTOLIC BLOOD PRESSURE: 122 MMHG | HEIGHT: 59 IN | BODY MASS INDEX: 37.74 KG/M2 | HEART RATE: 100 BPM | DIASTOLIC BLOOD PRESSURE: 80 MMHG | WEIGHT: 187.2 LBS

## 2024-06-28 DIAGNOSIS — O09.899 SHORT INTERVAL BETWEEN PREGNANCIES AFFECTING PREGNANCY, ANTEPARTUM: ICD-10-CM

## 2024-06-28 DIAGNOSIS — O99.212 OTHER OBESITY DUE TO EXCESS CALORIES AFFECTING PREGNANCY IN SECOND TRIMESTER: Primary | ICD-10-CM

## 2024-06-28 DIAGNOSIS — E66.09 OTHER OBESITY DUE TO EXCESS CALORIES AFFECTING PREGNANCY IN SECOND TRIMESTER: Primary | ICD-10-CM

## 2024-06-28 DIAGNOSIS — Z36.86 ENCOUNTER FOR ANTENATAL SCREENING FOR CERVICAL LENGTH: ICD-10-CM

## 2024-06-28 DIAGNOSIS — O35.8XX0 SUSPECTED DAMAGE TO FETUS FROM DISEASE IN MOTHER, ANTEPARTUM, SINGLE GESTATION: ICD-10-CM

## 2024-06-28 DIAGNOSIS — O36.1910 ANTI-M ISOIMMUNIZATION AFFECTING PREGNANCY IN FIRST TRIMESTER: ICD-10-CM

## 2024-06-28 PROCEDURE — 76817 TRANSVAGINAL US OBSTETRIC: CPT | Performed by: OBSTETRICS & GYNECOLOGY

## 2024-06-28 PROCEDURE — 99213 OFFICE O/P EST LOW 20 MIN: CPT | Performed by: OBSTETRICS & GYNECOLOGY

## 2024-06-28 PROCEDURE — 76811 OB US DETAILED SNGL FETUS: CPT | Performed by: OBSTETRICS & GYNECOLOGY

## 2024-06-28 NOTE — PROGRESS NOTES
Ultrasound Probe Disinfection    A transvaginal ultrasound was performed.   Prior to use, disinfection was performed with High Level Disinfection Process (Plated[employer]).  Probe serial number [id number] was used.      Karine Quiroz  06/28/24  10:27 AM

## 2024-06-28 NOTE — PROGRESS NOTES
"[facility]: Ms. Garces was seen today for anatomic survey and cervical length screening ultrasound.  See ultrasound report under \"OB Procedures\" tab.   The time spent on this established patient on the encounter date included 5 minutes previsit service time reviewing records and precharting, 10 minutes face-to-face service time counseling regarding results and coordinating care, and  5 minutes charting, totalling 20 minutes.  Please don't hesitate to contact our office with any concerns or questions.  -Brittani Cazares MD      " Normal vision: sees adequately in most situations; can see medication labels, newsprint

## 2024-07-09 ENCOUNTER — ROUTINE PRENATAL (OUTPATIENT)
Dept: OBGYN CLINIC | Facility: CLINIC | Age: 29
End: 2024-07-09
Payer: COMMERCIAL

## 2024-07-09 ENCOUNTER — TELEPHONE (OUTPATIENT)
Dept: PSYCHIATRY | Facility: CLINIC | Age: 29
End: 2024-07-09

## 2024-07-09 VITALS
BODY MASS INDEX: 38.38 KG/M2 | HEIGHT: 59 IN | OXYGEN SATURATION: 99 % | WEIGHT: 190.4 LBS | DIASTOLIC BLOOD PRESSURE: 72 MMHG | HEART RATE: 95 BPM | SYSTOLIC BLOOD PRESSURE: 118 MMHG

## 2024-07-09 DIAGNOSIS — R82.71 GBS BACTERIURIA: ICD-10-CM

## 2024-07-09 DIAGNOSIS — O36.1910 ANTI-M ISOIMMUNIZATION AFFECTING PREGNANCY IN FIRST TRIMESTER: ICD-10-CM

## 2024-07-09 DIAGNOSIS — O09.92 ENCOUNTER FOR SUPERVISION OF HIGH RISK PREGNANCY IN SECOND TRIMESTER, ANTEPARTUM: Primary | ICD-10-CM

## 2024-07-09 DIAGNOSIS — O09.299 HX OF PREECLAMPSIA, PRIOR PREGNANCY, CURRENTLY PREGNANT: ICD-10-CM

## 2024-07-09 DIAGNOSIS — O09.899 SHORT INTERVAL BETWEEN PREGNANCIES AFFECTING PREGNANCY, ANTEPARTUM: ICD-10-CM

## 2024-07-09 PROCEDURE — 99213 OFFICE O/P EST LOW 20 MIN: CPT | Performed by: NURSE PRACTITIONER

## 2024-07-09 NOTE — PROGRESS NOTES
28 yo  at 22w gestation.  GBS bacteriuria; obesity; hx of pre-eclampsia; short interval pregnancy; non-immune to varicella; anti-M isoimmunization (needs monthly titers).    24 Lev 2 US: normal anatomy;  she was advised to have her antibody titers done which were ordered early , but has not completed them yet.  Advised her to do so tomorrow.  Repeat US at 28w.    S=D, normal FHTs, normal BP    RV 4w

## 2024-07-09 NOTE — TELEPHONE ENCOUNTER
Mone Garces called the office and  requested a call back to discuss her missed appt and she needs a refill of the Lexapro..    They can be reached at P# [phone].       Thank you.

## 2024-07-10 ENCOUNTER — OFFICE VISIT (OUTPATIENT)
Dept: PSYCHIATRY | Facility: CLINIC | Age: 29
End: 2024-07-10
Payer: COMMERCIAL

## 2024-07-10 VITALS — BODY MASS INDEX: 38.38 KG/M2 | WEIGHT: 190 LBS

## 2024-07-10 DIAGNOSIS — F41.1 GAD (GENERALIZED ANXIETY DISORDER): Primary | ICD-10-CM

## 2024-07-10 DIAGNOSIS — F33.41 RECURRENT MAJOR DEPRESSIVE DISORDER, IN PARTIAL REMISSION (HCC): ICD-10-CM

## 2024-07-10 PROCEDURE — 90792 PSYCH DIAG EVAL W/MED SRVCS: CPT | Performed by: PSYCHIATRY & NEUROLOGY

## 2024-07-10 RX ORDER — ESCITALOPRAM OXALATE 20 MG/1
20 TABLET ORAL DAILY
Qty: 30 TABLET | Refills: 2 | Status: SHIPPED | OUTPATIENT
Start: 2024-07-10 | End: 2024-10-08

## 2024-07-10 NOTE — PSYCH
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 include, but are not  limited to, insufficient sequencing coverage, noise or  artifacts in the region, amplification or sequencing bias,  or insufficient fetal fraction. These tests are not  intended to identify pregnancies at risk for neural tube  defects or ventral wall defects. Testing for whole  chromosome abnormalities (including sex chromosomes) and  for subchromosomal abnormalities could lead to the  potential discovery of both fetal and maternal genomic  abnormalities that could have major, minor, or no, clinical  significance. Evaluating the significance of a positive or  a non-reportable result may involve both invasive testing  and additional studies on the mother. Such investigations  may lead to a diagnosis                            of maternal chromosomal or  subchromosomal abnormalities, which on occasion may be  associated with benign or malignant maternal neoplasms.  These tests may not accurately identify fetal triploidy,  balanced rearrangements, or the precise location of  subchromosomal duplications or deletions; these may be  detected by prenatal  diagnosis with CVS or amniocentesis.  The ability to report results may be impacted by maternal  BMI, maternal weight, maternal systemic lupus erythematosus  (SLE) and/or by certain pharmaceutical agents such as low  molecular weight heparin (for example: Lovenox(R),  Xaparin(R), Clexane(R) and Fragmin(R)).      Note: 05/03/2024 Comment   Final    Comment: See Notes  [employer] is a subsidiary of [employer], using the brand [facility]. This test was  developed and its performance characteristics determined by  [facility]. It has not been cleared or approved by the Food  and Drug Administration. This laboratory is certified under  the Clinical Laboratory Improvement Amendments (CLIA) as  qualified to perform high complexity clinical laboratory  testing and accredited by the College of American  Pathologists (CAP).  If there is future clinical need for adding [facility]  testing, this specimen will be available until term.  [place] samples will not be retained beyond 60 days.  [facility] patients will have to send a new sample for  re-sequencing ([facility] Test Code: 407139).      REFERENCES 05/03/2024 Comment   Final    Comment: 1. Kelin MERCER, et al. Chasity Med. 2012;14(3):296-305.  2. Bobo PENNY, et al. Prenat Diag. 2013;33(6):591-597.  3. Bear CAMPBELL, et al. Clin Chem. 2015 Apr;61(4):608-616.  4. Kelin MERCER, et al. Chasity Med. 2011;13(11):913-920.  5. ACOG/SMFM Practice Bulletin No. 226, Oct 2020.     Appointment on 04/16/2024   Component Date Value Ref Range Status    HIV-1 p24 Antigen 04/16/2024 Non-Reactive  Non-Reactive Final    HIV-1 Antibody 04/16/2024 Non-Reactive  Non-Reactive Final    HIV-2 Antibody 04/16/2024 Non-Reactive  Non-Reactive Final    HIV Ag-Ab 5th Gen 04/16/2024 Non-Reactive  Non-Reactive Final    A Non-Reactive test result does not preclude the possibility of exposure or infection with HIV-1 and/or HIV-2.  Non-Reactive results can occur if the quantity of  "marker present is below the detection limits or is not present during the stage of disease in which a sample is collected. Repeat testing should be considered where there is clinical suspicion of infection.       Hepatitis C Ab 04/16/2024 Non-reactive  Non-Reactive Final    Urine Culture 04/16/2024 <10,000 cfu/ml Beta Hemolytic Streptococcus Group B (A)   Final    This organism is intrinsically susceptible to Penicillin.  If sensitivites to other antibiotics are required, \"Provider/Physician\" please call the Microbiology Department at [phone] within 5 days.    Hepatitis B Surface Ag 04/16/2024 Non-reactive  Non-Reactive Final    WBC 04/16/2024 6.42  4.31 - 10.16 Thousand/uL Final    RBC 04/16/2024 4.39  3.81 - 5.12 Million/uL Final    Hemoglobin 04/16/2024 12.3  11.5 - 15.4 g/dL Final    Hematocrit 04/16/2024 36.9  34.8 - 46.1 % Final    MCV 04/16/2024 84  82 - 98 fL Final    MCH 04/16/2024 28.0  26.8 - 34.3 pg Final    MCHC 04/16/2024 33.3  31.4 - 37.4 g/dL Final    RDW 04/16/2024 12.9  11.6 - 15.1 % Final    MPV 04/16/2024 9.1  8.9 - 12.7 fL Final    Platelets 04/16/2024 321  149 - 390 Thousands/uL Final    nRBC 04/16/2024 0  /100 WBCs Final    Segmented % 04/16/2024 74  43 - 75 % Final    Immature Grans % 04/16/2024 0  0 - 2 % Final    Lymphocytes % 04/16/2024 17  14 - 44 % Final    Monocytes % 04/16/2024 5  4 - 12 % Final    Eosinophils Relative 04/16/2024 3  0 - 6 % Final    Basophils Relative 04/16/2024 1  0 - 1 % Final    Absolute Neutrophils 04/16/2024 4.80  1.85 - 7.62 Thousands/µL Final    Absolute Immature Grans 04/16/2024 0.01  0.00 - 0.20 Thousand/uL Final    Absolute Lymphocytes 04/16/2024 1.09  0.60 - 4.47 Thousands/µL Final    Absolute Monocytes 04/16/2024 0.29  0.17 - 1.22 Thousand/µL Final    Eosinophils Absolute 04/16/2024 0.20  0.00 - 0.61 Thousand/µL Final    Basophils Absolute 04/16/2024 0.03  0.00 - 0.10 Thousands/µL Final    Rubella IgG Quant 04/16/2024 23.4  >14.9 IU/mL Final    ABO " Grouping 04/16/2024 B   Final    Rh Factor 04/16/2024 Positive   Final    Antibody Screen 04/16/2024 Positive   Final    Patient is pregnant with a positive antibody screen. Antibody Identification and Antibody Titer have been ordered.    Specimen Expiration Date 04/16/2024 20240419   Final    Syphilis Total Antibody 04/16/2024 Non-reactive  Non-Reactive Final    No serological evidence of infection with T. pallidum.  Early or incubating syphilis infection cannot be excluded.  Consider repeat testing based on clinical suspicion.    Hep B S Ab 04/16/2024 7.14  3-500 mIU/mL mIU/mL Final    Protective Immunity: Hep B Surface Antibody >= 10 mIu/ml (Traceable to WHO International Reference Preparation)    Glucose 04/16/2024 131  70 - 134 mg/dL Final    <=134 Normal   135-179 Impaired glucose fasting. Perform 3 Hour Glucose Tolerance   >=180 Diagnosis Gestational Diabetes Mellitus    Sodium 04/16/2024 134 (L)  135 - 147 mmol/L Final    Potassium 04/16/2024 3.4 (L)  3.5 - 5.3 mmol/L Final    Chloride 04/16/2024 103  96 - 108 mmol/L Final    CO2 04/16/2024 25  21 - 32 mmol/L Final    ANION GAP 04/16/2024 6  4 - 13 mmol/L Final    BUN 04/16/2024 11  5 - 25 mg/dL Final    Creatinine 04/16/2024 0.47 (L)  0.60 - 1.30 mg/dL Final    Standardized to IDMS reference method    Glucose 04/16/2024 131  65 - 140 mg/dL Final    If the patient is fasting, the ADA then defines impaired fasting glucose as > 100 mg/dL and diabetes as > or equal to 123 mg/dL.    Calcium 04/16/2024 9.6  8.4 - 10.2 mg/dL Final    AST 04/16/2024 14  13 - 39 U/L Final    ALT 04/16/2024 17  7 - 52 U/L Final    Specimen collection should occur prior to Sulfasalazine administration due to the potential for falsely depressed results.     Alkaline Phosphatase 04/16/2024 59  34 - 104 U/L Final    Total Protein 04/16/2024 6.9  6.4 - 8.4 g/dL Final    Albumin 04/16/2024 4.0  3.5 - 5.0 g/dL Final    Total Bilirubin 04/16/2024 0.29  0.20 - 1.00 mg/dL Final    Use of this  assay is not recommended for patients undergoing treatment with eltrombopag due to the potential for falsely elevated results.  N-acetyl-p-benzoquinone imine (metabolite of Acetaminophen) will generate erroneously low results in samples for patients that have taken an overdose of Acetaminophen.    eGFR 04/16/2024 134  ml/min/1.73sq m Final    Creatinine, Ur 04/16/2024 171.0  Reference range not established. mg/dL Final    Protein Urine Random 04/16/2024 23  Reference range not established. mg/dL Final    Prot/Creat Ratio, Ur 04/16/2024 0.13 (H)  0.00 - 0.10 Final    Uric Acid 04/16/2024 4.6  2.0 - 7.5 mg/dL Final    Specimen collection should occur prior to Metamizole administration due to the potential for falsely depressed results.    Varicella IgG 04/16/2024 NON-IMMUNE (A)  IMMUNE Final    Presumed non-immune to VZV IgG infection.    ANTIBODY ID. #1 04/16/2024 Anti-M   Final   Initial Prenatal on 04/15/2024   Component Date Value Ref Range Status    Color, UA 04/16/2024 Light Yellow   Final    Clarity, UA 04/16/2024 Clear   Final    Specific Gravity, UA 04/16/2024 1.030  1.003 - 1.030 Final    pH, UA 04/16/2024 6.0  4.5, 5.0, 5.5, 6.0, 6.5, 7.0, 7.5, 8.0 Final    Leukocytes, UA 04/16/2024 Negative  Negative Final    Nitrite, UA 04/16/2024 Negative  Negative Final    Protein, UA 04/16/2024 30 (1+) (A)  Negative mg/dl Final    Glucose, UA 04/16/2024 Trace (A)  Negative mg/dl Final    Ketones, UA 04/16/2024 Negative  Negative mg/dl Final    Urobilinogen, UA 04/16/2024 <2.0  <2.0 mg/dl mg/dl Final    Bilirubin, UA 04/16/2024 Negative  Negative Final    Occult Blood, UA 04/16/2024 Negative  Negative Final    RBC, UA 04/16/2024 1-2  None Seen, 1-2 /hpf Final    WBC, UA 04/16/2024 2-4 (A)  None Seen, 1-2 /hpf Final    Epithelial Cells 04/16/2024 Occasional  None Seen, Occasional /hpf Final    Bacteria, UA 04/16/2024 None Seen  None Seen, Occasional /hpf Final    MUCUS THREADS 04/16/2024 Innumerable (A)  None Seen Final        Medications Risks/Benefits:      Risks, Benefits And Possible Side Effects Of Medications:    Risks, benefits, and possible side effects of medications explained to Mone and she verbalizes understanding and agreement for treatment.     Controlled Medication Discussion:     Mone has been filling controlled prescriptions on time as prescribed according to [place] Prescription Drug Monitoring Program    Psychotherapy Provided:     Individual psychotherapy provided: No     Treatment Plan:    Completed and signed during the session: To be completed at next session        Orlando Stock MD 07/10/24    This note has been constructed using a voice recognition system. There may be translation, syntax, or grammatical errors. If you have any questions, please contact the dictating provider.

## 2024-07-11 ENCOUNTER — TELEPHONE (OUTPATIENT)
Dept: PSYCHIATRY | Facility: CLINIC | Age: 29
End: 2024-07-11

## 2024-07-22 ENCOUNTER — VBI (OUTPATIENT)
Dept: ADMINISTRATIVE | Facility: OTHER | Age: 29
End: 2024-07-22

## 2024-07-22 NOTE — TELEPHONE ENCOUNTER
07/22/24 12:40 PM     Chart reviewed for Pap Smear (HPV) aka Cervical Cancer Screening was/were submitted to the patient's insurance.     Breanna Tobar   [facility]

## 2024-08-07 ENCOUNTER — NURSE TRIAGE (OUTPATIENT)
Dept: OTHER | Facility: OTHER | Age: 29
End: 2024-08-07

## 2024-08-08 NOTE — TELEPHONE ENCOUNTER
"Regardin weeks pregnant / pelvis pain  ----- Message from Katherin CAMPBELL sent at 2024 11:37 PM EDT -----  \"I'm 26 weeks pregnant and I'm having some pelvis pain, I didn't have this my last pregnancy\"    "

## 2024-08-08 NOTE — TELEPHONE ENCOUNTER
Reached out to on-call provider regarding patient symptoms. Provider recommends rest and to reevaluate in the morning. If no improvement in the morning. Give the office a call to follow up. Patient agreeable.   Reviewed-pt seen yesterday-see note

## 2024-08-08 NOTE — TELEPHONE ENCOUNTER
"Reason for Disposition  • MODERATE-SEVERE abdominal pain (e.g., interferes with normal activities, awakens from sleep)    Answer Assessment - Initial Assessment Questions  1. LOCATION: \"Where does it hurt?\"       Pelvic pain/ possible round ligament pain  2. RADIATION: \"Does the pain shoot anywhere else?\" (e.g., chest, back)      denies  3. ONSET: \"When did the pain begin?\" (Minutes, hours or days ago)       2 days ago, today is strongest pain, worst when getting up to ambulate  4. ONSET: \"Gradual or sudden onset?\"      gradual  5. PATTERN: \"Does the pain come and go, or has it been constant since it started?\"       intermittent  6. SEVERITY: \"How bad is the pain?\" \"What does it keep you from doing?\"  (e.g., Scale 1-10; mild, moderate, or severe)    - MILD (1-3): doesn't interfere with normal activities, abdomen soft and not tender to touch     - MODERATE (4-7): interferes with normal activities or awakens from sleep, tender to touch     - SEVERE (8-10): excruciating pain, doubled over, unable to do any normal activities      6/10  7. RECURRENT SYMPTOM: \"Have you ever had this type of stomach pain before?\" If Yes, ask: \"When was the last time?\" and \"What happened that time?\"       denies  8. CAUSE: \"What do you think is causing the stomach pain?      Unsure possible round ligament pain  9. RELIEVING/AGGRAVATING FACTORS: \"What makes it better or worse?\" (e.g., antacids, bowel movement, movement)      Sits down improves pain  10. FETAL MOVEMENT: \"Has the baby's movement decreased or changed significantly from normal?\"        Same as usual  11. OTHER SYMPTOMS: \"Has there been any vaginal bleeding, fever, vomiting, diarrhea, or urine problems?\"        denies  12. SOUMYA: \"What date are you expecting to deliver?\"        11/12/24    Protocols used: Pregnancy - Abdominal Pain Greater Than 20 Weeks EGA-ADULT-AH    "

## 2024-08-09 ENCOUNTER — HOSPITAL ENCOUNTER (OUTPATIENT)
Facility: HOSPITAL | Age: 29
Discharge: HOME | End: 2024-08-09
Attending: OBSTETRICS & GYNECOLOGY | Admitting: OBSTETRICS & GYNECOLOGY
Payer: COMMERCIAL

## 2024-08-09 VITALS
DIASTOLIC BLOOD PRESSURE: 66 MMHG | RESPIRATION RATE: 18 BRPM | OXYGEN SATURATION: 99 % | SYSTOLIC BLOOD PRESSURE: 118 MMHG | TEMPERATURE: 97.6 F | HEART RATE: 89 BPM

## 2024-08-09 PROBLEM — R10.9 ABDOMINAL PAIN: Status: ACTIVE | Noted: 2024-08-09

## 2024-08-09 PROCEDURE — 76817 TRANSVAGINAL US OBSTETRIC: CPT

## 2024-08-09 PROCEDURE — 99212 OFFICE O/P EST SF 10 MIN: CPT

## 2024-08-09 PROCEDURE — NC001 PR NO CHARGE: Performed by: OBSTETRICS & GYNECOLOGY

## 2024-08-09 NOTE — PROCEDURES
Mone Garces, a  at 26w3d with an SOUMYA of 2024, by Ultrasound, was seen at [facility] for the following procedure(s): $Procedure Type: US - Transvaginal]                   Ultrasound Other  Fetal Presentation: Vertex  Cervical Length: 4.06, 4.19, 4.29  Funnel: No  Placenta Previa: No  Vasa Previa: No               Ultrasound Probe Disinfection    A transvaginal ultrasound was performed.   Prior to use, disinfection was performed with High Level Disinfection Process (Trophon)    Elizabeth Francisco MD  24  2:58 AM

## 2024-08-23 ENCOUNTER — LAB (OUTPATIENT)
Dept: LAB | Facility: CLINIC | Age: 29
End: 2024-08-23
Payer: COMMERCIAL

## 2024-08-23 ENCOUNTER — ULTRASOUND (OUTPATIENT)
Dept: PERINATAL CARE | Facility: CLINIC | Age: 29
End: 2024-08-23
Payer: COMMERCIAL

## 2024-08-23 ENCOUNTER — TELEPHONE (OUTPATIENT)
Dept: OBGYN CLINIC | Facility: CLINIC | Age: 29
End: 2024-08-23

## 2024-08-23 VITALS
HEIGHT: 59 IN | HEART RATE: 104 BPM | SYSTOLIC BLOOD PRESSURE: 120 MMHG | BODY MASS INDEX: 39.23 KG/M2 | DIASTOLIC BLOOD PRESSURE: 72 MMHG | WEIGHT: 194.6 LBS

## 2024-08-23 DIAGNOSIS — O36.1910 ANTI-M ISOIMMUNIZATION AFFECTING PREGNANCY IN FIRST TRIMESTER: ICD-10-CM

## 2024-08-23 DIAGNOSIS — O09.299 HX OF PREECLAMPSIA, PRIOR PREGNANCY, CURRENTLY PREGNANT: ICD-10-CM

## 2024-08-23 DIAGNOSIS — O09.293 HISTORY OF GESTATIONAL DIABETES IN PRIOR PREGNANCY, CURRENTLY PREGNANT IN THIRD TRIMESTER: ICD-10-CM

## 2024-08-23 DIAGNOSIS — Z86.32 HISTORY OF GESTATIONAL DIABETES IN PRIOR PREGNANCY, CURRENTLY PREGNANT IN THIRD TRIMESTER: ICD-10-CM

## 2024-08-23 DIAGNOSIS — O36.1910 ANTI-M ISOIMMUNIZATION AFFECTING PREGNANCY IN FIRST TRIMESTER: Primary | ICD-10-CM

## 2024-08-23 DIAGNOSIS — O99.210 OTHER OBESITY DUE TO EXCESS CALORIES AFFECTING PREGNANCY, ANTEPARTUM: ICD-10-CM

## 2024-08-23 DIAGNOSIS — O09.899 SHORT INTERVAL BETWEEN PREGNANCIES AFFECTING PREGNANCY, ANTEPARTUM: ICD-10-CM

## 2024-08-23 DIAGNOSIS — E66.09 OTHER OBESITY DUE TO EXCESS CALORIES AFFECTING PREGNANCY, ANTEPARTUM: ICD-10-CM

## 2024-08-23 DIAGNOSIS — Z3A.28 28 WEEKS GESTATION OF PREGNANCY: ICD-10-CM

## 2024-08-23 LAB
BASOPHILS # BLD AUTO: 0.02 THOUSANDS/ÂΜL (ref 0–0.1)
BASOPHILS NFR BLD AUTO: 0 % (ref 0–1)
EOSINOPHIL # BLD AUTO: 0.22 THOUSAND/ÂΜL (ref 0–0.61)
EOSINOPHIL NFR BLD AUTO: 2 % (ref 0–6)
ERYTHROCYTE [DISTWIDTH] IN BLOOD BY AUTOMATED COUNT: 13.6 % (ref 11.6–15.1)
HCT VFR BLD AUTO: 32.4 % (ref 34.8–46.1)
HGB BLD-MCNC: 10.6 G/DL (ref 11.5–15.4)
IMM GRANULOCYTES # BLD AUTO: 0.07 THOUSAND/UL (ref 0–0.2)
IMM GRANULOCYTES NFR BLD AUTO: 1 % (ref 0–2)
LYMPHOCYTES # BLD AUTO: 1.48 THOUSANDS/ÂΜL (ref 0.6–4.47)
LYMPHOCYTES NFR BLD AUTO: 16 % (ref 14–44)
MCH RBC QN AUTO: 28.1 PG (ref 26.8–34.3)
MCHC RBC AUTO-ENTMCNC: 32.7 G/DL (ref 31.4–37.4)
MCV RBC AUTO: 86 FL (ref 82–98)
MONOCYTES # BLD AUTO: 0.53 THOUSAND/ÂΜL (ref 0.17–1.22)
MONOCYTES NFR BLD AUTO: 6 % (ref 4–12)
NEUTROPHILS # BLD AUTO: 6.96 THOUSANDS/ÂΜL (ref 1.85–7.62)
NEUTS SEG NFR BLD AUTO: 75 % (ref 43–75)
NRBC BLD AUTO-RTO: 0 /100 WBCS
PLATELET # BLD AUTO: 275 THOUSANDS/UL (ref 149–390)
PMV BLD AUTO: 10.5 FL (ref 8.9–12.7)
RBC # BLD AUTO: 3.77 MILLION/UL (ref 3.81–5.12)
TREPONEMA PALLIDUM IGG+IGM AB [PRESENCE] IN SERUM OR PLASMA BY IMMUNOASSAY: NORMAL
WBC # BLD AUTO: 9.28 THOUSAND/UL (ref 4.31–10.16)

## 2024-08-23 PROCEDURE — 36415 COLL VENOUS BLD VENIPUNCTURE: CPT

## 2024-08-23 PROCEDURE — 76816 OB US FOLLOW-UP PER FETUS: CPT | Performed by: OBSTETRICS & GYNECOLOGY

## 2024-08-23 PROCEDURE — 76821 MIDDLE CEREBRAL ARTERY ECHO: CPT | Performed by: OBSTETRICS & GYNECOLOGY

## 2024-08-23 PROCEDURE — 85025 COMPLETE CBC W/AUTO DIFF WBC: CPT

## 2024-08-23 PROCEDURE — 99214 OFFICE O/P EST MOD 30 MIN: CPT | Performed by: OBSTETRICS & GYNECOLOGY

## 2024-08-23 PROCEDURE — 86780 TREPONEMA PALLIDUM: CPT

## 2024-08-23 NOTE — TELEPHONE ENCOUNTER
"Per Rosie (\"Patient has not been seen by a physician since 16 weeks.   Please change her 28 week visit on 8/26/24 to physician, as she has some high risk factors. \") patient has been rescheduled with physician for 9/3/24. Patient did not accept first available appointments due to not wanting to see specific provider and not being available and said she would rather push the appt date out further.   "

## 2024-08-23 NOTE — RESULT ENCOUNTER NOTE
Mone Garces   Your labs results below returned as low.    You have mild anemia.  If you are taking your prenatal vitamins as instructed on a daily basis then I would recommend that you  ferrous sulfate 325 mgs. Please take on empty stomach or with vit C at bedtime every other day. Avoid taking with milk, calcium, reflux medications like pepcid or omeprazole, thyroid meds.      Fior Leyva MD

## 2024-08-23 NOTE — LETTER
August 23, 2024     Stacey Fournier MD  [address]  [address]  [place] [address]    Patient: Mone Garces   YOB: 1995   Date of Visit: 8/23/2024       Dear Dr. Fournier:    Thank you for referring Mone Garces to me for evaluation. Below are my notes for this consultation.    If you have questions, please do not hesitate to call me. I look forward to following your patient along with you.         Sincerely,        Fior Leyva MD        CC: No Recipients    Fior Leyva MD  8/23/2024 12:46 PM  Sign when Signing Visit  Mone Garces has no complaints today.  She reports regular fetal movements and does not report any problems.  She is here today at 28w3d for an ultrasound for fetal growth.    Problem list:  History of preeclampsia  Short interval pregnancy  Hx of gestational diabetes and preeclampsia in her last pregnancy  Anti-M isoimmunization.  On 4/4/2022 prior to her second pregnancy she had anti-K, anti-M and anti-S identified but were not titered because she was not pregnant.  Then in her last pregnancy in 2023 her antibody screen returned as negative on 2 separate occasions including at delivery.  On 4/16/2024 in this pregnancy only anti-M antibody was found on her initial blood work. Her partner Hari is a carrier for the M antigen and is the father of her other children.  Antibody titers have been ordered since June 2024 but have not been completed yet.    Ultrasound findings:  The ultrasound today shows normal interval fetal growth and fluid.  MCA Dopplers show no evidence for fetal anemia.     Pregnancy ultrasound has limitations and is unable to detect all forms of fetal congenital abnormalities.  The inaccuracy in the EFW can be off by 1 lb either way in the third trimester.    Specific counseling was provided on the following problems:  1.  Encouraged her to get her titers drawn for her M antibody so we know if it is  strong enough to cause fetal anemia in this pregnancy.  If the titer is less than 1-16 then she needs to have her titer redrawn every month till delivery proving that her levels stay low.  If her titer is 1-16 or higher then she needs to have MCA Dopplers every 2 weeks looking for signs or symptoms of fetal anemia.   2.  History of gestational diabetes in the past.  She had a normal early Glucola screen.  Her last OB visit was at 22 weeks and she is not rescheduled now until 9/3/2024.  I ordered her 28-week lab work to be completed with her antibody titer.    Follow up recommended:   Recommend a follow-up ultrasound for growth in 6 weeks.    If her antibody titer returns with a titer of 1-16 or higher then we will also order MCA Dopplers every 2 weeks.    Pre visit time reviewing her records   15 minutes  Face to face time 10 minutes  Post visit time on documentation of note, updating her problem list, adding orders and prescriptions 15 minutes.  Procedures that were completed today were charged separately.   The level of decision making was moderate complexity.    Fior Leyva MD

## 2024-08-23 NOTE — PROGRESS NOTES
Mone Garces has no complaints today.  She reports regular fetal movements and does not report any problems.  She is here today at 28w3d for an ultrasound for fetal growth.    Problem list:  History of preeclampsia  Short interval pregnancy  Hx of gestational diabetes and preeclampsia in her last pregnancy  Anti-M isoimmunization.  On 4/4/2022 prior to her second pregnancy she had anti-K, anti-M and anti-S identified but were not titered because she was not pregnant.  Then in her last pregnancy in 2023 her antibody screen returned as negative on 2 separate occasions including at delivery.  On 4/16/2024 in this pregnancy only anti-M antibody was found on her initial blood work. Her partner Hari is a carrier for the M antigen and is the father of her other children.  Antibody titers have been ordered since June 2024 but have not been completed yet.    Ultrasound findings:  The ultrasound today shows normal interval fetal growth and fluid.  MCA Dopplers show no evidence for fetal anemia.     Pregnancy ultrasound has limitations and is unable to detect all forms of fetal congenital abnormalities.  The inaccuracy in the EFW can be off by 1 lb either way in the third trimester.    Specific counseling was provided on the following problems:  1.  Encouraged her to get her titers drawn for her M antibody so we know if it is strong enough to cause fetal anemia in this pregnancy.  If the titer is less than 1-16 then she needs to have her titer redrawn every month till delivery proving that her levels stay low.  If her titer is 1-16 or higher then she needs to have MCA Dopplers every 2 weeks looking for signs or symptoms of fetal anemia.   2.  History of gestational diabetes in the past.  She had a normal early Glucola screen.  Her last OB visit was at 22 weeks and she is not rescheduled now until 9/3/2024.  I ordered her 28-week lab work to be completed with her antibody titer.    Follow up recommended:    Recommend a follow-up ultrasound for growth in 6 weeks.    If her antibody titer returns with a titer of 1-16 or higher then we will also order MCA Dopplers every 2 weeks.    Pre visit time reviewing her records   15 minutes  Face to face time 10 minutes  Post visit time on documentation of note, updating her problem list, adding orders and prescriptions 15 minutes.  Procedures that were completed today were charged separately.   The level of decision making was moderate complexity.    Fior Leyva MD

## 2024-08-27 ENCOUNTER — APPOINTMENT (OUTPATIENT)
Dept: LAB | Facility: HOSPITAL | Age: 29
End: 2024-08-27
Payer: COMMERCIAL

## 2024-08-27 LAB — GLUCOSE 1H P 50 G GLC PO SERPL-MCNC: 170 MG/DL (ref 70–134)

## 2024-08-27 PROCEDURE — 82950 GLUCOSE TEST: CPT | Performed by: OBSTETRICS & GYNECOLOGY

## 2024-08-27 PROCEDURE — 36415 COLL VENOUS BLD VENIPUNCTURE: CPT | Performed by: OBSTETRICS & GYNECOLOGY

## 2024-08-29 DIAGNOSIS — R73.09 ELEVATED GLUCOSE TOLERANCE TEST: Primary | ICD-10-CM

## 2024-08-29 DIAGNOSIS — O36.1910 ANTI-M ISOIMMUNIZATION AFFECTING PREGNANCY IN FIRST TRIMESTER: Primary | ICD-10-CM

## 2024-09-10 ENCOUNTER — TELEPHONE (OUTPATIENT)
Dept: OBGYN CLINIC | Facility: CLINIC | Age: 29
End: 2024-09-10

## 2024-09-10 NOTE — TELEPHONE ENCOUNTER
Patient returned call.  States she is going into work and not able to do third trimester check in at this time. Did inform patient that she has orders for blood work and has not been seen for routine prenatal care recently.  Scheduled an appt on 9/16/2024 with Dr. Eckert.

## 2024-09-10 NOTE — TELEPHONE ENCOUNTER
Called patient for third trimester assessment.  Patient does not have any appts scheduled in the office.  Left message on voicemail to return call.

## 2024-09-11 ENCOUNTER — HOSPITAL ENCOUNTER (EMERGENCY)
Facility: HOSPITAL | Age: 29
Discharge: HOME | End: 2024-09-11
Attending: EMERGENCY MEDICINE | Admitting: EMERGENCY MEDICINE
Payer: COMMERCIAL

## 2024-09-11 ENCOUNTER — TELEPHONE (OUTPATIENT)
Dept: PSYCHIATRY | Facility: CLINIC | Age: 29
End: 2024-09-11

## 2024-09-11 VITALS
DIASTOLIC BLOOD PRESSURE: 77 MMHG | BODY MASS INDEX: 39.63 KG/M2 | HEART RATE: 135 BPM | WEIGHT: 196.21 LBS | SYSTOLIC BLOOD PRESSURE: 158 MMHG | RESPIRATION RATE: 18 BRPM | TEMPERATURE: 98.3 F | OXYGEN SATURATION: 96 %

## 2024-09-11 DIAGNOSIS — B34.9 VIRAL SYNDROME: Primary | ICD-10-CM

## 2024-09-11 LAB
FLUAV RNA RESP QL NAA+PROBE: NEGATIVE
FLUBV RNA RESP QL NAA+PROBE: NEGATIVE
RSV RNA RESP QL NAA+PROBE: NEGATIVE
S PYO DNA THROAT QL NAA+PROBE: NOT DETECTED
SARS-COV-2 RNA RESP QL NAA+PROBE: NEGATIVE

## 2024-09-11 PROCEDURE — 99284 EMERGENCY DEPT VISIT MOD MDM: CPT

## 2024-09-11 PROCEDURE — 99283 EMERGENCY DEPT VISIT LOW MDM: CPT

## 2024-09-11 PROCEDURE — 0241U HB NFCT DS VIR RESP RNA 4 TRGT: CPT

## 2024-09-11 PROCEDURE — 87651 STREP A DNA AMP PROBE: CPT

## 2024-09-11 NOTE — TELEPHONE ENCOUNTER
Writer called and LVM to call office to make f/u appt.  Transfer call to resident MR to make appt (LS 7/10)

## 2024-09-11 NOTE — ED PROVIDER NOTES
1. Viral syndrome      ED Disposition       ED Disposition   Discharge    Condition   Stable    Date/Time   Wed Sep 11, 2024  8:47 AM    Comment   Mone Garces discharge to home/self care.                   Assessment & Plan       Medical Decision Making  Patient likely has a viral syndrome however given her posterior oropharynx erythema, will swab for strep throat.  Will call if any results are positive.  Discussed supportive measures for at home.    I have discussed the plan to discharge pt from ED. The patient was discharged in stable condition.  Patient ambulated off the department.  Extensive return to emergency department precautions were discussed.  Follow up with appropriate providers including primary care physician was discussed.  Patient and/or their  primary decision maker expressed understanding.  Patient remained stable during entire emergency department stay.      Problems Addressed:  Viral syndrome: acute illness or injury    Amount and/or Complexity of Data Reviewed  Labs: ordered.                     Medications - No data to display    History of Present Illness       Chief Complaint   Patient presents with    Flu Symptoms     Pt began 3 days ago with body aches and sore throat. (+) 31 weeks pregnant         29 YOF approximately 31 weeks pregnant presents today with cough, congestion, body aches and sore throat for about 3 days.  Denies any fevers.  Denies any abdominal pain.  Denies any vaginal bleeding.  States she has no concern with her pregnancy.  She is accompanied by her 2 kids and  who also have the same symptoms.        Review of Systems   Constitutional:  Negative for chills and fever.   HENT:  Positive for congestion and sore throat.    Respiratory:  Positive for cough.    Gastrointestinal:  Negative for abdominal pain, nausea and vomiting.   Genitourinary:  Negative for vaginal bleeding.   Musculoskeletal:  Positive for myalgias.   Neurological:  Negative for  headaches.   All other systems reviewed and are negative.          Objective     ED Triage Vitals [09/11/24 0812]   Temperature Pulse Blood Pressure Respirations SpO2 Patient Position - Orthostatic VS   98.3 °F (36.8 °C) (!) 135 158/77 18 96 % Sitting      Temp Source Heart Rate Source BP Location FiO2 (%) Pain Score    Oral -- Right arm -- 6        Physical Exam  Vitals and nursing note reviewed.   Constitutional:       General: She is not in acute distress.     Appearance: Normal appearance. She is well-developed. She is not ill-appearing.   HENT:      Head: Normocephalic and atraumatic.      Nose: Congestion and rhinorrhea present.      Mouth/Throat:      Pharynx: Posterior oropharyngeal erythema present. No oropharyngeal exudate.   Eyes:      Conjunctiva/sclera: Conjunctivae normal.   Cardiovascular:      Rate and Rhythm: Normal rate.   Pulmonary:      Effort: Pulmonary effort is normal. No respiratory distress.      Breath sounds: Normal breath sounds.   Abdominal:      Palpations: Abdomen is soft.      Tenderness: There is no abdominal tenderness.   Musculoskeletal:         General: No swelling. Normal range of motion.      Cervical back: Normal range of motion and neck supple.   Skin:     General: Skin is warm and dry.      Capillary Refill: Capillary refill takes less than 2 seconds.   Neurological:      Mental Status: She is alert.   Psychiatric:         Mood and Affect: Mood normal.         Labs Reviewed   COVID19, INFLUENZA A/B, RSV PCR, [facility]   STREP A PCR     No orders to display       Procedures       Samantha Byrd PA-C  09/11/24 0912

## 2024-09-11 NOTE — Clinical Note
Mone Garces was seen and treated in our emergency department on 9/11/2024.                Diagnosis:     Mone  may return to work on return date.    She may return on this date: 09/12/2024         If you have any questions or concerns, please don't hesitate to call.      Samantha Byrd PA-C    ______________________________           _______________          _______________  Hospital Representative                              Date                                Time

## 2024-09-16 ENCOUNTER — APPOINTMENT (OUTPATIENT)
Dept: LAB | Facility: CLINIC | Age: 29
End: 2024-09-16
Payer: COMMERCIAL

## 2024-09-16 ENCOUNTER — TELEPHONE (OUTPATIENT)
Dept: PERINATAL CARE | Facility: OTHER | Age: 29
End: 2024-09-16

## 2024-09-16 ENCOUNTER — ROUTINE PRENATAL (OUTPATIENT)
Dept: OBGYN CLINIC | Facility: CLINIC | Age: 29
End: 2024-09-16
Payer: COMMERCIAL

## 2024-09-16 VITALS
WEIGHT: 193.6 LBS | SYSTOLIC BLOOD PRESSURE: 118 MMHG | BODY MASS INDEX: 39.03 KG/M2 | DIASTOLIC BLOOD PRESSURE: 74 MMHG | HEIGHT: 59 IN

## 2024-09-16 DIAGNOSIS — O36.1910 ANTI-M ISOIMMUNIZATION AFFECTING PREGNANCY IN FIRST TRIMESTER: ICD-10-CM

## 2024-09-16 DIAGNOSIS — Z86.32 HISTORY OF GESTATIONAL DIABETES IN PRIOR PREGNANCY, CURRENTLY PREGNANT IN THIRD TRIMESTER: ICD-10-CM

## 2024-09-16 DIAGNOSIS — Z23 NEED FOR TDAP VACCINATION: Primary | ICD-10-CM

## 2024-09-16 DIAGNOSIS — O09.93 ENCOUNTER FOR SUPERVISION OF HIGH RISK PREGNANCY IN THIRD TRIMESTER, ANTEPARTUM: ICD-10-CM

## 2024-09-16 DIAGNOSIS — Z3A.31 31 WEEKS GESTATION OF PREGNANCY: ICD-10-CM

## 2024-09-16 DIAGNOSIS — O09.293 HISTORY OF GESTATIONAL DIABETES IN PRIOR PREGNANCY, CURRENTLY PREGNANT IN THIRD TRIMESTER: ICD-10-CM

## 2024-09-16 DIAGNOSIS — O09.299 HX OF PREECLAMPSIA, PRIOR PREGNANCY, CURRENTLY PREGNANT: ICD-10-CM

## 2024-09-16 PROBLEM — O09.92 ENCOUNTER FOR SUPERVISION OF HIGH RISK PREGNANCY IN SECOND TRIMESTER, ANTEPARTUM: Status: RESOLVED | Noted: 2024-07-09 | Resolved: 2024-09-16

## 2024-09-16 LAB
ABO GROUP BLD: NORMAL
BLD GP AB SCN SERPL QL: NEGATIVE
ERYTHROCYTE [DISTWIDTH] IN BLOOD BY AUTOMATED COUNT: 13.7 % (ref 11.6–15.1)
HCT VFR BLD AUTO: 33.6 % (ref 34.8–46.1)
HGB BLD-MCNC: 10.9 G/DL (ref 11.5–15.4)
MCH RBC QN AUTO: 27.7 PG (ref 26.8–34.3)
MCHC RBC AUTO-ENTMCNC: 32.4 G/DL (ref 31.4–37.4)
MCV RBC AUTO: 85 FL (ref 82–98)
PLATELET # BLD AUTO: 294 THOUSANDS/UL (ref 149–390)
PMV BLD AUTO: 10.8 FL (ref 8.9–12.7)
RBC # BLD AUTO: 3.94 MILLION/UL (ref 3.81–5.12)
RH BLD: POSITIVE
SPECIMEN EXPIRATION DATE: NORMAL
WBC # BLD AUTO: 11.33 THOUSAND/UL (ref 4.31–10.16)

## 2024-09-16 PROCEDURE — 86900 BLOOD TYPING SEROLOGIC ABO: CPT

## 2024-09-16 PROCEDURE — 86901 BLOOD TYPING SEROLOGIC RH(D): CPT

## 2024-09-16 PROCEDURE — 99213 OFFICE O/P EST LOW 20 MIN: CPT | Performed by: OBSTETRICS & GYNECOLOGY

## 2024-09-16 PROCEDURE — 36415 COLL VENOUS BLD VENIPUNCTURE: CPT

## 2024-09-16 PROCEDURE — 86850 RBC ANTIBODY SCREEN: CPT

## 2024-09-16 PROCEDURE — 90715 TDAP VACCINE 7 YRS/> IM: CPT | Performed by: OBSTETRICS & GYNECOLOGY

## 2024-09-16 PROCEDURE — 90471 IMMUNIZATION ADMIN: CPT | Performed by: OBSTETRICS & GYNECOLOGY

## 2024-09-16 PROCEDURE — 85027 COMPLETE CBC AUTOMATED: CPT

## 2024-09-16 NOTE — TELEPHONE ENCOUNTER
Call pt and LVM to schedule Dr Leyva wants pt return around 10/4/2024) for Growth , Requesting pt to call our office back at [phone]

## 2024-09-16 NOTE — PROGRESS NOTES
Assessment & Plan  29 y.o.  at 31w6d presenting for routine prenatal visit.     Problem List       Obesity (BMI 30-39.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 of cervix    Overview     Repeat pap smear [ ]         Migraine without aura and without status migrainosus, not intractable    Chronic midline low back pain without sciatica    Chronic fatigue    Dry eyes    Vitamin D deficiency    Nephrocalcinosis    Anxiety    Myofascial pain    Bronchitis    Positive GBS test    Overview     Needs PCN in labor         31 weeks gestation of pregnancy    Overview     LDASA   MSAFP wnl  Delivery consent [x]  Tdap [x]  Needs to complete 3 hour GTT; patient would prefer to do fingersticks [ ]  GBS bacteruria          Short interval between pregnancies affecting pregnancy, antepartum    Hx of preeclampsia, prior pregnancy, currently pregnant    Overview     LDASA          GBS bacteriuria    Overview     PCN during labor         Susceptible to varicella (non-immune), currently pregnant    Overview     Varivax postpartum         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  On 2022 prior to her second pregnancy she had anti-K, anti-M and anti-S identified but were not titered because she was not pregnant.  Then in her last pregnancy in  her antibody screen returned as negative on 2 separate occasions including at delivery.  On 2024 in this pregnancy only anti-M antibody was found on her initial blood work. Her partner Hari is a carrier for the M antigen and is the father of her other children.  Antibody titers have been ordered since 2024 but have not been completed yet.           Abdominal pain    History of gestational diabetes in prior pregnancy, currently  "pregnant in third trimester     Other Visit Diagnoses       Need for Tdap vaccination    -  Primary    Encounter for supervision of high risk pregnancy in third trimester, antepartum              ____________________________________________________________  Subjective  She is without complaint.   She denies contractions, loss of fluid, or vaginal bleeding.   She feels regular fetal movements.     Objective  /74 (BP Location: Left arm, Patient Position: Sitting, Cuff Size: Standard)   Ht 4' 11\" (1.499 m)   Wt 87.8 kg (193 lb 9.6 oz)   LMP 01/10/2024 (Approximate)   BMI 39.10 kg/m²   FHR: 135 bpm  Fundal height 33 cm    Physical Exam  Constitutional:       Appearance: She is well-developed.   Cardiovascular:      Rate and Rhythm: Normal rate.   Pulmonary:      Effort: Pulmonary effort is normal. No respiratory distress.   Abdominal:      Palpations: Abdomen is soft.      Tenderness: There is no abdominal tenderness.   Skin:     General: Skin is warm and dry.          Patient's Active Problem List  Patient Active Problem List   Diagnosis    Obesity (BMI 30-39.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 of cervix    Migraine without aura and without status migrainosus, not intractable    Chronic midline low back pain without sciatica    Chronic fatigue    Dry eyes    Vitamin D deficiency    Nephrocalcinosis    Anxiety    Myofascial pain    Bronchitis    Positive GBS test    31 weeks gestation of pregnancy    Short interval between pregnancies affecting pregnancy, antepartum    Hx of preeclampsia, prior pregnancy, currently pregnant    GBS bacteriuria    Susceptible to varicella (non-immune), currently pregnant    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

## 2024-09-17 ENCOUNTER — TELEPHONE (OUTPATIENT)
Age: 29
End: 2024-09-17

## 2024-09-17 DIAGNOSIS — O99.013 ANEMIA DURING PREGNANCY IN THIRD TRIMESTER: Primary | ICD-10-CM

## 2024-09-17 RX ORDER — FERROUS SULFATE 324(65)MG
324 TABLET, DELAYED RELEASE (ENTERIC COATED) ORAL
Qty: 30 TABLET | Refills: 1 | Status: SHIPPED | OUTPATIENT
Start: 2024-09-17

## 2024-09-17 NOTE — TELEPHONE ENCOUNTER
----- Message from Olena Eckert MD sent at 9/17/2024  2:32 PM EDT -----  Please inform patient that her antibody screen was negative - we will repeat a type and screen in 4 weeks. Her hemoglobin is also a little low, I ordered an oral iron supplement to her pharmacy.

## 2024-09-19 ENCOUNTER — NURSE TRIAGE (OUTPATIENT)
Age: 29
End: 2024-09-19

## 2024-09-19 ENCOUNTER — PATIENT MESSAGE (OUTPATIENT)
Dept: OBGYN CLINIC | Facility: CLINIC | Age: 29
End: 2024-09-19

## 2024-09-19 DIAGNOSIS — O21.9 NAUSEA AND VOMITING IN PREGNANCY: Primary | ICD-10-CM

## 2024-09-19 RX ORDER — FAMOTIDINE 20 MG/1
20 TABLET, FILM COATED ORAL DAILY
Qty: 30 TABLET | Refills: 1 | Status: SHIPPED | OUTPATIENT
Start: 2024-09-19

## 2024-09-19 RX ORDER — METOCLOPRAMIDE 10 MG/1
10 TABLET ORAL 4 TIMES DAILY
Qty: 30 TABLET | Refills: 1 | Status: SHIPPED | OUTPATIENT
Start: 2024-09-19

## 2024-09-19 RX ORDER — PYRIDOXINE HCL (VITAMIN B6) 25 MG
25 TABLET ORAL DAILY
Qty: 30 TABLET | Refills: 1 | Status: SHIPPED | OUTPATIENT
Start: 2024-09-19

## 2024-09-19 NOTE — PATIENT COMMUNICATION
Outgoing call to patient. States that she has been having N/V in her 3rd trimester. States that between last 2 prenatal appointments that she lost 5lbs. States that she discussed this with Dr. Eckert at her last visit.     Patient is continuing to have vomiting. States it is mostly at night and vomits about 3 times a day. Is able to keep fluids down. Denies vaginal bleeding, LOF, contractions. Endorses fetal movement. Denies vision changes, swelling of face or hands, URQ pain. States that she does have occasional headaches but this is not new symptom as she suffers from migraines.     Routing to provider for review.

## 2024-09-19 NOTE — TELEPHONE ENCOUNTER
"Regarding: nausea 32w  ----- Message from Grace VALENZUELA sent at 9/19/2024  2:45 PM EDT -----  Pt sent a [employer] message requesting zofran. Said she was feeling real nauseous. I asked patient if she was having trouble keeping food or liquids down, feeling dizzy, lightheaded or urinating less and she just said \"lots of vomiting\".    "

## 2024-10-03 ENCOUNTER — ULTRASOUND (OUTPATIENT)
Age: 29
End: 2024-10-03
Payer: COMMERCIAL

## 2024-10-03 ENCOUNTER — PATIENT MESSAGE (OUTPATIENT)
Dept: OBGYN CLINIC | Facility: CLINIC | Age: 29
End: 2024-10-03

## 2024-10-03 VITALS
BODY MASS INDEX: 40.12 KG/M2 | DIASTOLIC BLOOD PRESSURE: 74 MMHG | SYSTOLIC BLOOD PRESSURE: 116 MMHG | HEIGHT: 59 IN | WEIGHT: 199 LBS | HEART RATE: 85 BPM

## 2024-10-03 DIAGNOSIS — Z3A.34 34 WEEKS GESTATION OF PREGNANCY: Primary | ICD-10-CM

## 2024-10-03 DIAGNOSIS — Z86.32 HISTORY OF GESTATIONAL DIABETES IN PRIOR PREGNANCY, CURRENTLY PREGNANT IN THIRD TRIMESTER: ICD-10-CM

## 2024-10-03 DIAGNOSIS — R73.09 ELEVATED GLUCOSE TOLERANCE TEST: ICD-10-CM

## 2024-10-03 DIAGNOSIS — E66.9 OBESITY (BMI 30-39.9): ICD-10-CM

## 2024-10-03 DIAGNOSIS — O09.293 HISTORY OF GESTATIONAL DIABETES IN PRIOR PREGNANCY, CURRENTLY PREGNANT IN THIRD TRIMESTER: ICD-10-CM

## 2024-10-03 DIAGNOSIS — O36.1910 ANTI-M ISOIMMUNIZATION AFFECTING PREGNANCY IN FIRST TRIMESTER: ICD-10-CM

## 2024-10-03 PROCEDURE — 99214 OFFICE O/P EST MOD 30 MIN: CPT | Performed by: OBSTETRICS & GYNECOLOGY

## 2024-10-03 PROCEDURE — 76821 MIDDLE CEREBRAL ARTERY ECHO: CPT | Performed by: OBSTETRICS & GYNECOLOGY

## 2024-10-03 PROCEDURE — 76816 OB US FOLLOW-UP PER FETUS: CPT | Performed by: OBSTETRICS & GYNECOLOGY

## 2024-10-03 NOTE — PROGRESS NOTES
A fetal ultrasound was completed. See Ob procedures in Epic for an interpretation and recommendations. Do not hesitate to contact us in [facility] if you have questions.    Manjeet Dixon MD, MSCE  Maternal Fetal Medicine

## 2024-10-03 NOTE — LETTER
October 3, 2024     Stacey Fournier MD  [address]  [address]  [place] [address]    Patient: Mone Garces   YOB: 1995   Date of Visit: 10/3/2024       Dear Dr. Fournier:    Thank you for referring Mone Garces to me for evaluation. Below are my notes for this consultation.    If you have questions, please do not hesitate to call me. I look forward to following your patient along with you.         Sincerely,        Manjeet Dixon MD        CC: No Recipients    Manjeet Dixon MD  10/3/2024 12:09 PM  Sign when Signing Visit  A fetal ultrasound was completed. See Ob procedures in Epic for an interpretation and recommendations. Do not hesitate to contact us in [facility] if you have questions.    Manjeet Dixon MD, MSCE  Maternal Fetal Medicine

## 2024-10-06 DIAGNOSIS — F41.1 GAD (GENERALIZED ANXIETY DISORDER): ICD-10-CM

## 2024-10-06 DIAGNOSIS — F33.41 RECURRENT MAJOR DEPRESSIVE DISORDER, IN PARTIAL REMISSION (HCC): ICD-10-CM

## 2024-10-06 PROCEDURE — NC001 PR NO CHARGE

## 2024-10-07 ENCOUNTER — TELEPHONE (OUTPATIENT)
Age: 29
End: 2024-10-07

## 2024-10-07 PROBLEM — O36.1930: Status: ACTIVE | Noted: 2024-04-26

## 2024-10-07 RX ORDER — ESCITALOPRAM OXALATE 20 MG/1
20 TABLET ORAL DAILY
Qty: 30 TABLET | Refills: 2 | Status: SHIPPED | OUTPATIENT
Start: 2024-10-07

## 2024-10-11 ENCOUNTER — ROUTINE PRENATAL (OUTPATIENT)
Dept: OBGYN CLINIC | Facility: CLINIC | Age: 29
End: 2024-10-11
Payer: COMMERCIAL

## 2024-10-11 VITALS
HEART RATE: 108 BPM | BODY MASS INDEX: 39.92 KG/M2 | HEIGHT: 59 IN | WEIGHT: 198 LBS | SYSTOLIC BLOOD PRESSURE: 120 MMHG | OXYGEN SATURATION: 100 % | DIASTOLIC BLOOD PRESSURE: 64 MMHG

## 2024-10-11 DIAGNOSIS — O09.293 HISTORY OF GESTATIONAL DIABETES IN PRIOR PREGNANCY, CURRENTLY PREGNANT IN THIRD TRIMESTER: ICD-10-CM

## 2024-10-11 DIAGNOSIS — O09.299 HX OF PREECLAMPSIA, PRIOR PREGNANCY, CURRENTLY PREGNANT: ICD-10-CM

## 2024-10-11 DIAGNOSIS — Z86.32 HISTORY OF GESTATIONAL DIABETES IN PRIOR PREGNANCY, CURRENTLY PREGNANT IN THIRD TRIMESTER: ICD-10-CM

## 2024-10-11 DIAGNOSIS — Z3A.35 35 WEEKS GESTATION OF PREGNANCY: ICD-10-CM

## 2024-10-11 DIAGNOSIS — O09.899 SHORT INTERVAL BETWEEN PREGNANCIES AFFECTING PREGNANCY, ANTEPARTUM: Primary | ICD-10-CM

## 2024-10-11 PROCEDURE — 99213 OFFICE O/P EST LOW 20 MIN: CPT | Performed by: OBSTETRICS & GYNECOLOGY

## 2024-10-11 NOTE — PROGRESS NOTES
Assessment & Plan  29 y.o.  at 35w3d presenting for routine prenatal visit.       Problem List       Obesity (BMI 30-39.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 of cervix    Overview     Repeat pap smear [ ]         Migraine without aura and without status migrainosus, not intractable    Chronic midline low back pain without sciatica    Chronic fatigue    Dry eyes    Vitamin D deficiency    Nephrocalcinosis    Anxiety    Myofascial pain    Bronchitis    Positive GBS test    Overview     Needs PCN in labor         35 weeks gestation of pregnancy    Overview     LDASA   MSAFP wnl  Delivery consent [x]  Tdap [x]  Needs to complete 3 hour GTT  Would like to have a Nexplanon         Short interval between pregnancies affecting pregnancy, antepartum    Hx of preeclampsia, prior pregnancy, currently pregnant    Overview     LDASA          GBS bacteriuria    Overview     PCN during labor         Susceptible to varicella (non-immune), currently pregnant    Overview     Varivax postpartum         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  On 2022 prior to her second pregnancy she had anti-K, anti-M and anti-S identified but were not titered because she was not pregnant.  Then in her last pregnancy in  her antibody screen returned as negative on 2 separate occasions including at delivery.  On 2024 in this pregnancy only anti-M antibody was found on her initial blood work. Her partner Hari is a carrier for the M antigen and is the father of her other children.  Antibody titers have been ordered since 2024 but have not been completed yet.           Abdominal pain    History of gestational diabetes in prior pregnancy, currently pregnant in third trimester     "Elevated glucose tolerance test     ____________________________________________________________  Subjective  She is without complaint.   She denies contractions, loss of fluid, or vaginal bleeding.   She feels regular fetal movements.       Objective  /64 (BP Location: Right arm, Patient Position: Sitting, Cuff Size: Standard)   Pulse (!) 108   Ht 4' 11\" (1.499 m)   Wt 89.8 kg (198 lb)   LMP 01/10/2024 (Approximate)   SpO2 100%   BMI 39.99 kg/m²   FHR: 140's via doppler  FH: 37cm    Physical Exam  Constitutional:       Appearance: She is well-developed.   Cardiovascular:      Rate and Rhythm: Normal rate and regular rhythm.      Heart sounds: Normal heart sounds. No murmur heard.     No friction rub. No gallop.   Pulmonary:      Effort: Pulmonary effort is normal. No respiratory distress.      Breath sounds: No wheezing.   Abdominal:      Palpations: Abdomen is soft.      Tenderness: There is no abdominal tenderness.   Musculoskeletal:         General: No tenderness.   Neurological:      Mental Status: She is alert and oriented to person, place, and time.   Vitals reviewed.          Patient's Active Problem List  Patient Active Problem List   Diagnosis    Obesity (BMI 30-39.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 of cervix    Migraine without aura and without status migrainosus, not intractable    Chronic midline low back pain without sciatica    Chronic fatigue    Dry eyes    Vitamin D deficiency    Nephrocalcinosis    Anxiety    Myofascial pain    Bronchitis    Positive GBS test    35 weeks gestation of pregnancy    Short interval between pregnancies affecting pregnancy, antepartum    Hx of preeclampsia, prior pregnancy, currently pregnant    GBS bacteriuria    Susceptible to varicella (non-immune), currently pregnant    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

## 2024-10-15 DIAGNOSIS — O21.9 NAUSEA AND VOMITING IN PREGNANCY: ICD-10-CM

## 2024-10-16 ENCOUNTER — TELEPHONE (OUTPATIENT)
Age: 29
End: 2024-10-16

## 2024-10-16 RX ORDER — METOCLOPRAMIDE 10 MG/1
10 TABLET ORAL 4 TIMES DAILY
Qty: 30 TABLET | Refills: 5 | Status: SHIPPED | OUTPATIENT
Start: 2024-10-16

## 2024-10-16 NOTE — TELEPHONE ENCOUNTER
Patient called to schedule the remainder of her ob visits. Patient canceled her OB for today 10/16/2024 36w 1d, offered appts tomorrow with  , patient unable to make appts. Patients states she is okay to wait until next week,since she is unable to make appts tomorrow 10/17/24. If you would like patient to be seen this week, please call patient.    Patient is scheduled for 10/21 36w 6d, 10/29 38w,  11/5 39w and 11/11 40w. SOUMYA 11/12/2024

## 2024-10-16 NOTE — TELEPHONE ENCOUNTER
Pt was last seen 10/11 - nothing sooner available and pt refused appt for tomorrow. Pt has appts set until delivery so pt is set.

## 2024-10-30 ENCOUNTER — TELEPHONE (OUTPATIENT)
Age: 29
End: 2024-10-30

## 2024-10-30 NOTE — TELEPHONE ENCOUNTER
Called pt, offered her options for this week. Pt declined due to work schedule.  Advised pt to call back again tomorrow to see if there are any other openings. Otherwise pt has appt on 11/5.

## 2024-10-30 NOTE — TELEPHONE ENCOUNTER
Pt called to R/S her 38 wk OB visit that she missed on 10/29. No available appts this week with a MD at either [place] or [place] location. Please provide a call back to assist with R/S. Pt communicated no symptoms of concern at the moment, just some mild pelvic pressure.

## 2024-10-31 ENCOUNTER — HOSPITAL ENCOUNTER (INPATIENT)
Facility: HOSPITAL | Age: 29
LOS: 3 days | Discharge: HOME | DRG: 540 | End: 2024-11-03
Attending: OBSTETRICS & GYNECOLOGY | Admitting: OBSTETRICS & GYNECOLOGY
Payer: COMMERCIAL

## 2024-10-31 ENCOUNTER — ULTRASOUND (OUTPATIENT)
Age: 29
DRG: 540 | End: 2024-10-31
Payer: COMMERCIAL

## 2024-10-31 ENCOUNTER — ANESTHESIA EVENT (INPATIENT)
Dept: LABOR AND DELIVERY | Facility: HOSPITAL | Age: 29
DRG: 540 | End: 2024-10-31
Payer: COMMERCIAL

## 2024-10-31 VITALS
WEIGHT: 201.4 LBS | SYSTOLIC BLOOD PRESSURE: 142 MMHG | HEART RATE: 88 BPM | DIASTOLIC BLOOD PRESSURE: 102 MMHG | BODY MASS INDEX: 40.6 KG/M2 | HEIGHT: 59 IN

## 2024-10-31 DIAGNOSIS — O36.63X0 MACROSOMIA OF FETUS AFFECTING MANAGEMENT OF MOTHER IN THIRD TRIMESTER, SINGLE OR UNSPECIFIED FETUS: ICD-10-CM

## 2024-10-31 DIAGNOSIS — E66.812 CLASS 2 OBESITY DUE TO EXCESS CALORIES WITHOUT SERIOUS COMORBIDITY WITH BODY MASS INDEX (BMI) OF 37.0 TO 37.9 IN ADULT: ICD-10-CM

## 2024-10-31 DIAGNOSIS — O13.3 GESTATIONAL HYPERTENSION, THIRD TRIMESTER: Primary | ICD-10-CM

## 2024-10-31 DIAGNOSIS — Z3A.38 38 WEEKS GESTATION OF PREGNANCY: ICD-10-CM

## 2024-10-31 DIAGNOSIS — Z30.017 ENCOUNTER FOR INITIAL PRESCRIPTION OF ETONOGESTREL CONTRACEPTIVE SINGLE-ROD SUBDERMAL CONTRACEPTIVE IMPLANT: ICD-10-CM

## 2024-10-31 DIAGNOSIS — R73.09 ELEVATED GLUCOSE TOLERANCE TEST: ICD-10-CM

## 2024-10-31 DIAGNOSIS — Z98.891 S/P CESAREAN SECTION: ICD-10-CM

## 2024-10-31 DIAGNOSIS — Z91.199 NONCOMPLIANCE WITH DIABETES TREATMENT: ICD-10-CM

## 2024-10-31 DIAGNOSIS — O99.213 OTHER OBESITY DUE TO EXCESS CALORIES AFFECTING PREGNANCY IN THIRD TRIMESTER: ICD-10-CM

## 2024-10-31 DIAGNOSIS — O36.1930 ANTI-M ISOIMMUNIZATION AFFECTING PREGNANCY IN THIRD TRIMESTER: ICD-10-CM

## 2024-10-31 DIAGNOSIS — E66.09 CLASS 2 OBESITY DUE TO EXCESS CALORIES WITHOUT SERIOUS COMORBIDITY WITH BODY MASS INDEX (BMI) OF 37.0 TO 37.9 IN ADULT: ICD-10-CM

## 2024-10-31 DIAGNOSIS — O35.8XX0 PREGNANCY COMPLICATED BY FETAL ABDOMINAL ABNORMALITY, SINGLE OR UNSPECIFIED FETUS: ICD-10-CM

## 2024-10-31 DIAGNOSIS — O14.93 PRE-ECLAMPSIA IN THIRD TRIMESTER: ICD-10-CM

## 2024-10-31 DIAGNOSIS — E66.09 OTHER OBESITY DUE TO EXCESS CALORIES AFFECTING PREGNANCY IN THIRD TRIMESTER: ICD-10-CM

## 2024-10-31 DIAGNOSIS — O09.33 INSUFFICIENT PRENATAL CARE IN THIRD TRIMESTER: ICD-10-CM

## 2024-10-31 DIAGNOSIS — Z3A.38 38 WEEKS GESTATION OF PREGNANCY: Primary | ICD-10-CM

## 2024-10-31 PROBLEM — O99.210 OBESITY COMPLICATING PREGNANCY: Status: ACTIVE | Noted: 2024-10-31

## 2024-10-31 PROBLEM — O24.419 GESTATIONAL DIABETES: Status: ACTIVE | Noted: 2023-10-06

## 2024-10-31 PROBLEM — R03.0 ELEVATED BP WITHOUT DIAGNOSIS OF HYPERTENSION: Status: ACTIVE | Noted: 2024-10-31

## 2024-10-31 PROBLEM — D64.9 ANEMIA: Status: ACTIVE | Noted: 2024-10-31

## 2024-10-31 PROBLEM — O13.9 GESTATIONAL HYPERTENSION: Status: ACTIVE | Noted: 2024-10-31

## 2024-10-31 LAB
ABO GROUP BLD: NORMAL
ALBUMIN SERPL BCG-MCNC: 3.2 G/DL (ref 3.5–5)
ALP SERPL-CCNC: 134 U/L (ref 34–104)
ALT SERPL W P-5'-P-CCNC: 8 U/L (ref 7–52)
ANION GAP SERPL CALCULATED.3IONS-SCNC: 7 MMOL/L (ref 4–13)
ANTIBODY ID. #3: NORMAL
AST SERPL W P-5'-P-CCNC: 13 U/L (ref 13–39)
BILIRUB SERPL-MCNC: 0.25 MG/DL (ref 0.2–1)
BLD GP AB SCN SERPL QL: POSITIVE
BLOOD GROUP ANTIBODIES SERPL: NORMAL
BLOOD GROUP ANTIBODIES SERPL: NORMAL
BUN SERPL-MCNC: 11 MG/DL (ref 5–25)
CALCIUM ALBUM COR SERPL-MCNC: 10.1 MG/DL (ref 8.3–10.1)
CALCIUM SERPL-MCNC: 9.5 MG/DL (ref 8.4–10.2)
CHLORIDE SERPL-SCNC: 107 MMOL/L (ref 96–108)
CO2 SERPL-SCNC: 22 MMOL/L (ref 21–32)
CREAT SERPL-MCNC: 0.65 MG/DL (ref 0.6–1.3)
CREAT UR-MCNC: 166.9 MG/DL
ERYTHROCYTE [DISTWIDTH] IN BLOOD BY AUTOMATED COUNT: 14.1 % (ref 11.6–15.1)
EST. AVERAGE GLUCOSE BLD GHB EST-MCNC: 123 MG/DL
GFR SERPL CREATININE-BSD FRML MDRD: 121 ML/MIN/1.73SQ M
GLUCOSE SERPL-MCNC: 114 MG/DL (ref 65–140)
GLUCOSE SERPL-MCNC: 115 MG/DL (ref 65–140)
GLUCOSE SERPL-MCNC: 74 MG/DL (ref 65–140)
GLUCOSE SERPL-MCNC: 79 MG/DL (ref 65–140)
GLUCOSE SERPL-MCNC: 84 MG/DL (ref 65–140)
GLUCOSE SERPL-MCNC: 89 MG/DL (ref 65–140)
HBA1C MFR BLD: 5.9 %
HCT VFR BLD AUTO: 31.1 % (ref 34.8–46.1)
HGB BLD-MCNC: 10.1 G/DL (ref 11.5–15.4)
MCH RBC QN AUTO: 26.6 PG (ref 26.8–34.3)
MCHC RBC AUTO-ENTMCNC: 32.5 G/DL (ref 31.4–37.4)
MCV RBC AUTO: 82 FL (ref 82–98)
PLATELET # BLD AUTO: 242 THOUSANDS/UL (ref 149–390)
PMV BLD AUTO: 11.2 FL (ref 8.9–12.7)
POTASSIUM SERPL-SCNC: 3.8 MMOL/L (ref 3.5–5.3)
PROT SERPL-MCNC: 6.4 G/DL (ref 6.4–8.4)
PROT UR-MCNC: 65 MG/DL
PROT/CREAT UR: 0.4 MG/G{CREAT} (ref 0–0.1)
RBC # BLD AUTO: 3.8 MILLION/UL (ref 3.81–5.12)
RH BLD: POSITIVE
SODIUM SERPL-SCNC: 136 MMOL/L (ref 135–147)
SPECIMEN EXPIRATION DATE: NORMAL
WBC # BLD AUTO: 10.09 THOUSAND/UL (ref 4.31–10.16)

## 2024-10-31 PROCEDURE — 4A1HXCZ MONITORING OF PRODUCTS OF CONCEPTION, CARDIAC RATE, EXTERNAL APPROACH: ICD-10-PCS | Performed by: OBSTETRICS & GYNECOLOGY

## 2024-10-31 PROCEDURE — 10H073Z INSERTION OF MONITORING ELECTRODE INTO PRODUCTS OF CONCEPTION, VIA NATURAL OR ARTIFICIAL OPENING: ICD-10-PCS | Performed by: OBSTETRICS & GYNECOLOGY

## 2024-10-31 PROCEDURE — 76816 OB US FOLLOW-UP PER FETUS: CPT | Performed by: OBSTETRICS & GYNECOLOGY

## 2024-10-31 PROCEDURE — 99214 OFFICE O/P EST MOD 30 MIN: CPT | Performed by: OBSTETRICS & GYNECOLOGY

## 2024-10-31 PROCEDURE — NC001 PR NO CHARGE: Performed by: OBSTETRICS & GYNECOLOGY

## 2024-10-31 PROCEDURE — 86900 BLOOD TYPING SEROLOGIC ABO: CPT

## 2024-10-31 PROCEDURE — 59025 FETAL NON-STRESS TEST: CPT | Performed by: OBSTETRICS & GYNECOLOGY

## 2024-10-31 PROCEDURE — 83036 HEMOGLOBIN GLYCOSYLATED A1C: CPT

## 2024-10-31 PROCEDURE — 86870 RBC ANTIBODY IDENTIFICATION: CPT

## 2024-10-31 PROCEDURE — 4A1H7CZ MONITORING OF PRODUCTS OF CONCEPTION, CARDIAC RATE, VIA NATURAL OR ARTIFICIAL OPENING: ICD-10-PCS | Performed by: OBSTETRICS & GYNECOLOGY

## 2024-10-31 PROCEDURE — 86901 BLOOD TYPING SEROLOGIC RH(D): CPT

## 2024-10-31 PROCEDURE — 3E033VJ INTRODUCTION OF OTHER HORMONE INTO PERIPHERAL VEIN, PERCUTANEOUS APPROACH: ICD-10-PCS | Performed by: OBSTETRICS & GYNECOLOGY

## 2024-10-31 PROCEDURE — 82948 REAGENT STRIP/BLOOD GLUCOSE: CPT

## 2024-10-31 PROCEDURE — 85027 COMPLETE CBC AUTOMATED: CPT

## 2024-10-31 PROCEDURE — 80053 COMPREHEN METABOLIC PANEL: CPT

## 2024-10-31 PROCEDURE — 84156 ASSAY OF PROTEIN URINE: CPT

## 2024-10-31 PROCEDURE — 10907ZC DRAINAGE OF AMNIOTIC FLUID, THERAPEUTIC FROM PRODUCTS OF CONCEPTION, VIA NATURAL OR ARTIFICIAL OPENING: ICD-10-PCS | Performed by: OBSTETRICS & GYNECOLOGY

## 2024-10-31 PROCEDURE — 86850 RBC ANTIBODY SCREEN: CPT

## 2024-10-31 PROCEDURE — 82570 ASSAY OF URINE CREATININE: CPT

## 2024-10-31 PROCEDURE — 86780 TREPONEMA PALLIDUM: CPT

## 2024-10-31 RX ORDER — OXYTOCIN/RINGER'S LACTATE 30/500 ML
1-30 PLASTIC BAG, INJECTION (ML) INTRAVENOUS
Status: DISCONTINUED | OUTPATIENT
Start: 2024-10-31 | End: 2024-11-01

## 2024-10-31 RX ORDER — ROPIVACAINE HYDROCHLORIDE 2 MG/ML
INJECTION, SOLUTION EPIDURAL; INFILTRATION; PERINEURAL AS NEEDED
Status: DISCONTINUED | OUTPATIENT
Start: 2024-10-31 | End: 2024-11-01

## 2024-10-31 RX ORDER — LIDOCAINE HYDROCHLORIDE AND EPINEPHRINE 15; 5 MG/ML; UG/ML
INJECTION, SOLUTION EPIDURAL AS NEEDED
Status: DISCONTINUED | OUTPATIENT
Start: 2024-10-31 | End: 2024-11-01

## 2024-10-31 RX ORDER — ESCITALOPRAM OXALATE 10 MG/1
20 TABLET ORAL DAILY
Status: DISCONTINUED | OUTPATIENT
Start: 2024-10-31 | End: 2024-11-03 | Stop reason: HOSPADM

## 2024-10-31 RX ORDER — BUPIVACAINE HYDROCHLORIDE 2.5 MG/ML
30 INJECTION, SOLUTION EPIDURAL; INFILTRATION; INTRACAUDAL ONCE AS NEEDED
Status: DISCONTINUED | OUTPATIENT
Start: 2024-10-31 | End: 2024-11-01

## 2024-10-31 RX ORDER — ONDANSETRON 2 MG/ML
4 INJECTION INTRAMUSCULAR; INTRAVENOUS EVERY 6 HOURS PRN
Status: DISCONTINUED | OUTPATIENT
Start: 2024-10-31 | End: 2024-11-01

## 2024-10-31 RX ORDER — SODIUM CHLORIDE, SODIUM LACTATE, POTASSIUM CHLORIDE, CALCIUM CHLORIDE 600; 310; 30; 20 MG/100ML; MG/100ML; MG/100ML; MG/100ML
125 INJECTION, SOLUTION INTRAVENOUS CONTINUOUS
Status: DISCONTINUED | OUTPATIENT
Start: 2024-10-31 | End: 2024-11-01

## 2024-10-31 RX ADMIN — Medication 2 MILLI-UNITS/MIN: at 15:22

## 2024-10-31 RX ADMIN — SODIUM CHLORIDE 5 MILLION UNITS: 0.9 INJECTION, SOLUTION INTRAVENOUS at 15:04

## 2024-10-31 RX ADMIN — ONDANSETRON 4 MG: 2 INJECTION INTRAMUSCULAR; INTRAVENOUS at 16:43

## 2024-10-31 RX ADMIN — SODIUM CHLORIDE, SODIUM LACTATE, POTASSIUM CHLORIDE, AND CALCIUM CHLORIDE 999 ML/HR: .6; .31; .03; .02 INJECTION, SOLUTION INTRAVENOUS at 18:00

## 2024-10-31 RX ADMIN — ESCITALOPRAM OXALATE 20 MG: 10 TABLET ORAL at 15:15

## 2024-10-31 RX ADMIN — ROPIVACAINE HYDROCHLORIDE 8 ML: 2 INJECTION EPIDURAL; INFILTRATION; PERINEURAL at 18:42

## 2024-10-31 RX ADMIN — SODIUM CHLORIDE, SODIUM LACTATE, POTASSIUM CHLORIDE, AND CALCIUM CHLORIDE 125 ML/HR: .6; .31; .03; .02 INJECTION, SOLUTION INTRAVENOUS at 15:04

## 2024-10-31 RX ADMIN — ROPIVACAINE HYDROCHLORIDE: 2 INJECTION, SOLUTION EPIDURAL; INFILTRATION at 19:08

## 2024-10-31 RX ADMIN — PENICILLIN G POTASSIUM 2.5 MILLION UNITS: 20000000 INJECTION, POWDER, FOR SOLUTION INTRAVENOUS at 23:42

## 2024-10-31 RX ADMIN — PENICILLIN G POTASSIUM 2.5 MILLION UNITS: 20000000 INJECTION, POWDER, FOR SOLUTION INTRAVENOUS at 19:23

## 2024-10-31 RX ADMIN — LIDOCAINE HYDROCHLORIDE AND EPINEPHRINE 3 ML: 15; 5 INJECTION, SOLUTION EPIDURAL at 18:37

## 2024-10-31 RX ADMIN — ROPIVACAINE HYDROCHLORIDE 8 ML/HR: 2 INJECTION EPIDURAL; INFILTRATION; PERINEURAL at 18:49

## 2024-10-31 NOTE — ANESTHESIA PREPROCEDURE EVALUATION
Procedure:  LABOR ANALGESIA    Relevant Problems   ANESTHESIA (within normal limits)      CARDIO   (+) Migraine without aura and without status migrainosus, not intractable   (+) Pre-eclampsia in third trimester      ENDO (within normal limits)      GI/HEPATIC (within normal limits)      /RENAL   (+) Nephrocalcinosis      GYN   (+) 38 weeks gestation of pregnancy      HEMATOLOGY   (+) Anemia      MUSCULOSKELETAL   (+) Chronic midline low back pain without sciatica      NEURO/PSYCH   (+) Anxiety   (+) Chronic midline low back pain without sciatica   (+) Migraine without aura and without status migrainosus, not intractable      PULMONARY (within normal limits)      Obstetrics/Gynecology   (+) Gestational diabetes        Physical Exam    Airway    Mallampati score: I  TM Distance: >3 FB  Neck ROM: full     Dental   No notable dental hx     Cardiovascular  Rhythm: regular, Rate: normal, Cardiovascular exam normal    Pulmonary  Pulmonary exam normal Breath sounds clear to auscultation    Other Findings  post-pubertal.      Anesthesia Plan  ASA Score- 2     Anesthesia Type- epidural with ASA Monitors.         Additional Monitors:     Airway Plan:            Plan Factors-Exercise tolerance (METS): >4 METS.    Chart reviewed.   Existing labs reviewed. Patient summary reviewed.    Patient is not a current smoker.              Induction-     Postoperative Plan-     Perioperative Resuscitation Plan - Level 1 - Full Code.       Informed Consent- Anesthetic plan and risks discussed with patient and spouse.

## 2024-10-31 NOTE — PROGRESS NOTES
Non-Stress Testing:    Non-Stress test, equipment, procedure, and expected outcomes explained. Reviewed fetal kick counts and when to call OB.Verified patient understanding of fetal kick counts with teach back method. Patient reports feeling daily fetal movements. Patient has no questions or concerns.     Reviewed non-stress test with HLALIE Fowler.       Patient verbalizes +FM. Pt denies ALL:               Leaking of fluid   Contractions   Vaginal bleeding   Decreased fetal movement    Initial /102. Denies HA, RUQ pain, visual changes.    Repeat /102. CINTHIA STERLING aware, advised to go to [place] [facility] for evaluation.

## 2024-10-31 NOTE — PROGRESS NOTES
Mone Garces has no complaints today.  She reports regular fetal movements and does not report any problems.  She is here today at 38w2d for an ultrasound for fetal growth.    Problem list:  Obesity  History of anti-M, anti-S and anti-K antibodies prior to pregnancy on 4/4/2022.  In this pregnancy only anti-M was identified on 4/16/2024.  but not titered.  Her partner has the M antigen.  Repeat antibody screen on 9/16/2024 showed that her antibody level anti-M was too low to be detected.   History of prior GDM and failed her Glucola test with a Glucola of 170 and has not completed her 3-hour glucose tolerance test.  She reports that intermittently she has checked her blood sugars in the past and they were normal but she was unable to remember her blood sugars well enough to report a range to me today.  She has not eaten anything today.  Elevated blood pressures today of 142/102 on 2 separate checks.  She reports no sign of unresolving headaches, right upper quadrant pain or edema or visual changes.     Ultrasound findings:  The ultrasound today shows a fetus with a an enlarged abdomen greater than 99th percentile.  The fetal abdomen is discordant from the fetal head circumference by 4 cm which is significant.  View of the fetal abdomen shows that the meconium in the fetal abdomen in the area of the transverse colon appears more echogenic than normal.  There is no evidence for dilated loops of bowel or fluid-filled bowel or calcifications in the abdomen.     NST is reactive  Blood pressure 142/102  Abdomen is soft and nontender during her scan.  Extremities show no edema of the still extremities    Pregnancy ultrasound has limitations and is unable to detect all forms of fetal congenital abnormalities.  The inaccuracy in the EFW can be off by 1 lb either way in the third trimester.    Specific counseling was provided on the following problems:  1.  Elevated blood pressures today.  Recommend sending  her to labor and delivery for assessment for preeclampsia.      2.  While there she can have her fasting blood sugar and postprandial blood sugars and a hgba1c checked to further define if she has gestational diabetes.  Would also check an antibody screen and antibody titer while there.     3.   Recommend assessment of the  after delivery for this increase in the echogenicity seen in the fetal meconium in the transverse colon.     4.   We discussed the findings today of discordant growth with the fetal abdomen measuring 4 cm larger than the fetal head.  Her last baby weighed 7 pounds 10 ounces at birth and had an 8-minute second stage.  The last growth scan in her previous pregnancy showed a fetal abdomen in the 90th percentile and minimal discordance in the fetal abdomen of around 1.5 cm.    Follow up recommended:   No further follow-up ultrasounds for growth are recommended at this time.  Sent to labor and delivery with recommendations as seen above under the counseling section.     At this time with her history of gestational diabetes and a fetus with a large fetal abdomen and that she did not complete her diabetes screening I suspect she has undiagnosed gestational diabetes and due to her noncompliance with testing, would recommend consideration for delivery.     At this time I also suspect she has gestational hypertension which is another indication for delivery.    Pre visit time reviewing her records   15 minutes  Face to face time 10 minutes  Post visit time on documentation of note, updating her problem list, adding orders and prescriptions 25 minutes.  Procedures that were completed today were charged separately.   The level of decision making was moderate complexity.    Fior Leyva MD

## 2024-10-31 NOTE — LETTER
October 31, 2024     Stacey Fournier MD  [address]  [address]  [place] [address]    Patient: Mone Garces   YOB: 1995   Date of Visit: 10/31/2024       Dear Dr. Fournier:    Thank you for referring Mone Garces to me for evaluation. Below are my notes for this consultation.    If you have questions, please do not hesitate to call me. I look forward to following your patient along with you.         Sincerely,        Fior Leyva MD        CC: MD Fior Farris MD  10/31/2024 10:49 AM  Sign when Signing Visit  Mone Garces has no complaints today.  She reports regular fetal movements and does not report any problems.  She is here today at 38w2d for an ultrasound for fetal growth.    Problem list:  Obesity  History of anti-M, anti-S and anti-K antibodies prior to pregnancy on 4/4/2022.  In this pregnancy only anti-M was identified on 4/16/2024.  but not titered.  Her partner has the M antigen.  Repeat antibody screen on 9/16/2024 showed that her antibody level anti-M was too low to be detected.   History of prior GDM and failed her Glucola test with a Glucola of 170 and has not completed her 3-hour glucose tolerance test.  She reports that intermittently she has checked her blood sugars in the past and they were normal but she was unable to remember her blood sugars well enough to report a range to me today.  She has not eaten anything today.  Elevated blood pressures today of 142/102 on 2 separate checks.  She reports no sign of unresolving headaches, right upper quadrant pain or edema or visual changes.     Ultrasound findings:  The ultrasound today shows a fetus with a an enlarged abdomen greater than 99th percentile.  The fetal abdomen is discordant from the fetal head circumference by 4 cm which is significant.  View of the fetal abdomen shows that the meconium in the fetal abdomen in the area of the transverse  colon appears more echogenic than normal.  There is no evidence for dilated loops of bowel or fluid-filled bowel or calcifications in the abdomen.     NST is reactive  Blood pressure 142/102  Abdomen is soft and nontender during her scan.  Extremities show no edema of the still extremities    Pregnancy ultrasound has limitations and is unable to detect all forms of fetal congenital abnormalities.  The inaccuracy in the EFW can be off by 1 lb either way in the third trimester.    Specific counseling was provided on the following problems:  1.  Elevated blood pressures today.  Recommend sending her to labor and delivery for assessment for preeclampsia.      2.  While there she can have her fasting blood sugar and postprandial blood sugars and a hgba1c checked to further define if she has gestational diabetes.  Would also check an antibody screen and antibody titer while there.     3.   Recommend assessment of the  after delivery for this increase in the echogenicity seen in the fetal meconium in the transverse colon.     4.   We discussed the findings today of discordant growth with the fetal abdomen measuring 4 cm larger than the fetal head.  Her last baby weighed 7 pounds 10 ounces at birth and had an 8-minute second stage.  The last growth scan in her previous pregnancy showed a fetal abdomen in the 90th percentile and minimal discordance in the fetal abdomen of around 1.5 cm.    Follow up recommended:   No further follow-up ultrasounds for growth are recommended at this time.  Sent to labor and delivery with recommendations as seen above under the counseling section.     At this time with her history of gestational diabetes and a fetus with a large fetal abdomen and that she did not complete her diabetes screening I suspect she has undiagnosed gestational diabetes and due to her noncompliance with testing, would recommend consideration for delivery.     At this time I also suspect she has gestational  hypertension which is another indication for delivery.    Pre visit time reviewing her records   15 minutes  Face to face time 10 minutes  Post visit time on documentation of note, updating her problem list, adding orders and prescriptions 25 minutes.  Procedures that were completed today were charged separately.   The level of decision making was moderate complexity.    Fior Leyva MD

## 2024-10-31 NOTE — OB LABOR/OXYTOCIN SAFETY PROGRESS
Oxytocin Safety Progress Check Note - Mone Garces 29 y.o. female MRN: [id number]    Unit/Bed#: -01 Encounter: [id number]    Dose (courtney-units/min) Oxytocin: 6 courtney-units/min  Contraction Frequency (minutes): 0  Contraction Intensity: Mild/Moderate  Uterine Activity Characteristics: Irritability  Cervical Dilation: 5        Cervical Effacement: 60  Fetal Station: -3  Baseline Rate (FHR): 135 bpm  Fetal Heart Rate (FHT): 175 BPM  FHR Category: 1               Vital Signs:   Vitals:    10/31/24 1907   BP: 111/80   Pulse: 90   Resp:    Temp:        Notes/comments:   Patient comfortable with epidural, AROM'd for clear fluid. SVE as above. FHT category 1. Continue pitocin titration.     Olena Eckert MD 10/31/2024 7:17 PM

## 2024-10-31 NOTE — ASSESSMENT & PLAN NOTE
Lab Results   Component Value Date    HGBA1C 5.9 (H) 10/31/2024   Repeat hgb A1c collected on admission  F/u outpatient

## 2024-10-31 NOTE — ANESTHESIA PROCEDURE NOTES
Epidural Block    Patient location during procedure: OB/L&D  Start time: 10/31/2024 6:37 PM  Reason for block: primary anesthetic  Staffing  Performed by: Coy Deleon DO  Authorized by: Coy Deleon DO    Preanesthetic Checklist  Completed: patient identified, IV checked, site marked, risks and benefits discussed, surgical consent, monitors and equipment checked, pre-op evaluation and timeout performed  Epidural  Patient position: sitting  Prep: Betadine  Sedation Level: no sedation  Patient monitoring: heart rate and frequent blood pressure checks  Approach: midline  Location: lumbar, L3-4  Injection technique: BIRD air  Needle  Needle type: Tuohy   Needle gauge: 18 G  Needle insertion depth: 5 cm  Catheter type: side hole, multi-orifice and closed tip  Catheter size: 20 G  Catheter at skin depth: 8 cm  Catheter securement method: clear occlusive dressing, stabilization device and tape  Test dose: negative  Assessment  Number of attempts: 1negative aspiration for CSF, negative aspiration for heme and no paresthesia on injection  patient tolerated the procedure well with no immediate complications

## 2024-10-31 NOTE — PLAN OF CARE
Problem: BIRTH - VAGINAL/ SECTION  Goal: Fetal and maternal status remain reassuring during the birth process  Description: INTERVENTIONS:  - Monitor vital signs  - Monitor fetal heart rate  - Monitor uterine activity  - Monitor labor progression (vaginal delivery)  - DVT prophylaxis  - Antibiotic prophylaxis  Outcome: Progressing  Goal: Emotionally satisfying birthing experience for mother/fetus  Description: Interventions:  - Assess, plan, implement and evaluate the nursing care given to the patient in labor  - Advocate the philosophy that each childbirth experience is a unique experience and support the family's chosen level of involvement and control during the labor process   - Actively participate in both the patient's and family's teaching of the birth process  - Consider cultural, Islam and age-specific factors and plan care for the patient in labor  Outcome: Progressing     Problem: Knowledge Deficit  Goal: Verbalizes understanding of labor plan  Description: Assess patient/family/caregiver's baseline knowledge level and ability to understand information.  Provide education via patient/family/caregiver's preferred learning method at appropriate level of understanding.     1. Provide teaching at level of understanding.  2. Provide teaching via preferred learning method(s).  Outcome: Progressing  Goal: Patient/family/caregiver demonstrates understanding of disease process, treatment plan, medications, and discharge instructions  Description: Complete learning assessment and assess knowledge base.  Interventions:  - Provide teaching at level of understanding  - Provide teaching via preferred learning methods  Outcome: Progressing     Problem: Labor & Delivery  Goal: Manages discomfort  Description: Assess and monitor for signs and symptoms of discomfort.  Assess patient's pain level regularly and per hospital policy.  Administer medications as ordered. Support use of nonpharmacological methods to  help control pain such as distraction, imagery, relaxation, and application of heat and cold.  Collaborate with interdisciplinary team and patient to determine appropriate pain management plan.    1. Include patient in decisions related to comfort.  2. Offer non-pharmacological pain management interventions.  3. Report ineffective pain management to physician.  Outcome: Progressing  Goal: Patient vital signs are stable  Description: 1. Assess vital signs - vaginal delivery.  Outcome: Progressing     Problem: PAIN - ADULT  Goal: Verbalizes/displays adequate comfort level or baseline comfort level  Description: Interventions:  - Encourage patient to monitor pain and request assistance  - Assess pain using appropriate pain scale  - Administer analgesics based on type and severity of pain and evaluate response  - Implement non-pharmacological measures as appropriate and evaluate response  - Consider cultural and social influences on pain and pain management  - Notify physician/advanced practitioner if interventions unsuccessful or patient reports new pain  Outcome: Progressing     Problem: INFECTION - ADULT  Goal: Absence or prevention of progression during hospitalization  Description: INTERVENTIONS:  - Assess and monitor for signs and symptoms of infection  - Monitor lab/diagnostic results  - Monitor all insertion sites, i.e. indwelling lines, tubes, and drains  - Monitor endotracheal if appropriate and nasal secretions for changes in amount and color  - [place] appropriate cooling/warming therapies per order  - Administer medications as ordered  - Instruct and encourage patient and family to use good hand hygiene technique  - Identify and instruct in appropriate isolation precautions for identified infection/condition  Outcome: Progressing  Goal: Absence of fever/infection during neutropenic period  Description: INTERVENTIONS:  - Monitor WBC    Outcome: Progressing     Problem: SAFETY ADULT  Goal: Patient will  remain free of falls  Description: INTERVENTIONS:  - Educate patient/family on patient safety including physical limitations  - Instruct patient to call for assistance with activity   - Consult OT/PT to assist with strengthening/mobility   - Keep Call bell within reach  - Keep bed low and locked with side rails adjusted as appropriate  - Keep care items and personal belongings within reach  - Initiate and maintain comfort rounds  - Make Fall Risk Sign visible to staff  - Apply yellow socks and bracelet for high fall risk patients  - Consider moving patient to room near nurses station  Outcome: Progressing  Goal: Maintain or return to baseline ADL function  Description: INTERVENTIONS:  -  Assess patient's ability to carry out ADLs; assess patient's baseline for ADL function and identify physical deficits which impact ability to perform ADLs (bathing, care of mouth/teeth, toileting, grooming, dressing, etc.)  - Assess/evaluate cause of self-care deficits   - Assess range of motion  - Assess patient's mobility; develop plan if impaired  - Assess patient's need for assistive devices and provide as appropriate  - Encourage maximum independence but intervene and supervise when necessary  - Involve family in performance of ADLs  - Assess for home care needs following discharge   - Consider OT consult to assist with ADL evaluation and planning for discharge  - Provide patient education as appropriate  Outcome: Progressing  Goal: Maintains/Returns to pre admission functional level  Description: INTERVENTIONS:  - Perform AM-PAC 6 Click Basic Mobility/ Daily Activity assessment daily.  - Set and communicate daily mobility goal to care team and patient/family/caregiver.   - Collaborate with rehabilitation services on mobility goals if consulted  - Out of bed for toileting  - Record patient progress and toleration of activity level   Outcome: Progressing     Problem: DISCHARGE PLANNING  Goal: Discharge to home or other facility  with appropriate resources  Description: INTERVENTIONS:  - Identify barriers to discharge w/patient and caregiver  - Arrange for needed discharge resources and transportation as appropriate  - Identify discharge learning needs (meds, wound care, etc.)  - Arrange for interpretive services to assist at discharge as needed  - Refer to Case Management Department for coordinating discharge planning if the patient needs post-hospital services based on physician/advanced practitioner order or complex needs related to functional status, cognitive ability, or social support system  Outcome: Progressing

## 2024-10-31 NOTE — LETTER
[facility] LABOR AND DELIVERY  [address] [address]  Dept: [phone]    November 3, 2024     Patient: Mone Garces   YOB: 1995   Date of Visit: 10/31/2024       To Whom it May Concern:    Mone Garces is under my professional care. Please excuse Noriega Moo from 10/31 to 11/6 for paternity leave.    If you have any questions or concerns, please don't hesitate to call.         Sincerely,          Suni Hernandez MD

## 2024-10-31 NOTE — LETTER
NST sleeve cover sheet    Patient name: Mone Garces  : 1995  MRN: [id number]    SOUMYA: Estimated Date of Delivery: 24    Obstetrician: Shantanu    Reason(s) for testing: BMI    Testing frequency:    ___  2x/wk  __x_  1x/wk  ___  Dopplers  ___  BPP?      Last growth scan: __________, _________, _________    Baby:      Male   /   Female   /   [place]             Baby Name: ___________________            IOL or  C/S: _____________________

## 2024-10-31 NOTE — ASSESSMENT & PLAN NOTE
Mildly elevated BP >4 hours apart  CBC/CMP wnl except hgb 10.1, P:C 0.4  Will continue to monitor BP closely

## 2024-10-31 NOTE — ASSESSMENT & PLAN NOTE
QBL 1028 cc s/p extra pit, TXA  S/p venofer on POD#0  Results from last 7 days   Lab Units 11/02/24  0509 11/01/24  0933 10/31/24  1414   HEMOGLOBIN g/dL 8.8* 8.5* 10.1*   HEMATOCRIT % 27.0* 26.6* 31.1*     Lines: tovar in place   Pain: Tylenol and toradol scheduled, garima 5/10 PRN    FEN: Tolerating regular diet  DVT ppx: SCDs and  Lovenox 40mg qD  Passing flatus   Incision C/D/I

## 2024-10-31 NOTE — H&P
History and Physical - Obstetrics  Mone Garces 29 y.o. female MRN: [id number]  Unit/Bed#: -01 Encounter: [id number]    ObGyn Provider:  [facility] (Michael Eckert/Arley/Shantanu)    ASSESSMENT AND PLAN:  Mone Garces is a 29 y.o. year-old  at 38w2d, Hospital Day: 1, admitted for IOL in the setting of newly diagnosed preE wo SF, poorly controlled GDM and suspected fetal macrosomia.  By issue:    38 weeks gestation of pregnancy  Assessment & Plan  Admit for IOL via pitocin titration  Analgesia at maternal request  Continuous fetal monitoring  Clear liquid diet  EFW 8#3, AC>99%, BPD 31% (pelvis proven to 7#10.8)  Anticipate     Gestational diabetes  Assessment & Plan  Lab Results   Component Value Date    HGBA1C 4.9 2022   Repeat hgb A1c collected on admission    Recent Labs     10/31/24  1437   POCGLU 114   Will monitor blood sugar q1 hour and start insulin drip if persistently elevated    * Pre-eclampsia in third trimester  Assessment & Plan  Mildly elevated BP >4 hours apart  CBC/CMP wnl except hgb 10.1, P:C 0.4  Will continue to monitor BP closely    Pregnancy complicated by fetal abdominal abnormality  Assessment & Plan  Echogenic meconium in fetus -> NICU to eval after delivery    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 Eckert    Expected LOS: >2 Midnights  Admission: INPATIENT     SUBJECTIVE:    History of Present Illness:  Mone Garces is a 29 y.o.  female at 38w2d, SOUMYA 2024,  by Ultrasound, Hospital Day: 1, who presents for IOL in the setting of newly diagnosed preE wo SF, poorly controlled GDM and suspected fetal macrosomia. She denies HA, vision changes, upper abd pain and edema. She does not have contractions, vaginal bleeding or leakage of fluid. She reports normal fetal movement.    Review of Systems   Constitutional: Negative.    HENT: Negative.     Respiratory: Negative.     Cardiovascular: Negative.    Gastrointestinal: Negative.    Genitourinary: Negative.    Musculoskeletal: Negative.    Neurological: Negative.    Psychiatric/Behavioral: Negative.     All other systems reviewed and are negative.      Current Pregnancy Problems:  Problem   38 Weeks Gestation of Pregnancy    LDASA   MSAFP wnl  Delivery consent [x]  Tdap [x]  Needs to complete 3 hour GTT  Would like to have a Nexplanon     Pre-Eclampsia in Third Trimester    Suspected at her 38-week ultrasound appointment-sent to labor and delivery     Gestational Diabetes   Macrosomia Affecting Management of Mother in Third Trimester    Based on the AC greater than 99th percentile and is measuring 4 cm larger than the fetal head at 38 weeks and 2 days.  Overall estimated fetal weight is 84%.  Patient is aware she is at risk for shoulder dystocia.      Pregnancy Complicated By Fetal Abdominal Abnormality    At 38 weeks the fetal abdomen shows that the meconium in the transverse colon appears more echogenic than normal.  There is no evidence for dilated loops of bowel or fluid-filled bowel or calcifications in the abdomen.  May be normal variant. CF screen is neg in the past. Recommend notifying peds to assess postnatally.      Short Interval Between Pregnancies Affecting Pregnancy, Antepartum   Hx of Preeclampsia, Prior Pregnancy, Currently Pregnant    LDASA      Gbs Bacteriuria    PCN during labor     Susceptible to Varicella (Non-Immune), Currently Pregnant    Varivax postpartum     Anti-M Isoimmunization Affecting Pregnancy in  Third Trimester    Anti-M antibody detected on type and screen in first trimester  Associated with mild to severe HDFN    Paternal antigen status (Hari Rebollar  23) - M positive  Monthly titers    History of Anti-M isoimmunization.  On 2022 prior to her second pregnancy she had anti-K, anti-M and anti-S identified but were not titered because she was not pregnant.  Then in her last pregnancy in  her antibody screen returned as negative on 2 separate occasions including at delivery.  On 2024 in this pregnancy only anti-M antibody was found on her initial blood work. Her partner Hari is a carrier for the M antigen and is the father of her other children.  Antibody titer 24 shows no antibodies.     Recommend checking for monthly antibodies and antibody titer for anti K, anti M and Anti S. Please repeat 10/31/24 while in L/D           Past Obstetric History:   Patient's last menstrual period was 01/10/2024 (approximate).   OB History    Para Term  AB Living   4 2 2 0 1 2   SAB IAB Ectopic Multiple Live Births   0 1 0 0 2      # Outcome Date GA Lbr Cornelius/2nd Weight Sex Type Anes PTL Lv   4 Current            3 Term 23 39w6d / 00:08 3480 g (7 lb 10.8 oz) M Vag-Spont EPI N LAURA      Birth Comments: GDM and Preeclampsia      Name: KAY,BABY BOY ([facility])      Apgar1: 9  Apgar5: 9   2 Term 12 37w0d  2920 g (6 lb 7 oz) F Vag-Spont EPI N LAURA   1 IAB               Obstetric Comments   Menarche 12   Cycles Q 28d   Vaginal birth x 1       Pregnancy Plan:  Pregnancy: Green     Delivery Plans  Planned delivery method: Vaginal  Planned delivery location: [place] L&D  Acceptable blood products: All     Post-Delivery Plans  Feeding intentions: Breast Milk    HISTORICAL INFORMATION:  Past Medical History:   Diagnosis Date    Abnormal Pap smear of cervix     2021 Colpo: TORRES 1; 2022 pap negative    Depression     Frequent headaches     Genetic screening     2023-[facility]  "negative    Immunization, varicella     Migraine      (normal spontaneous vaginal delivery)     Varicella     states she was vaccinated but blood work from 3/2021 says non immune     Past Surgical History:   Procedure Laterality Date    INDUCED          Family History   Problem Relation Age of Onset    Hyperlipidemia Mother     Diabetes Mother     Hypertension Mother     Kidney disease Father     Nephrolithiasis Sister     No Known Problems Sister     No Known Problems Brother     No Known Problems Brother     No Known Problems Brother     Diabetes Maternal Grandmother     COPD Maternal Grandmother     Alcohol abuse Maternal Grandfather     Diabetes Paternal Grandmother     HIV Paternal Grandfather     No Known Problems Daughter     No Known Problems Family     Breast cancer Neg Hx     Colon cancer Neg Hx     Ovarian cancer Neg Hx     Cancer Neg Hx     Pulmonary embolism Neg Hx     Heart attack Neg Hx     Stroke Neg Hx     Miscarriages / Stillbirths Neg Hx     Seizures Neg Hx     Thyroid disease Neg Hx     Transient ischemic attack Neg Hx        Allergies:   No Known Allergies    Current Medications:  Current Outpatient Medications   Medication Instructions    escitalopram (LEXAPRO) 20 mg, Oral, Daily    ferrous sulfate 324 mg, Oral, 2 times daily before meals    metoclopramide (REGLAN) 10 mg, Oral, 4 times daily    Prenatal Vit-Fe Fumarate-FA (Trinatal Rx 1) 60-1 MG TABS 1 tablet, Oral, Daily       OBJECTIVE:  Vitals:  Patient Vitals for the past 24 hrs:   BP Temp Temp src Pulse Resp Height Weight   10/31/24 1351 137/92 98.2 °F (36.8 °C) Oral 101 20 4' 11\" (1.499 m) 91.4 kg (201 lb 6.4 oz)     Body mass index is 40.68 kg/m².    Invasive Devices       Peripheral Intravenous Line  Duration             Peripheral IV 10/31/24 Dorsal (posterior);Left Hand <1 day                    Physical Exam  GEN: The patient was alert and oriented x3, pleasant well-appearing female in no acute distress.   CV: Regular " rate  PULM: Non-labored respirations  MSK: Normal gait  Skin: Warm, dry  Neuro: No focal deficits  Psych: Normal affect and judgement, cooperative      Cervical Exam:    Cervical Dilation: 3-4  Cervical Effacement: 50  Cervical Consistency: Medium  Fetal Station: -2  Presentation: Vertex  Position: Unknown  Method: Manual  OB Examiner: Wei      Fetal Heart Tracing:  FHT:   Baseline Rate (FHR): 145 bpm  FHR Category: Category I    [place]:  Contraction Frequency (minutes): 0  Contraction Duration (seconds): 0  Contraction Intensity: Mild      Prenatal Labs:  Lab Results   Component Value Date/Time    ABO B 09/16/2024 12:10 PM    RH Positive 09/16/2024 12:10 PM    HGB 10.1 (L) 10/31/2024 02:14 PM    HCT 31.1 (L) 10/31/2024 02:14 PM     10/31/2024 02:14 PM    HIVAGAB Non-Reactive 11/15/2022 10:36 AM    HEPBSAG Non-reactive 04/16/2024 10:34 AM    RUBELLAIGGQT 23.4 04/16/2024 10:34 AM    VARICELLAIGG NON-IMMUNE (A) 04/16/2024 10:34 AM    VARICELLAIGG NON-IMMUNE (A) 03/10/2021 10:46 AM    [id number] 170 (H) 08/27/2024 11:09 AM    [id number] 131 04/16/2024 10:34 AM    GLUF 100 (H) 12/21/2022 08:19 AM    STREPGRPB Positive (A) 11/20/2023 03:58 PM            Allie Carvajal MD  OB/GYN, PGY-4  10/31/2024  3:37 PM

## 2024-11-01 ENCOUNTER — TELEPHONE (OUTPATIENT)
Dept: OBGYN CLINIC | Facility: CLINIC | Age: 29
End: 2024-11-01

## 2024-11-01 PROBLEM — Z98.891 S/P CESAREAN SECTION: Status: ACTIVE | Noted: 2024-04-26

## 2024-11-01 LAB
ALBUMIN SERPL BCG-MCNC: 2.8 G/DL (ref 3.5–5)
ALP SERPL-CCNC: 103 U/L (ref 34–104)
ALT SERPL W P-5'-P-CCNC: 6 U/L (ref 7–52)
ANION GAP SERPL CALCULATED.3IONS-SCNC: 5 MMOL/L (ref 4–13)
AST SERPL W P-5'-P-CCNC: 13 U/L (ref 13–39)
BASE EXCESS BLDCOA CALC-SCNC: -3.9 MMOL/L (ref 3–11)
BASE EXCESS BLDCOV CALC-SCNC: -3.8 MMOL/L (ref 1–9)
BASOPHILS # BLD AUTO: 0.04 THOUSANDS/ΜL (ref 0–0.1)
BASOPHILS NFR BLD AUTO: 0 % (ref 0–1)
BILIRUB SERPL-MCNC: 0.28 MG/DL (ref 0.2–1)
BUN SERPL-MCNC: 12 MG/DL (ref 5–25)
CALCIUM ALBUM COR SERPL-MCNC: 9.6 MG/DL (ref 8.3–10.1)
CALCIUM SERPL-MCNC: 8.6 MG/DL (ref 8.4–10.2)
CHLORIDE SERPL-SCNC: 106 MMOL/L (ref 96–108)
CO2 SERPL-SCNC: 24 MMOL/L (ref 21–32)
CREAT SERPL-MCNC: 0.73 MG/DL (ref 0.6–1.3)
EOSINOPHIL # BLD AUTO: 0.01 THOUSAND/ΜL (ref 0–0.61)
EOSINOPHIL NFR BLD AUTO: 0 % (ref 0–6)
ERYTHROCYTE [DISTWIDTH] IN BLOOD BY AUTOMATED COUNT: 14.1 % (ref 11.6–15.1)
GFR SERPL CREATININE-BSD FRML MDRD: 112 ML/MIN/1.73SQ M
GLUCOSE SERPL-MCNC: 110 MG/DL (ref 65–140)
HCO3 BLDCOA-SCNC: 24.3 MMOL/L (ref 17.3–27.3)
HCO3 BLDCOV-SCNC: 21.6 MMOL/L (ref 12.2–28.6)
HCT VFR BLD AUTO: 26.6 % (ref 34.8–46.1)
HGB BLD-MCNC: 8.5 G/DL (ref 11.5–15.4)
IMM GRANULOCYTES # BLD AUTO: 0.09 THOUSAND/UL (ref 0–0.2)
IMM GRANULOCYTES NFR BLD AUTO: 1 % (ref 0–2)
LYMPHOCYTES # BLD AUTO: 1.81 THOUSANDS/ΜL (ref 0.6–4.47)
LYMPHOCYTES NFR BLD AUTO: 13 % (ref 14–44)
MCH RBC QN AUTO: 26.6 PG (ref 26.8–34.3)
MCHC RBC AUTO-ENTMCNC: 32 G/DL (ref 31.4–37.4)
MCV RBC AUTO: 83 FL (ref 82–98)
MONOCYTES # BLD AUTO: 0.99 THOUSAND/ΜL (ref 0.17–1.22)
MONOCYTES NFR BLD AUTO: 7 % (ref 4–12)
NEUTROPHILS # BLD AUTO: 10.67 THOUSANDS/ΜL (ref 1.85–7.62)
NEUTS SEG NFR BLD AUTO: 79 % (ref 43–75)
NRBC BLD AUTO-RTO: 0 /100 WBCS
O2 CT VFR BLDCOA CALC: 8.3 ML/DL
OXYHGB MFR BLDCOA: 37.4 %
OXYHGB MFR BLDCOV: 60.6 %
PCO2 BLDCOA: 56.6 MM[HG] (ref 30–60)
PCO2 BLDCOV: 40.7 MM HG (ref 27–43)
PH BLDCOA: 7.25 [PH] (ref 7.23–7.43)
PH BLDCOV: 7.34 [PH] (ref 7.19–7.49)
PLATELET # BLD AUTO: 218 THOUSANDS/UL (ref 149–390)
PMV BLD AUTO: 10.8 FL (ref 8.9–12.7)
PO2 BLDCOA: 17.7 MM HG (ref 5–25)
PO2 BLDCOV: 23.8 MM HG (ref 15–45)
POTASSIUM SERPL-SCNC: 4.3 MMOL/L (ref 3.5–5.3)
PROT SERPL-MCNC: 5.5 G/DL (ref 6.4–8.4)
RBC # BLD AUTO: 3.2 MILLION/UL (ref 3.81–5.12)
SAO2 % BLDCOV: 12.9 ML/DL
SODIUM SERPL-SCNC: 135 MMOL/L (ref 135–147)
TREPONEMA PALLIDUM IGG+IGM AB [PRESENCE] IN SERUM OR PLASMA BY IMMUNOASSAY: NORMAL
WBC # BLD AUTO: 13.61 THOUSAND/UL (ref 4.31–10.16)

## 2024-11-01 PROCEDURE — 80053 COMPREHEN METABOLIC PANEL: CPT | Performed by: OBSTETRICS & GYNECOLOGY

## 2024-11-01 PROCEDURE — 82805 BLOOD GASES W/O2 SATURATION: CPT | Performed by: OBSTETRICS & GYNECOLOGY

## 2024-11-01 PROCEDURE — 94762 N-INVAS EAR/PLS OXIMTRY CONT: CPT

## 2024-11-01 PROCEDURE — 94760 N-INVAS EAR/PLS OXIMETRY 1: CPT

## 2024-11-01 PROCEDURE — 59514 CESAREAN DELIVERY ONLY: CPT | Performed by: OBSTETRICS & GYNECOLOGY

## 2024-11-01 PROCEDURE — 88307 TISSUE EXAM BY PATHOLOGIST: CPT | Performed by: PATHOLOGY

## 2024-11-01 PROCEDURE — 85025 COMPLETE CBC W/AUTO DIFF WBC: CPT | Performed by: OBSTETRICS & GYNECOLOGY

## 2024-11-01 PROCEDURE — NC001 PR NO CHARGE: Performed by: STUDENT IN AN ORGANIZED HEALTH CARE EDUCATION/TRAINING PROGRAM

## 2024-11-01 RX ORDER — DIPHENHYDRAMINE HYDROCHLORIDE 50 MG/ML
INJECTION INTRAMUSCULAR; INTRAVENOUS
Status: COMPLETED
Start: 2024-11-01 | End: 2024-11-01

## 2024-11-01 RX ORDER — IBUPROFEN 600 MG/1
600 TABLET, FILM COATED ORAL EVERY 6 HOURS
Status: DISCONTINUED | OUTPATIENT
Start: 2024-11-02 | End: 2024-11-02

## 2024-11-01 RX ORDER — ONDANSETRON 2 MG/ML
4 INJECTION INTRAMUSCULAR; INTRAVENOUS EVERY 6 HOURS PRN
Status: ACTIVE | OUTPATIENT
Start: 2024-11-01 | End: 2024-11-02

## 2024-11-01 RX ORDER — MORPHINE SULFATE 0.5 MG/ML
INJECTION, SOLUTION EPIDURAL; INTRATHECAL; INTRAVENOUS
Status: COMPLETED
Start: 2024-11-01 | End: 2024-11-01

## 2024-11-01 RX ORDER — OXYCODONE HYDROCHLORIDE 5 MG/1
5 TABLET ORAL EVERY 4 HOURS PRN
Status: DISCONTINUED | OUTPATIENT
Start: 2024-11-02 | End: 2024-11-03 | Stop reason: HOSPADM

## 2024-11-01 RX ORDER — TRANEXAMIC ACID 100 MG/ML
INJECTION, SOLUTION INTRAVENOUS
Status: COMPLETED
Start: 2024-11-01 | End: 2024-11-01

## 2024-11-01 RX ORDER — NALBUPHINE HYDROCHLORIDE 10 MG/ML
5 INJECTION INTRAMUSCULAR; INTRAVENOUS; SUBCUTANEOUS
Status: DISPENSED | OUTPATIENT
Start: 2024-11-01 | End: 2024-11-02

## 2024-11-01 RX ORDER — OXYCODONE HYDROCHLORIDE 5 MG/1
10 TABLET ORAL EVERY 4 HOURS PRN
Status: DISCONTINUED | OUTPATIENT
Start: 2024-11-02 | End: 2024-11-03 | Stop reason: HOSPADM

## 2024-11-01 RX ORDER — KETOROLAC TROMETHAMINE 30 MG/ML
INJECTION, SOLUTION INTRAMUSCULAR; INTRAVENOUS
Status: COMPLETED
Start: 2024-11-01 | End: 2024-11-01

## 2024-11-01 RX ORDER — OXYTOCIN/RINGER'S LACTATE 30/500 ML
62.5 PLASTIC BAG, INJECTION (ML) INTRAVENOUS ONCE
Status: DISCONTINUED | OUTPATIENT
Start: 2024-11-01 | End: 2024-11-02

## 2024-11-01 RX ORDER — CALCIUM CARBONATE 500 MG/1
1000 TABLET, CHEWABLE ORAL DAILY PRN
Status: DISCONTINUED | OUTPATIENT
Start: 2024-11-01 | End: 2024-11-03 | Stop reason: HOSPADM

## 2024-11-01 RX ORDER — HYDROMORPHONE HCL/PF 1 MG/ML
1 SYRINGE (ML) INJECTION EVERY 2 HOUR PRN
Status: DISCONTINUED | OUTPATIENT
Start: 2024-11-01 | End: 2024-11-02

## 2024-11-01 RX ORDER — NALOXONE HYDROCHLORIDE 0.4 MG/ML
0.1 INJECTION, SOLUTION INTRAMUSCULAR; INTRAVENOUS; SUBCUTANEOUS
Status: ACTIVE | OUTPATIENT
Start: 2024-11-01 | End: 2024-11-02

## 2024-11-01 RX ORDER — DIPHENHYDRAMINE HCL 25 MG
25 TABLET ORAL EVERY 6 HOURS PRN
Status: DISCONTINUED | OUTPATIENT
Start: 2024-11-01 | End: 2024-11-03 | Stop reason: HOSPADM

## 2024-11-01 RX ORDER — OXYCODONE HYDROCHLORIDE 5 MG/1
10 TABLET ORAL EVERY 4 HOURS PRN
Status: DISCONTINUED | OUTPATIENT
Start: 2024-11-01 | End: 2024-11-02

## 2024-11-01 RX ORDER — ONDANSETRON 2 MG/ML
4 INJECTION INTRAMUSCULAR; INTRAVENOUS EVERY 8 HOURS PRN
Status: DISCONTINUED | OUTPATIENT
Start: 2024-11-02 | End: 2024-11-03 | Stop reason: HOSPADM

## 2024-11-01 RX ORDER — FUROSEMIDE 20 MG/1
20 TABLET ORAL DAILY
Status: DISCONTINUED | OUTPATIENT
Start: 2024-11-01 | End: 2024-11-03 | Stop reason: HOSPADM

## 2024-11-01 RX ORDER — ACETAMINOPHEN 325 MG/1
650 TABLET ORAL EVERY 6 HOURS SCHEDULED
Status: DISCONTINUED | OUTPATIENT
Start: 2024-11-01 | End: 2024-11-03 | Stop reason: HOSPADM

## 2024-11-01 RX ORDER — ENOXAPARIN SODIUM 100 MG/ML
40 INJECTION SUBCUTANEOUS
Status: DISCONTINUED | OUTPATIENT
Start: 2024-11-01 | End: 2024-11-03 | Stop reason: HOSPADM

## 2024-11-01 RX ORDER — LIDOCAINE HCL/EPINEPHRINE/PF 2%-1:200K
VIAL (ML) INJECTION AS NEEDED
Status: DISCONTINUED | OUTPATIENT
Start: 2024-11-01 | End: 2024-11-01

## 2024-11-01 RX ORDER — DOCUSATE SODIUM 100 MG/1
100 CAPSULE, LIQUID FILLED ORAL 2 TIMES DAILY
Status: DISCONTINUED | OUTPATIENT
Start: 2024-11-01 | End: 2024-11-03 | Stop reason: HOSPADM

## 2024-11-01 RX ORDER — SODIUM CHLORIDE, SODIUM LACTATE, POTASSIUM CHLORIDE, CALCIUM CHLORIDE 600; 310; 30; 20 MG/100ML; MG/100ML; MG/100ML; MG/100ML
125 INJECTION, SOLUTION INTRAVENOUS CONTINUOUS
Status: DISCONTINUED | OUTPATIENT
Start: 2024-11-01 | End: 2024-11-03 | Stop reason: HOSPADM

## 2024-11-01 RX ORDER — KETOROLAC TROMETHAMINE 30 MG/ML
30 INJECTION, SOLUTION INTRAMUSCULAR; INTRAVENOUS EVERY 6 HOURS SCHEDULED
Status: DISCONTINUED | OUTPATIENT
Start: 2024-11-02 | End: 2024-11-02

## 2024-11-01 RX ORDER — LIDOCAINE HCL/EPINEPHRINE/PF 2%-1:200K
VIAL (ML) INJECTION
Status: COMPLETED
Start: 2024-11-01 | End: 2024-11-01

## 2024-11-01 RX ORDER — KETOROLAC TROMETHAMINE 30 MG/ML
30 INJECTION, SOLUTION INTRAMUSCULAR; INTRAVENOUS EVERY 6 HOURS
Status: DISCONTINUED | OUTPATIENT
Start: 2024-11-01 | End: 2024-11-02

## 2024-11-01 RX ORDER — CEFAZOLIN SODIUM 2 G/50ML
2000 SOLUTION INTRAVENOUS ONCE
Status: COMPLETED | OUTPATIENT
Start: 2024-11-01 | End: 2024-11-01

## 2024-11-01 RX ORDER — ONDANSETRON 2 MG/ML
INJECTION INTRAMUSCULAR; INTRAVENOUS
Status: COMPLETED
Start: 2024-11-01 | End: 2024-11-01

## 2024-11-01 RX ORDER — TRANEXAMIC ACID 100 MG/ML
INJECTION, SOLUTION INTRAVENOUS AS NEEDED
Status: DISCONTINUED | OUTPATIENT
Start: 2024-11-01 | End: 2024-11-01

## 2024-11-01 RX ORDER — BENZOCAINE/MENTHOL 6 MG-10 MG
1 LOZENGE MUCOUS MEMBRANE DAILY PRN
Status: DISCONTINUED | OUTPATIENT
Start: 2024-11-01 | End: 2024-11-03 | Stop reason: HOSPADM

## 2024-11-01 RX ORDER — NIFEDIPINE 30 MG/1
30 TABLET, EXTENDED RELEASE ORAL DAILY
Status: DISCONTINUED | OUTPATIENT
Start: 2024-11-01 | End: 2024-11-03 | Stop reason: HOSPADM

## 2024-11-01 RX ORDER — KETOROLAC TROMETHAMINE 30 MG/ML
INJECTION, SOLUTION INTRAMUSCULAR; INTRAVENOUS AS NEEDED
Status: DISCONTINUED | OUTPATIENT
Start: 2024-11-01 | End: 2024-11-01

## 2024-11-01 RX ORDER — OXYTOCIN/RINGER'S LACTATE 30/500 ML
PLASTIC BAG, INJECTION (ML) INTRAVENOUS
Status: COMPLETED
Start: 2024-11-01 | End: 2024-11-01

## 2024-11-01 RX ORDER — ONDANSETRON 2 MG/ML
INJECTION INTRAMUSCULAR; INTRAVENOUS AS NEEDED
Status: DISCONTINUED | OUTPATIENT
Start: 2024-11-01 | End: 2024-11-01

## 2024-11-01 RX ORDER — DIPHENHYDRAMINE HYDROCHLORIDE 50 MG/ML
INJECTION INTRAMUSCULAR; INTRAVENOUS AS NEEDED
Status: DISCONTINUED | OUTPATIENT
Start: 2024-11-01 | End: 2024-11-01

## 2024-11-01 RX ORDER — MORPHINE SULFATE 0.5 MG/ML
INJECTION, SOLUTION EPIDURAL; INTRATHECAL; INTRAVENOUS AS NEEDED
Status: DISCONTINUED | OUTPATIENT
Start: 2024-11-01 | End: 2024-11-01

## 2024-11-01 RX ORDER — ONDANSETRON 2 MG/ML
4 INJECTION INTRAMUSCULAR; INTRAVENOUS EVERY 8 HOURS PRN
Status: DISCONTINUED | OUTPATIENT
Start: 2024-11-01 | End: 2024-11-01

## 2024-11-01 RX ORDER — SIMETHICONE 80 MG
80 TABLET,CHEWABLE ORAL 4 TIMES DAILY PRN
Status: DISCONTINUED | OUTPATIENT
Start: 2024-11-01 | End: 2024-11-03 | Stop reason: HOSPADM

## 2024-11-01 RX ADMIN — FUROSEMIDE 20 MG: 20 TABLET ORAL at 20:07

## 2024-11-01 RX ADMIN — NALBUPHINE HYDROCHLORIDE 5 MG: 10 INJECTION, SOLUTION INTRAMUSCULAR; INTRAVENOUS; SUBCUTANEOUS at 23:12

## 2024-11-01 RX ADMIN — ENOXAPARIN SODIUM 40 MG: 40 INJECTION SUBCUTANEOUS at 12:18

## 2024-11-01 RX ADMIN — KETOROLAC TROMETHAMINE 30 MG: 30 INJECTION, SOLUTION INTRAMUSCULAR; INTRAVENOUS at 17:04

## 2024-11-01 RX ADMIN — MORPHINE SULFATE 2 MG: 0.5 INJECTION, SOLUTION EPIDURAL; INTRATHECAL; INTRAVENOUS at 02:44

## 2024-11-01 RX ADMIN — DOCUSATE SODIUM 100 MG: 100 CAPSULE, LIQUID FILLED ORAL at 17:01

## 2024-11-01 RX ADMIN — NIFEDIPINE 30 MG: 30 TABLET, FILM COATED, EXTENDED RELEASE ORAL at 18:25

## 2024-11-01 RX ADMIN — SIMETHICONE 80 MG: 80 TABLET, CHEWABLE ORAL at 12:26

## 2024-11-01 RX ADMIN — LIDOCAINE HYDROCHLORIDE,EPINEPHRINE BITARTRATE 5 ML: 20; .005 INJECTION, SOLUTION EPIDURAL; INFILTRATION; INTRACAUDAL; PERINEURAL at 01:21

## 2024-11-01 RX ADMIN — DIPHENHYDRAMINE HYDROCHLORIDE 25 MG: 50 INJECTION, SOLUTION INTRAMUSCULAR; INTRAVENOUS at 02:14

## 2024-11-01 RX ADMIN — SODIUM CHLORIDE, SODIUM LACTATE, POTASSIUM CHLORIDE, AND CALCIUM CHLORIDE: .6; .31; .03; .02 INJECTION, SOLUTION INTRAVENOUS at 01:55

## 2024-11-01 RX ADMIN — KETOROLAC TROMETHAMINE 30 MG: 30 INJECTION, SOLUTION INTRAMUSCULAR; INTRAVENOUS at 02:43

## 2024-11-01 RX ADMIN — ONDANSETRON 4 MG: 2 INJECTION INTRAMUSCULAR; INTRAVENOUS at 01:48

## 2024-11-01 RX ADMIN — KETOROLAC TROMETHAMINE 30 MG: 30 INJECTION, SOLUTION INTRAMUSCULAR; INTRAVENOUS at 23:11

## 2024-11-01 RX ADMIN — IRON SUCROSE 200 MG: 20 INJECTION, SOLUTION INTRAVENOUS at 12:36

## 2024-11-01 RX ADMIN — LIDOCAINE HYDROCHLORIDE,EPINEPHRINE BITARTRATE 5 ML: 20; .005 INJECTION, SOLUTION EPIDURAL; INFILTRATION; INTRACAUDAL; PERINEURAL at 01:40

## 2024-11-01 RX ADMIN — DIPHENHYDRAMINE HYDROCHLORIDE 25 MG: 25 TABLET ORAL at 17:01

## 2024-11-01 RX ADMIN — DOCUSATE SODIUM 100 MG: 100 CAPSULE, LIQUID FILLED ORAL at 09:24

## 2024-11-01 RX ADMIN — Medication 500 MG: at 01:43

## 2024-11-01 RX ADMIN — Medication 250 MILLI-UNITS/MIN: at 02:23

## 2024-11-01 RX ADMIN — ESCITALOPRAM OXALATE 20 MG: 10 TABLET ORAL at 09:24

## 2024-11-01 RX ADMIN — CEFAZOLIN SODIUM 2000 MG: 2 SOLUTION INTRAVENOUS at 01:36

## 2024-11-01 RX ADMIN — ACETAMINOPHEN 650 MG: 325 TABLET, FILM COATED ORAL at 18:29

## 2024-11-01 RX ADMIN — KETOROLAC TROMETHAMINE 30 MG: 30 INJECTION, SOLUTION INTRAMUSCULAR; INTRAVENOUS at 09:24

## 2024-11-01 RX ADMIN — ACETAMINOPHEN 650 MG: 325 TABLET, FILM COATED ORAL at 12:17

## 2024-11-01 RX ADMIN — ACETAMINOPHEN 650 MG: 325 TABLET, FILM COATED ORAL at 06:26

## 2024-11-01 RX ADMIN — TRANEXAMIC ACID 1 G: 100 INJECTION, SOLUTION INTRAVENOUS at 02:11

## 2024-11-01 NOTE — OP NOTE
OPERATIVE REPORT  PATIENT NAME: Mone Garces    :  1995  MRN: [id number]  Pt Location: [facility] OR ROOM 01    SURGERY DATE: 2024    Surgeons and Role:     * Olena Eckert MD - Primary     * Emelina Oviedo MD - Assisting    Preop Diagnosis:  Pregnancy 38w3d  Preeclampsia without severe features  A1GDM, poorly controlled  Suspected fetal macrosomia  Short interval pregnancy  GBS bacteriuria  Varicella non-immune  Anti-M isoimmunization  Insufficient prenatal care    Procedure(s) (LRB):   SECTION () (N/A)    Specimen(s):  ID Type Source Tests Collected by Time Destination   1 : FOR PATHOLOGY Tissue Placenta TISSUE EXAM Olena Eckert MD 2024 0144        Surgical QBL:  1028 cc    Drains:  Urethral Catheter 16 Fr. (Active)   Number of days: 1       Anesthesia Type:   Epidural    Operative Indications:  Maternal request     Adrián Group Classification System:  No Multiple pregnancy, No Transverse or oblique lie, No Breech lie, Gestational age is > or =37 weeks, Multiparous, No Previous uterine scar, Labor induced +  is ADRIÁN GROUP 4a    Complications:   None    Procedure and Technique:    Operative Findings:  1. Viable male  at 0151 with APGARs of 8 and 9 at 1 and 5 minutes. Fetus weighted 7lb 2.3oz (3240 g).  2. Normal intact placenta with centrally inserted 3VC expressed at 0154.   3. Normal uterus, bilateral tubes and ovaries.  4. Blood gases:   Arterial pH: 7.251   Arterial base excess: -3.9   Venous pH: 7.343   Venous base excess: -3.8    Patient is a 30 yo  at 38w3d who presented for an induction of labor for poorly controlled gestational diabetes and newly diagnosed preeclampsia without severe features. There was also suspected fetal macrosomia, with an estimated AC >99%. Her induction was started with pitocin, and she was artificially ruptured for clear fluid. She received an epidural for analgesia. She progressed to complete  dilation and began to push, although fetal station was noted to be quite high. After pushing for approximately 20 minutes with no fetal descent, patient requested a  section due to lack of progress and fatigue. Patient was offered rest and placement on a peanut ball to encourage fetal rotation and descent, but after a short rest, patient again requested a  section. I counseled the patient about the risks of  section, including bleeding, infection, injury to surrounding organs, and abnormal placentation in subsequent pregnancies.     The patient was taken to the operating room. Epidural anesthesia was adequately established and Ancef and Azithromycin were given for preoperative prophylaxis.  The patient was then placed in the dorsal supine position with a left tilt of the hips. The patient was then prepped with chlorhexidine for vaginal prep and chloraprep for abdominal prep and draped in the usual sterile fashion for a Pfannenstiel skin incision.      A time out was performed to confirm correct patient and correct procedure. An incision was made in the skin with a surgical scalpel and sharp dissection was carried out over subsequent layers of tissue including the fascia, followed by the Bovie electrocautery for hemostasis.  The fascia was incised at the midline and the fascial incision was extended bilaterally using the curved Leger scissors.      The superior edge of the fascial incision was grasped with Kocher clamps, tented up and the underlying rectus muscles were dissected off bluntly and sharply using the scalpel. The rectus muscles were then divided at midline and the peritoneum was identified, tented up at its upper margin taking care to avoid the bladder, and then entered.  The peritoneal incision was extended superiorly and inferiorly.  The Eric retractor was inserted and the vesicouterine peritoneum was identified, grasped with forceps and cut laterally to both sides using the  Metzenbaum scissors. Then, a transverse incision was made in the lower uterine segment using a new surgical blade.  The uterine incision was extended cephalad and caudal using blunt dissection.  The amniotic sac was entered and the amniotic fluid was noted to be clear.    The surgeon's hand was placed into the uterine cavity. The fetal vertex was noted to be significantly asynclitic. The fetal head was identified and elevated through the uterine incision with the assistance of fundal pressure. With gentle traction, the shoulder was delivered, followed by the rest of the fetal body. There was no nuchal cord noted. On delivery the cord was doubly clamped and cut after delayed cord clamping.  The infant was then passed off the table to the awaiting  staff. The  was noted to cry spontaneously and moved all extremities. Venous and arterial blood gas, cord blood, and portion of cord was obtained for analysis and routine blood testing.  The placenta delivery was then sent to pathology. Placenta was noted to be intact with a centrally inserted three-vessel cord.  Oxytocin was administered by IV infusion to enhance uterine contraction, as well as IV TXA.  The uterus was cleared of all clots and remaining products of conception.      The uterine incision was re approximated using a 0 Vicryl in a running locked fashion. A superficial, but oozing defect was noted in the posterior uterine corpus; this did not extend to the uterine serosa. The endometrium was oversewn to obtain hemostasis, and then the remainder of the hysterotomy was closed. Further, a 4 cm extension was noted inferiorly towards the cervix, which was also closed using running, locked 0 Vicryl. A second vertical imbricating stitch with 0 Monocryl was applied.  The uterine incision was examined and noted to be hemostatic.  The posterior cul-de-sac was cleared of all clots and products of conception.  The uterus was replaced into the abdomen and the  pericolic gutters were cleared of all clots.  The uterine incision was once again reexamined and noted to be hemostatic.  The fascia was re approximated using 0 Vicryl in a running nonlocked fashion. The subcutaneous tissue was irrigated and cleared of all clots and debris.  Good hemostasis was noted with Bovie electrocautery. The subcutaneous  tissue was reapproximated with running plain suture.  The skin incision was closed using 4-0 Monocryl with Steri Strips placed over top.  Good hemostasis was noted.  Patient tolerated the procedure well.  All needle, sponge, and instrument counts were noted to be correct x 2 at the end of the procedure.  Patient was transferred to the recovery room in stable condition.    I was present for the entire procedure.    Patient Disposition:  PACU         SIGNATURE: Olena Eckert MD  DATE: November 1, 2024  TIME: 2:41 AM

## 2024-11-01 NOTE — TELEPHONE ENCOUNTER
Attempted to call patient to schedule 1 week post partum incision and BP check per Dr. Eckert. Unable to reach patient by number in her chart. Sending a [employer] message to patient to call the office to schedule.

## 2024-11-01 NOTE — ANESTHESIA POSTPROCEDURE EVALUATION
Post-Op Assessment Note    CV Status:  Stable    Pain management: adequate      Post-op block assessment: catheter intact and no complications   Mental Status:  Alert and awake   Hydration Status:  Euvolemic   PONV Controlled:  Controlled   Airway Patency:  Patent     Post Op Vitals Reviewed: Yes    No anethesia notable event occurred.    Staff: Anesthesiologist, CRNA           Last Filed PACU Vitals:  Vitals Value Taken Time   Temp 98.6 °F (37 °C) 11/01/24 0600   Pulse 84 11/01/24 0600   /86 11/01/24 0600   Resp 18 11/01/24 0600   SpO2 97 % 11/01/24 0600       Modified Esther:  No data recorded

## 2024-11-01 NOTE — OB LABOR/OXYTOCIN SAFETY PROGRESS
Oxytocin Safety Progress Check Note - Mone Garces 29 y.o. female MRN: [id number]    Unit/Bed#: -01 Encounter: [id number]    Dose (courtney-units/min) Oxytocin: 8 courtney-units/min  Contraction Frequency (minutes): 2  Contraction Intensity: Mild/Moderate  Uterine Activity Characteristics: Regular  Cervical Dilation: 5        Cervical Effacement: 70  Fetal Station: -2  Baseline Rate (FHR): 140 bpm  Fetal Heart Rate (FHT): 175 BPM  FHR Category: 2               Vital Signs:   Vitals:    10/31/24 2053   BP: 150/85   Pulse: 69   Resp:    Temp:        Notes/comments:   Large forebag ruptured. FHT with intermittent variable decelerations after rupture. SVE as above, some effacement. Difficulty monitoring FHR, FSE applied to better monitor. Continue pitocin titration.     Olena Eckert MD 10/31/2024 9:50 PM

## 2024-11-01 NOTE — PLAN OF CARE
Problem: BIRTH - VAGINAL/ SECTION  Goal: Fetal and maternal status remain reassuring during the birth process  Description: INTERVENTIONS:  - Monitor vital signs  - Monitor fetal heart rate  - Monitor uterine activity  - Monitor labor progression (vaginal delivery)  - DVT prophylaxis  - Antibiotic prophylaxis  Outcome: Completed  Goal: Emotionally satisfying birthing experience for mother/fetus  Description: Interventions:  - Assess, plan, implement and evaluate the nursing care given to the patient in labor  - Advocate the philosophy that each childbirth experience is a unique experience and support the family's chosen level of involvement and control during the labor process   - Actively participate in both the patient's and family's teaching of the birth process  - Consider cultural, Jehovah's witness and age-specific factors and plan care for the patient in labor  Outcome: Completed     Problem: Knowledge Deficit  Goal: Verbalizes understanding of labor plan  Description: Assess patient/family/caregiver's baseline knowledge level and ability to understand information.  Provide education via patient/family/caregiver's preferred learning method at appropriate level of understanding.     1. Provide teaching at level of understanding.  2. Provide teaching via preferred learning method(s).  Outcome: Progressing  Goal: Patient/family/caregiver demonstrates understanding of disease process, treatment plan, medications, and discharge instructions  Description: Complete learning assessment and assess knowledge base.  Interventions:  - Provide teaching at level of understanding  - Provide teaching via preferred learning methods  Outcome: Progressing     Problem: Labor & Delivery  Goal: Manages discomfort  Description: Assess and monitor for signs and symptoms of discomfort.  Assess patient's pain level regularly and per hospital policy.  Administer medications as ordered. Support use of nonpharmacological methods to  help control pain such as distraction, imagery, relaxation, and application of heat and cold.  Collaborate with interdisciplinary team and patient to determine appropriate pain management plan.    1. Include patient in decisions related to comfort.  2. Offer non-pharmacological pain management interventions.  3. Report ineffective pain management to physician.  Outcome: Completed  Goal: Patient vital signs are stable  Description: 1. Assess vital signs - vaginal delivery.  Outcome: Completed     Problem: PAIN - ADULT  Goal: Verbalizes/displays adequate comfort level or baseline comfort level  Description: Interventions:  - Encourage patient to monitor pain and request assistance  - Assess pain using appropriate pain scale  - Administer analgesics based on type and severity of pain and evaluate response  - Implement non-pharmacological measures as appropriate and evaluate response  - Consider cultural and social influences on pain and pain management  - Notify physician/advanced practitioner if interventions unsuccessful or patient reports new pain  Outcome: Progressing     Problem: INFECTION - ADULT  Goal: Absence or prevention of progression during hospitalization  Description: INTERVENTIONS:  - Assess and monitor for signs and symptoms of infection  - Monitor lab/diagnostic results  - Monitor all insertion sites, i.e. indwelling lines, tubes, and drains  - Monitor endotracheal if appropriate and nasal secretions for changes in amount and color  - [place] appropriate cooling/warming therapies per order  - Administer medications as ordered  - Instruct and encourage patient and family to use good hand hygiene technique  - Identify and instruct in appropriate isolation precautions for identified infection/condition  Outcome: Progressing  Goal: Absence of fever/infection during neutropenic period  Description: INTERVENTIONS:  - Monitor WBC    Outcome: Progressing     Problem: SAFETY ADULT  Goal: Patient will remain  free of falls  Description: INTERVENTIONS:  - Educate patient/family on patient safety including physical limitations  - Instruct patient to call for assistance with activity   - Consult OT/PT to assist with strengthening/mobility   - Keep Call bell within reach  - Keep bed low and locked with side rails adjusted as appropriate  - Keep care items and personal belongings within reach  - Initiate and maintain comfort rounds  - Make Fall Risk Sign visible to staff  - Apply yellow socks and bracelet for high fall risk patients  - Consider moving patient to room near nurses station  Outcome: Progressing  Goal: Maintain or return to baseline ADL function  Description: INTERVENTIONS:  -  Assess patient's ability to carry out ADLs; assess patient's baseline for ADL function and identify physical deficits which impact ability to perform ADLs (bathing, care of mouth/teeth, toileting, grooming, dressing, etc.)  - Assess/evaluate cause of self-care deficits   - Assess range of motion  - Assess patient's mobility; develop plan if impaired  - Assess patient's need for assistive devices and provide as appropriate  - Encourage maximum independence but intervene and supervise when necessary  - Involve family in performance of ADLs  - Assess for home care needs following discharge   - Consider OT consult to assist with ADL evaluation and planning for discharge  - Provide patient education as appropriate  Outcome: Progressing  Goal: Maintains/Returns to pre admission functional level  Description: INTERVENTIONS:  - Perform AM-PAC 6 Click Basic Mobility/ Daily Activity assessment daily.  - Set and communicate daily mobility goal to care team and patient/family/caregiver.   - Collaborate with rehabilitation services on mobility goals if consulted  - Out of bed for toileting  - Record patient progress and toleration of activity level   Outcome: Progressing     Problem: DISCHARGE PLANNING  Goal: Discharge to home or other facility with  appropriate resources  Description: INTERVENTIONS:  - Identify barriers to discharge w/patient and caregiver  - Arrange for needed discharge resources and transportation as appropriate  - Identify discharge learning needs (meds, wound care, etc.)  - Arrange for interpretive services to assist at discharge as needed  - Refer to Case Management Department for coordinating discharge planning if the patient needs post-hospital services based on physician/advanced practitioner order or complex needs related to functional status, cognitive ability, or social support system  Outcome: Progressing

## 2024-11-01 NOTE — DISCHARGE SUMMARY
Discharge Summary - Mone Garces 29 y.o. female MRN: [id number]    Unit/Bed#: -01 Encounter: [id number]    ADMISSION  Admission Date: 10/31/2024   Admitting Attending: Dr. Olena Eckert MD  Admitting Diagnoses:   Patient Active Problem List   Diagnosis    Obesity (BMI 30-39.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 of cervix    Migraine without aura and without status migrainosus, not intractable    Chronic midline low back pain without sciatica    Chronic fatigue    Dry eyes    Vitamin D deficiency    Nephrocalcinosis    Anxiety    Myofascial pain    Gestational diabetes    Positive GBS test    38 weeks gestation    Short interval between pregnancies affecting pregnancy, antepartum    Hx of preeclampsia, prior pregnancy, currently pregnant    GBS bacteriuria    Susceptible to varicella (non-immune), currently pregnant    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    History of gestational diabetes in prior pregnancy, currently pregnant in third trimester    Elevated glucose tolerance test    Obesity complicating pregnancy    Class 2 obesity due to excess calories without serious comorbidity with body mass index (BMI) of 37.0 to 37.9 in adult    Macrosomia affecting management of mother in third trimester    Pre-eclampsia in third trimester    Pregnancy complicated by fetal abdominal abnormality    Anemia       DELIVERY  Delivery Method: , Low Transverse   Delivery Date and Time: 2024 1:51 AM  Delivery Attending: Olena Eckert    DISCHARGE  Discharge Date: 11/3/24  Discharge Attending: Dr. Suni Hernandez  Discharge Diagnosis:   Same, Delivered  Clinical course: Admission to Delivery  Patient is a 28 yo  at 38w3d who presented for an induction of labor for poorly controlled gestational diabetes and newly diagnosed preeclampsia without severe features. There was also suspected fetal macrosomia, with an estimated AC >99%. She also met criteria for preeclampsia  without severe features. Her induction was started with pitocin, and she was artificially ruptured for clear fluid. She received an epidural for analgesia. She progressed to complete dilation and began to push, although fetal station was noted to be quite high. After pushing for approximately 20 minutes with no fetal descent, patient requested a  section due to lack of progress and fatigue. Patient was offered rest and placement on a peanut ball to encourage fetal rotation and descent, but after a short rest, patient again requested a  section. Delivery was complicated by a post partum hemorrhage of 1028 cc    Reason for induction: Preeclampsia without severe features     Delivery  Route of Delivery: , Low Transverse  Reason for  delivery: Elective/Maternal Request      Anesthesia: Epidural,   QBL: 1028 cc    Delivery: , Low Transverse at 2024 1:51 AM    Baby's Weight: 3240 g (7 lb 2.3 oz); 114.29    Apgar scores: 8  and 9  at 1 and 5 minutes, respectively      Clinical Course: Post-Delivery:  The post delivery course was unremarkable.    On the day of discharge, the patient was ambulating, voiding spontaneously, tolerating oral intake, and hemodynamically stable. She was able to reasonably perform all ADLs. She had appropriate bowel function. Pain was well-controlled. She was discharged home on postpartum/postop day #2 without complications. Patient was instructed to follow up with her OB as an outpatient and was given appropriate warnings to call her provider with problems or concerns.    Pertinent lab findings included:   Blood type B positive.     Last three Hgb values:  Lab Results   Component Value Date    HGB 10.1 (L) 10/31/2024    HGB 10.9 (L) 2024    HGB 10.6 (L) 2024        Problem-specific follow-up plans included the following:  Problem List       Obesity (BMI 30-39.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 of cervix    Overview     Repeat pap smear [ ]          Migraine without aura and without status migrainosus, not intractable    Chronic midline low back pain without sciatica    Chronic fatigue    Dry eyes    Vitamin D deficiency    Nephrocalcinosis    Anxiety    Myofascial pain    Gestational diabetes    Current Assessment & Plan     Lab Results   Component Value Date    HGBA1C 4.9 2022   Repeat hgb A1c collected on admission    Recent Labs     10/31/24  1437   POCGLU 114   Will monitor blood sugar q1 hour and start insulin drip if persistently elevated         Positive GBS test    Overview     Needs PCN in labor         * (Principal) S/P  section    Overview     LDASA   MSAFP wnl  Delivery consent [x]  Tdap [x]  Needs to complete 3 hour GTT  Would like to have a Nexplanon         Current Assessment & Plan     Admit for IOL via pitocin titration  Analgesia at maternal request  Continuous fetal monitoring  Clear liquid diet  EFW 8#3, AC>99%, BPD 31% (pelvis proven to 7#10.8)  Anticipate          Short interval between pregnancies affecting pregnancy, antepartum    Current Assessment & Plan      12/3/23         Hx of preeclampsia, prior pregnancy, currently pregnant    Overview     LDASA          GBS bacteriuria    Overview     PCN during labor         Current Assessment & Plan     PCN ordered for intrapartum ppx         Susceptible to varicella (non-immune), currently pregnant    Overview     Varivax postpartum         Current Assessment & Plan     Will offer varivax postpartum         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  On 2022 prior to her second pregnancy she had anti-K, anti-M and anti-S identified but were not titered because she was not pregnant.  Then in her last pregnancy  in 2023 her antibody screen returned as negative on 2 separate occasions including at delivery.  On 4/16/2024 in this pregnancy only anti-M antibody was found on her initial blood work. Her partner Hari is a carrier for the M antigen and is the father of her other children.  Antibody titer 9/16/24 shows no antibodies.     Recommend checking for monthly antibodies and antibody titer for anti K, anti M and Anti S. Please repeat 10/31/24 while in L/D           Current Assessment & Plan     Type and screen collected and pending         History of gestational diabetes in prior pregnancy, currently pregnant in third trimester    Elevated glucose tolerance test    Overview     170 but did not complete her 3hr ogtt by 38 weeks.  Please check a fasting blood sugar and 2-hour postprandial blood sugars while on labor and delivery.         Obesity complicating pregnancy    Class 2 obesity due to excess calories without serious comorbidity with body mass index (BMI) of 37.0 to 37.9 in adult    Macrosomia affecting management of mother in third trimester    Overview     Based on the AC greater than 99th percentile and is measuring 4 cm larger than the fetal head at 38 weeks and 2 days.  Overall estimated fetal weight is 84%.  Patient is aware she is at risk for shoulder dystocia.          Pre-eclampsia in third trimester    Overview     Suspected at her 38-week ultrasound appointment-sent to labor and delivery         Current Assessment & Plan     Mildly elevated BP >4 hours apart  CBC/CMP wnl except hgb 10.1, P:C 0.4  Will continue to monitor BP closely         Pregnancy complicated by fetal abdominal abnormality    Overview     At 38 weeks the fetal abdomen shows that the meconium in the transverse colon appears more echogenic than normal.  There is no evidence for dilated loops of bowel or fluid-filled bowel or calcifications in the abdomen.  May be normal variant. CF screen is neg in the past. Recommend notifying peds to  assess postnatally.          Current Assessment & Plan     Echogenic meconium in fetus -> NICU to eval after delivery         Anemia     Other Visit Diagnoses       Insufficient prenatal care in third trimester                 Discharge med list:  Contraception: Nexplanon     Medication List      ASK your doctor about these medications     escitalopram 20 mg tablet; Commonly known as: LEXAPRO; TAKE 1 TABLET BY   MOUTH EVERY DAY   ferrous sulfate 324 (65 Fe) mg; Take 1 tablet (324 mg total) by mouth 2   (two) times a day before meals   metoclopramide 10 mg tablet; Commonly known as: Reglan; Take 1 tablet   (10 mg total) by mouth 4 (four) times a day   Trinatal Rx 1 60-1 MG Tabs; Take 1 tablet by mouth daily       Condition at discharge:   good     Disposition:   See After Visit Summary for discharge disposition information.    Planned Readmission:   No    Elizabeth Francisco MD  PGY-2 OBGYN

## 2024-11-01 NOTE — ANESTHESIA POSTPROCEDURE EVALUATION
"Post-Op Assessment Note    CV Status:  Stable    Pain management: adequate       Mental Status:  Alert and awake   Hydration Status:  Euvolemic   PONV Controlled:  Controlled   Airway Patency:  Patent     Post Op Vitals Reviewed: Yes    No anethesia notable event occurred.    Staff: CRNA           Last Filed PACU Vitals:  Vitals Value Taken Time   Temp     Pulse     BP     Resp     SpO2         Modified Esther:  No data recorded  /75   Pulse 90   Temp 98 °F (36.7 °C) (Oral)   Resp 20   Ht 4' 11\" (1.499 m)   Wt 91.4 kg (201 lb 6.4 oz)   LMP 01/10/2024 (Approximate)   Breastfeeding Unknown   BMI 40.68 kg/m²       "

## 2024-11-01 NOTE — OB LABOR/OXYTOCIN SAFETY PROGRESS
Oxytocin Safety Progress Check Note - Mone Garces 29 y.o. female MRN: [id number]    Unit/Bed#: -01 Encounter: [id number]    Dose (courtney-units/min) Oxytocin: 8 courtney-units/min  Contraction Frequency (minutes): 2-3  Contraction Intensity: Mild/Moderate  Uterine Activity Characteristics: Regular  Cervical Dilation: 10  Dilation Complete Date: 10/31/24  Dilation Complete Time: 2338  Cervical Effacement: 100  Fetal Station: -1  Baseline Rate (FHR): 145 bpm  Fetal Heart Rate (FHT): 175 BPM  FHR Category: 2               Vital Signs:   Vitals:    24 0008   BP: 129/75   Pulse: 90   Resp:    Temp:        Notes/comments:   No fetal descent after approximately 20 minutes of pushing, fetal descent still quite high. Patient reports becoming fatigued, interested in  section due to lack of progress and fatigue. Discussed with patient risks of  section, including bleeding, infection, injury to surrounding organs, and abnormal placentation in future pregnancies. Patient expressed understanding. Discussed with patient trying to take a short break from pushing, and utilizing the peanut ball to encourage fetal descent. Patient agreeable to plan, but requests  section if no descent.     Olena Eckert MD 2024 12:19 AM

## 2024-11-01 NOTE — TELEPHONE ENCOUNTER
----- Message from Olena Eckert MD sent at 11/1/2024  2:53 AM EDT -----  Regarding: appt  Please schedule patient for a BP check and incision check with me next week    Thanks!  Olena

## 2024-11-01 NOTE — OB LABOR/OXYTOCIN SAFETY PROGRESS
Oxytocin Safety Progress Check Note - Mone Garces 29 y.o. female MRN: [id number]    Unit/Bed#: -01 Encounter: [id number]    Dose (courtney-units/min) Oxytocin: 8 courtney-units/min  Contraction Frequency (minutes): 2-3  Contraction Intensity: Mild/Moderate  Uterine Activity Characteristics: Regular  Cervical Dilation: 10  Dilation Complete Date: 10/31/24  Dilation Complete Time: 2338  Cervical Effacement: 100  Fetal Station: -1  Baseline Rate (FHR): 145 bpm  Fetal Heart Rate (FHT): 175 BPM  FHR Category: 2               Vital Signs:   Vitals:    10/31/24 2321   BP: 152/80   Pulse: 87   Resp:    Temp:        Notes/comments:   SVE as above, completely dilated, although high fetal station; discussed with patient that this may be due to suspected fetal macrosomia. FHT reassuring with moderate variability and accelerations, occasional variable deceleration. Will begin pushing.     Olena Eckert MD 10/31/2024 11:38 PM

## 2024-11-02 PROBLEM — R58 HEMORRHAGE: Status: ACTIVE | Noted: 2024-11-02

## 2024-11-02 PROBLEM — O14.90 PREECLAMPSIA: Status: ACTIVE | Noted: 2024-11-02

## 2024-11-02 PROBLEM — Z98.891 S/P EMERGENCY CESAREAN SECTION: Status: ACTIVE | Noted: 2024-11-02

## 2024-11-02 LAB
ALBUMIN SERPL BCG-MCNC: 3.1 G/DL (ref 3.5–5)
ALP SERPL-CCNC: 101 U/L (ref 34–104)
ALT SERPL W P-5'-P-CCNC: 7 U/L (ref 7–52)
ANION GAP SERPL CALCULATED.3IONS-SCNC: 5 MMOL/L (ref 4–13)
AST SERPL W P-5'-P-CCNC: 16 U/L (ref 13–39)
BILIRUB SERPL-MCNC: 0.21 MG/DL (ref 0.2–1)
BUN SERPL-MCNC: 11 MG/DL (ref 5–25)
CALCIUM ALBUM COR SERPL-MCNC: 9.6 MG/DL (ref 8.3–10.1)
CALCIUM SERPL-MCNC: 8.9 MG/DL (ref 8.4–10.2)
CHLORIDE SERPL-SCNC: 104 MMOL/L (ref 96–108)
CO2 SERPL-SCNC: 26 MMOL/L (ref 21–32)
CREAT SERPL-MCNC: 0.62 MG/DL (ref 0.6–1.3)
ERYTHROCYTE [DISTWIDTH] IN BLOOD BY AUTOMATED COUNT: 14.1 % (ref 11.6–15.1)
GFR SERPL CREATININE-BSD FRML MDRD: 123 ML/MIN/1.73SQ M
GLUCOSE SERPL-MCNC: 109 MG/DL (ref 65–140)
HCT VFR BLD AUTO: 27 % (ref 34.8–46.1)
HGB BLD-MCNC: 8.8 G/DL (ref 11.5–15.4)
MCH RBC QN AUTO: 26.8 PG (ref 26.8–34.3)
MCHC RBC AUTO-ENTMCNC: 32.6 G/DL (ref 31.4–37.4)
MCV RBC AUTO: 82 FL (ref 82–98)
PLATELET # BLD AUTO: 261 THOUSANDS/UL (ref 149–390)
PMV BLD AUTO: 10.8 FL (ref 8.9–12.7)
POTASSIUM SERPL-SCNC: 4.1 MMOL/L (ref 3.5–5.3)
PROT SERPL-MCNC: 6.2 G/DL (ref 6.4–8.4)
RBC # BLD AUTO: 3.28 MILLION/UL (ref 3.81–5.12)
SODIUM SERPL-SCNC: 135 MMOL/L (ref 135–147)
WBC # BLD AUTO: 12.76 THOUSAND/UL (ref 4.31–10.16)

## 2024-11-02 PROCEDURE — 85027 COMPLETE CBC AUTOMATED: CPT

## 2024-11-02 PROCEDURE — 99024 POSTOP FOLLOW-UP VISIT: CPT | Performed by: OBSTETRICS & GYNECOLOGY

## 2024-11-02 PROCEDURE — 80053 COMPREHEN METABOLIC PANEL: CPT

## 2024-11-02 RX ORDER — KETOROLAC TROMETHAMINE 30 MG/ML
30 INJECTION, SOLUTION INTRAMUSCULAR; INTRAVENOUS EVERY 6 HOURS SCHEDULED
Status: COMPLETED | OUTPATIENT
Start: 2024-11-02 | End: 2024-11-02

## 2024-11-02 RX ORDER — HYDROXYZINE HYDROCHLORIDE 25 MG/1
25 TABLET, FILM COATED ORAL EVERY 6 HOURS PRN
Status: DISCONTINUED | OUTPATIENT
Start: 2024-11-02 | End: 2024-11-03 | Stop reason: HOSPADM

## 2024-11-02 RX ORDER — HYDROMORPHONE HCL/PF 1 MG/ML
1 SYRINGE (ML) INJECTION EVERY 2 HOUR PRN
Status: DISCONTINUED | OUTPATIENT
Start: 2024-11-02 | End: 2024-11-03 | Stop reason: HOSPADM

## 2024-11-02 RX ORDER — IBUPROFEN 600 MG/1
600 TABLET, FILM COATED ORAL EVERY 6 HOURS
Status: DISCONTINUED | OUTPATIENT
Start: 2024-11-02 | End: 2024-11-03 | Stop reason: HOSPADM

## 2024-11-02 RX ADMIN — FUROSEMIDE 20 MG: 20 TABLET ORAL at 08:11

## 2024-11-02 RX ADMIN — ACETAMINOPHEN 650 MG: 325 TABLET, FILM COATED ORAL at 02:28

## 2024-11-02 RX ADMIN — KETOROLAC TROMETHAMINE 30 MG: 30 INJECTION, SOLUTION INTRAMUSCULAR; INTRAVENOUS at 05:24

## 2024-11-02 RX ADMIN — ACETAMINOPHEN 650 MG: 325 TABLET, FILM COATED ORAL at 13:36

## 2024-11-02 RX ADMIN — ACETAMINOPHEN 650 MG: 325 TABLET, FILM COATED ORAL at 20:15

## 2024-11-02 RX ADMIN — ESCITALOPRAM OXALATE 20 MG: 10 TABLET ORAL at 08:11

## 2024-11-02 RX ADMIN — DOCUSATE SODIUM 100 MG: 100 CAPSULE, LIQUID FILLED ORAL at 08:11

## 2024-11-02 RX ADMIN — NIFEDIPINE 30 MG: 30 TABLET, FILM COATED, EXTENDED RELEASE ORAL at 08:11

## 2024-11-02 RX ADMIN — ENOXAPARIN SODIUM 40 MG: 40 INJECTION SUBCUTANEOUS at 08:11

## 2024-11-02 RX ADMIN — IBUPROFEN 600 MG: 600 TABLET ORAL at 18:25

## 2024-11-02 RX ADMIN — HYDROXYZINE HYDROCHLORIDE 25 MG: 25 TABLET ORAL at 13:45

## 2024-11-02 RX ADMIN — DOCUSATE SODIUM 100 MG: 100 CAPSULE, LIQUID FILLED ORAL at 18:26

## 2024-11-02 RX ADMIN — ACETAMINOPHEN 650 MG: 325 TABLET, FILM COATED ORAL at 08:11

## 2024-11-02 RX ADMIN — KETOROLAC TROMETHAMINE 30 MG: 30 INJECTION, SOLUTION INTRAMUSCULAR; INTRAVENOUS at 11:24

## 2024-11-02 NOTE — ASSESSMENT & PLAN NOTE
Mildly elevated BP >4 hours apart  CBC/CMP wnl except hgb 10.1, P:C 0.4  BP largely normal in last 12 hours:   - Systolic (12hrs), Av , Min:114 , Max:123   - Diastolic (12hrs), Av, Min:73, Max:91  Continue to monitor

## 2024-11-02 NOTE — PROGRESS NOTES
Obstetrics Progress Note  Mone Garcse 29 y.o. female MRN: [id number]  Unit/Bed#:  409-01 Encounter: [id number]    Assessment/Plan:    Postpartum Day #1 s/p 1LTCS (maternal request) delivery complicated by postpartum hemorrhage (QBL 1028 mL), currently stable. Baby in room. By issue:    Postpartum hemorrhage  Assessment & Plan  QBL 1028 cc s/p extra pit, TXA  HgB 10.1 > 8.5 > venofer > 8.8  HgB stable, continue to monitor for s/sx of ABLA    Preeclampsia  Assessment & Plan  Mildly elevated BP >4 hours apart  CBC/CMP wnl except hgb 10.1, P:C 0.4  BP largely normal in last 12 hours:   - Systolic (12hrs), Av , Min:114 , Max:123   - Diastolic (12hrs), Av, Min:73, Max:91  Continue to monitor     Gestational diabetes  Assessment & Plan  Lab Results   Component Value Date    HGBA1C 5.9 (H) 10/31/2024   Repeat hgb A1c collected on admission  F/u outpatient      * S/P  section  Assessment & Plan  QBL 1028 cc s/p extra pit, TXA  S/p venofer on POD#0         Results from last 7 days   Lab Units 24  0509 24  0933 10/31/24  1414   HEMOGLOBIN g/dL 8.8* 8.5* 10.1*   HEMATOCRIT % 27.0* 26.6* 31.1*      Lines: tovar in place   Pain: Tylenol and toradol scheduled, garima 5/10 PRN    FEN: Tolerating regular diet  DVT ppx: SCDs and  Lovenox 40mg qD  Passing flatus   Incision C/D/I          Subjective/Objective     Subjective:   No acute events overnight. Pain: controlled. Tolerating PO: yes. Voiding: yes. Flatus: passing. Ambulating: yes. Chest pain: no. Shortness of breath: no. Leg pain: no. Lochia: within normal limits. Baby in room, feeding via bottle.    Objective:   Vitals:   Temp:  [97.5 °F (36.4 °C)-98.6 °F (37 °C)] 98.4 °F (36.9 °C)  HR:  [79-95] 82  BP: (114-156)/(73-97) 115/73  Resp:  [16-18] 18  SpO2:  [94 %-99 %] 98 %  O2 Device: None (Room air)       Intake/Output Summary (Last 24 hours) at 2024 1139  Last data filed at 2024 1854  Gross per 24 hour   Intake --    Output 650 ml   Net -650 ml       Lab Results   Component Value Date    WBC 12.76 (H) 11/02/2024    HGB 8.8 (L) 11/02/2024    HCT 27.0 (L) 11/02/2024    MCV 82 11/02/2024     11/02/2024       Physical Exam:   General: alert and oriented x3, in no apparent distress  Cardiovascular: regular rate and rhythm  Pulmonary: normal effort, CTAB  Abdomen: Soft, non-tender, non-distended, no rebound or guarding. Uterine fundus firm and non-tender, at the umbilicus  Incision: clean/dry/intact  Extremities: Non tender with no edema    Elizabeth Francisco MD  OBGYN PGY2  11/2/2024, 11:39 AM

## 2024-11-02 NOTE — ASSESSMENT & PLAN NOTE
QBL 1028 cc s/p extra pit, TXA  HgB 10.1 > 8.5 > venofer > 8.8  HgB stable, continue to monitor for s/sx of ABLA

## 2024-11-02 NOTE — ASSESSMENT & PLAN NOTE
>>ASSESSMENT AND PLAN FOR S/P  SECTION WRITTEN ON 2024 11:37 AM BY INA ORTEGA MD    QBL 1028 cc s/p extra pit, TXA  S/p venofer on POD#0         Results from last 7 days   Lab Units 24  0509 24  0933 10/31/24  1414   HEMOGLOBIN g/dL 8.8* 8.5* 10.1*   HEMATOCRIT % 27.0* 26.6* 31.1*      Lines: tovar in place   Pain: Tylenol and toradol scheduled, garima 5/10 PRN    FEN: Tolerating regular diet  DVT ppx: SCDs and  Lovenox 40mg qD  Passing flatus   Incision C/D/I

## 2024-11-02 NOTE — PLAN OF CARE
Problem: PAIN - ADULT  Goal: Verbalizes/displays adequate comfort level or baseline comfort level  Description: Interventions:  - Encourage patient to monitor pain and request assistance  - Assess pain using appropriate pain scale  - Administer analgesics based on type and severity of pain and evaluate response  - Implement non-pharmacological measures as appropriate and evaluate response  - Consider cultural and social influences on pain and pain management  - Notify physician/advanced practitioner if interventions unsuccessful or patient reports new pain  Outcome: Progressing     Problem: INFECTION - ADULT  Goal: Absence or prevention of progression during hospitalization  Description: INTERVENTIONS:  - Assess and monitor for signs and symptoms of infection  - Monitor lab/diagnostic results  - Monitor all insertion sites, i.e. indwelling lines, tubes, and drains  - Monitor endotracheal if appropriate and nasal secretions for changes in amount and color  - [place] appropriate cooling/warming therapies per order  - Administer medications as ordered  - Instruct and encourage patient and family to use good hand hygiene technique  - Identify and instruct in appropriate isolation precautions for identified infection/condition  Outcome: Progressing  Goal: Absence of fever/infection during neutropenic period  Description: INTERVENTIONS:  - Monitor WBC    Outcome: Progressing     Problem: SAFETY ADULT  Goal: Patient will remain free of falls  Description: INTERVENTIONS:  - Educate patient/family on patient safety including physical limitations  - Instruct patient to call for assistance with activity   - Consult OT/PT to assist with strengthening/mobility   - Keep Call bell within reach  - Keep bed low and locked with side rails adjusted as appropriate  - Keep care items and personal belongings within reach  - Initiate and maintain comfort rounds  - Make Fall Risk Sign visible to staff  - Offer Toileting every  Hours,  in advance of need  - Initiate/Maintain alarm  - Obtain necessary fall risk management equipment:   - Apply yellow socks and bracelet for high fall risk patients  - Consider moving patient to room near nurses station  Outcome: Progressing  Goal: Maintain or return to baseline ADL function  Description: INTERVENTIONS:  -  Assess patient's ability to carry out ADLs; assess patient's baseline for ADL function and identify physical deficits which impact ability to perform ADLs (bathing, care of mouth/teeth, toileting, grooming, dressing, etc.)  - Assess/evaluate cause of self-care deficits   - Assess range of motion  - Assess patient's mobility; develop plan if impaired  - Assess patient's need for assistive devices and provide as appropriate  - Encourage maximum independence but intervene and supervise when necessary  - Involve family in performance of ADLs  - Assess for home care needs following discharge   - Consider OT consult to assist with ADL evaluation and planning for discharge  - Provide patient education as appropriate  Outcome: Progressing  Goal: Maintains/Returns to pre admission functional level  Description: INTERVENTIONS:  - Perform AM-PAC 6 Click Basic Mobility/ Daily Activity assessment daily.  - Set and communicate daily mobility goal to care team and patient/family/caregiver.   - Collaborate with rehabilitation services on mobility goals if consulted  - Perform Range of Motion  times a day.  - Reposition patient every  hours.  - Dangle patient  times a day  - Stand patient  times a day  - Ambulate patient  times a day  - Out of bed to chair  times a day   - Out of bed for meals  times a day  - Out of bed for toileting  - Record patient progress and toleration of activity level   Outcome: Progressing     Problem: DISCHARGE PLANNING  Goal: Discharge to home or other facility with appropriate resources  Description: INTERVENTIONS:  - Identify barriers to discharge w/patient and caregiver  - Arrange for  needed discharge resources and transportation as appropriate  - Identify discharge learning needs (meds, wound care, etc.)  - Arrange for interpretive services to assist at discharge as needed  - Refer to Case Management Department for coordinating discharge planning if the patient needs post-hospital services based on physician/advanced practitioner order or complex needs related to functional status, cognitive ability, or social support system  Outcome: Progressing     Problem: POSTPARTUM  Goal: Experiences normal postpartum course  Description: INTERVENTIONS:  - Monitor maternal vital signs  - Assess uterine involution and lochia  Outcome: Progressing  Goal: Appropriate maternal -  bonding  Description: INTERVENTIONS:  - Identify family support  - Assess for appropriate maternal/infant bonding   -Encourage maternal/infant bonding opportunities  - Referral to  or  as needed  Outcome: Progressing  Goal: Establishment of infant feeding pattern  Description: INTERVENTIONS:  - Assess breast/bottle feeding  - Refer to lactation as needed  Outcome: Progressing  Goal: Incision(s), wounds(s) or drain site(s) healing without S/S of infection  Description: INTERVENTIONS  - Assess and document dressing, incision, wound bed, drain sites and surrounding tissue  - Provide patient and family education  - Perform skin care/dressing changes every   Outcome: Progressing

## 2024-11-02 NOTE — PLAN OF CARE
Problem: PAIN - ADULT  Goal: Verbalizes/displays adequate comfort level or baseline comfort level  Description: Interventions:  - Encourage patient to monitor pain and request assistance  - Assess pain using appropriate pain scale  - Administer analgesics based on type and severity of pain and evaluate response  - Implement non-pharmacological measures as appropriate and evaluate response  - Consider cultural and social influences on pain and pain management  - Notify physician/advanced practitioner if interventions unsuccessful or patient reports new pain  Outcome: Progressing     Problem: INFECTION - ADULT  Goal: Absence or prevention of progression during hospitalization  Description: INTERVENTIONS:  - Assess and monitor for signs and symptoms of infection  - Monitor lab/diagnostic results  - Monitor all insertion sites, i.e. indwelling lines, tubes, and drains  - Monitor endotracheal if appropriate and nasal secretions for changes in amount and color  - [place] appropriate cooling/warming therapies per order  - Administer medications as ordered  - Instruct and encourage patient and family to use good hand hygiene technique  - Identify and instruct in appropriate isolation precautions for identified infection/condition  Outcome: Progressing  Goal: Absence of fever/infection during neutropenic period  Description: INTERVENTIONS:  - Monitor WBC    Outcome: Progressing     Problem: SAFETY ADULT  Goal: Patient will remain free of falls  Description: INTERVENTIONS:  - Educate patient/family on patient safety including physical limitations  - Instruct patient to call for assistance with activity   - Consult OT/PT to assist with strengthening/mobility   - Keep Call bell within reach  - Keep bed low and locked with side rails adjusted as appropriate  - Keep care items and personal belongings within reach  - Initiate and maintain comfort rounds  - Make Fall Risk Sign visible to staff  - Apply yellow socks and bracelet  for high fall risk patients  - Consider moving patient to room near nurses station  Outcome: Progressing  Goal: Maintain or return to baseline ADL function  Description: INTERVENTIONS:  -  Assess patient's ability to carry out ADLs; assess patient's baseline for ADL function and identify physical deficits which impact ability to perform ADLs (bathing, care of mouth/teeth, toileting, grooming, dressing, etc.)  - Assess/evaluate cause of self-care deficits   - Assess range of motion  - Assess patient's mobility; develop plan if impaired  - Assess patient's need for assistive devices and provide as appropriate  - Encourage maximum independence but intervene and supervise when necessary  - Involve family in performance of ADLs  - Assess for home care needs following discharge   - Consider OT consult to assist with ADL evaluation and planning for discharge  - Provide patient education as appropriate  Outcome: Progressing  Goal: Maintains/Returns to pre admission functional level  Description: INTERVENTIONS:  - Perform AM-PAC 6 Click Basic Mobility/ Daily Activity assessment daily.  - Set and communicate daily mobility goal to care team and patient/family/caregiver.   - Collaborate with rehabilitation services on mobility goals if consulted  -- Out of bed for toileting  - Record patient progress and toleration of activity level   Outcome: Progressing     Problem: DISCHARGE PLANNING  Goal: Discharge to home or other facility with appropriate resources  Description: INTERVENTIONS:  - Identify barriers to discharge w/patient and caregiver  - Arrange for needed discharge resources and transportation as appropriate  - Identify discharge learning needs (meds, wound care, etc.)  - Arrange for interpretive services to assist at discharge as needed  - Refer to Case Management Department for coordinating discharge planning if the patient needs post-hospital services based on physician/advanced practitioner order or complex needs  related to functional status, cognitive ability, or social support system  Outcome: Progressing     Problem: POSTPARTUM  Goal: Experiences normal postpartum course  Description: INTERVENTIONS:  - Monitor maternal vital signs  - Assess uterine involution and lochia  Outcome: Progressing  Goal: Appropriate maternal -  bonding  Description: INTERVENTIONS:  - Identify family support  - Assess for appropriate maternal/infant bonding   -Encourage maternal/infant bonding opportunities  - Referral to  or  as needed  Outcome: Progressing  Goal: Establishment of infant feeding pattern  Description: INTERVENTIONS:  - Assess breast/bottle feeding  - Refer to lactation as needed  Outcome: Progressing  Goal: Incision(s), wounds(s) or drain site(s) healing without S/S of infection  Description: INTERVENTIONS  - Assess and document dressing, incision, wound bed, drain sites and surrounding tissue  - Provide patient and family education    Outcome: Progressing

## 2024-11-03 VITALS
HEIGHT: 59 IN | HEART RATE: 102 BPM | OXYGEN SATURATION: 97 % | TEMPERATURE: 97.8 F | WEIGHT: 201.4 LBS | SYSTOLIC BLOOD PRESSURE: 149 MMHG | DIASTOLIC BLOOD PRESSURE: 100 MMHG | BODY MASS INDEX: 40.6 KG/M2 | RESPIRATION RATE: 16 BRPM

## 2024-11-03 PROCEDURE — 99024 POSTOP FOLLOW-UP VISIT: CPT | Performed by: STUDENT IN AN ORGANIZED HEALTH CARE EDUCATION/TRAINING PROGRAM

## 2024-11-03 PROCEDURE — NC001 PR NO CHARGE: Performed by: STUDENT IN AN ORGANIZED HEALTH CARE EDUCATION/TRAINING PROGRAM

## 2024-11-03 PROCEDURE — 0JHF3HZ INSERTION OF CONTRACEPTIVE DEVICE INTO LEFT UPPER ARM SUBCUTANEOUS TISSUE AND FASCIA, PERCUTANEOUS APPROACH: ICD-10-PCS | Performed by: STUDENT IN AN ORGANIZED HEALTH CARE EDUCATION/TRAINING PROGRAM

## 2024-11-03 PROCEDURE — 11981 INSERTION DRUG DLVR IMPLANT: CPT | Performed by: STUDENT IN AN ORGANIZED HEALTH CARE EDUCATION/TRAINING PROGRAM

## 2024-11-03 RX ORDER — BENZOCAINE/MENTHOL 6 MG-10 MG
1 LOZENGE MUCOUS MEMBRANE DAILY PRN
Start: 2024-11-03 | End: 2024-11-07

## 2024-11-03 RX ORDER — FUROSEMIDE 20 MG/1
20 TABLET ORAL DAILY
Qty: 3 TABLET | Refills: 0 | Status: SHIPPED | OUTPATIENT
Start: 2024-11-03 | End: 2024-11-07

## 2024-11-03 RX ORDER — IBUPROFEN 600 MG/1
600 TABLET, FILM COATED ORAL EVERY 6 HOURS
Qty: 60 TABLET | Refills: 0 | Status: SHIPPED | OUTPATIENT
Start: 2024-11-03

## 2024-11-03 RX ORDER — ACETAMINOPHEN 325 MG/1
650 TABLET ORAL EVERY 6 HOURS SCHEDULED
Qty: 60 TABLET | Refills: 0 | Status: SHIPPED | OUTPATIENT
Start: 2024-11-03

## 2024-11-03 RX ORDER — ACETAMINOPHEN 325 MG/1
650 TABLET ORAL EVERY 6 HOURS SCHEDULED
Start: 2024-11-03 | End: 2024-11-03

## 2024-11-03 RX ORDER — LIDOCAINE HYDROCHLORIDE 10 MG/ML
2 INJECTION, SOLUTION EPIDURAL; INFILTRATION; INTRACAUDAL; PERINEURAL ONCE
Status: COMPLETED | OUTPATIENT
Start: 2024-11-03 | End: 2024-11-03

## 2024-11-03 RX ORDER — IBUPROFEN 600 MG/1
600 TABLET, FILM COATED ORAL EVERY 6 HOURS
Start: 2024-11-03 | End: 2024-11-03

## 2024-11-03 RX ORDER — NIFEDIPINE 30 MG/1
30 TABLET, EXTENDED RELEASE ORAL DAILY
Qty: 30 TABLET | Refills: 1 | Status: SHIPPED | OUTPATIENT
Start: 2024-11-03 | End: 2025-01-02

## 2024-11-03 RX ADMIN — DOCUSATE SODIUM 100 MG: 100 CAPSULE, LIQUID FILLED ORAL at 11:06

## 2024-11-03 RX ADMIN — FUROSEMIDE 20 MG: 20 TABLET ORAL at 11:11

## 2024-11-03 RX ADMIN — IBUPROFEN 600 MG: 600 TABLET ORAL at 06:31

## 2024-11-03 RX ADMIN — IBUPROFEN 600 MG: 600 TABLET ORAL at 00:33

## 2024-11-03 RX ADMIN — ESCITALOPRAM OXALATE 20 MG: 10 TABLET ORAL at 11:06

## 2024-11-03 RX ADMIN — ETONOGESTREL 68 MG: 68 IMPLANT SUBCUTANEOUS at 12:23

## 2024-11-03 RX ADMIN — LIDOCAINE HYDROCHLORIDE 2 ML: 10 INJECTION, SOLUTION EPIDURAL; INFILTRATION; INTRACAUDAL; PERINEURAL at 12:20

## 2024-11-03 RX ADMIN — NIFEDIPINE 30 MG: 30 TABLET, FILM COATED, EXTENDED RELEASE ORAL at 11:05

## 2024-11-03 RX ADMIN — ACETAMINOPHEN 650 MG: 325 TABLET, FILM COATED ORAL at 01:38

## 2024-11-03 RX ADMIN — ENOXAPARIN SODIUM 40 MG: 40 INJECTION SUBCUTANEOUS at 11:06

## 2024-11-03 RX ADMIN — ACETAMINOPHEN 650 MG: 325 TABLET, FILM COATED ORAL at 11:05

## 2024-11-03 NOTE — PLAN OF CARE
Problem: PAIN - ADULT  Goal: Verbalizes/displays adequate comfort level or baseline comfort level  Description: Interventions:  - Encourage patient to monitor pain and request assistance  - Assess pain using appropriate pain scale  - Administer analgesics based on type and severity of pain and evaluate response  - Implement non-pharmacological measures as appropriate and evaluate response  - Consider cultural and social influences on pain and pain management  - Notify physician/advanced practitioner if interventions unsuccessful or patient reports new pain  Outcome: Progressing     Problem: INFECTION - ADULT  Goal: Absence or prevention of progression during hospitalization  Description: INTERVENTIONS:  - Assess and monitor for signs and symptoms of infection  - Monitor lab/diagnostic results  - Monitor all insertion sites, i.e. indwelling lines, tubes, and drains  - Monitor endotracheal if appropriate and nasal secretions for changes in amount and color  - [place] appropriate cooling/warming therapies per order  - Administer medications as ordered  - Instruct and encourage patient and family to use good hand hygiene technique  - Identify and instruct in appropriate isolation precautions for identified infection/condition  Outcome: Progressing  Goal: Absence of fever/infection during neutropenic period  Description: INTERVENTIONS:  - Monitor WBC    Outcome: Progressing     Problem: SAFETY ADULT  Goal: Patient will remain free of falls  Description: INTERVENTIONS:  - Educate patient/family on patient safety including physical limitations  - Instruct patient to call for assistance with activity   - Consult OT/PT to assist with strengthening/mobility   - Keep Call bell within reach  - Keep bed low and locked with side rails adjusted as appropriate  - Keep care items and personal belongings within reach  - Initiate and maintain comfort rounds  - Make Fall Risk Sign visible to staff  - Offer Toileting every  Hours,  in advance of need  - Initiate/Maintain alarm  - Obtain necessary fall risk management equipment:   - Apply yellow socks and bracelet for high fall risk patients  - Consider moving patient to room near nurses station  Outcome: Progressing  Goal: Maintain or return to baseline ADL function  Description: INTERVENTIONS:  -  Assess patient's ability to carry out ADLs; assess patient's baseline for ADL function and identify physical deficits which impact ability to perform ADLs (bathing, care of mouth/teeth, toileting, grooming, dressing, etc.)  - Assess/evaluate cause of self-care deficits   - Assess range of motion  - Assess patient's mobility; develop plan if impaired  - Assess patient's need for assistive devices and provide as appropriate  - Encourage maximum independence but intervene and supervise when necessary  - Involve family in performance of ADLs  - Assess for home care needs following discharge   - Consider OT consult to assist with ADL evaluation and planning for discharge  - Provide patient education as appropriate  Outcome: Progressing  Goal: Maintains/Returns to pre admission functional level  Description: INTERVENTIONS:  - Perform AM-PAC 6 Click Basic Mobility/ Daily Activity assessment daily.  - Set and communicate daily mobility goal to care team and patient/family/caregiver.   - Collaborate with rehabilitation services on mobility goals if consulted  - Perform Range of Motion times a day.  - Reposition patient every  hours.  - Dangle patient  times a day  - Stand patient  times a day  - Ambulate patient  times a day  - Out of bed to chair  times a day   - Out of bed for meals  times a day  - Out of bed for toileting  - Record patient progress and toleration of activity level   Outcome: Progressing     Problem: DISCHARGE PLANNING  Goal: Discharge to home or other facility with appropriate resources  Description: INTERVENTIONS:  - Identify barriers to discharge w/patient and caregiver  - Arrange for  needed discharge resources and transportation as appropriate  - Identify discharge learning needs (meds, wound care, etc.)  - Arrange for interpretive services to assist at discharge as needed  - Refer to Case Management Department for coordinating discharge planning if the patient needs post-hospital services based on physician/advanced practitioner order or complex needs related to functional status, cognitive ability, or social support system  Outcome: Progressing     Problem: POSTPARTUM  Goal: Experiences normal postpartum course  Description: INTERVENTIONS:  - Monitor maternal vital signs  - Assess uterine involution and lochia  Outcome: Progressing  Goal: Appropriate maternal -  bonding  Description: INTERVENTIONS:  - Identify family support  - Assess for appropriate maternal/infant bonding   -Encourage maternal/infant bonding opportunities  - Referral to  or  as needed  Outcome: Progressing  Goal: Establishment of infant feeding pattern  Description: INTERVENTIONS:  - Assess breast/bottle feeding  - Refer to lactation as needed  Outcome: Progressing  Goal: Incision(s), wounds(s) or drain site(s) healing without S/S of infection  Description: INTERVENTIONS  - Assess and document dressing, incision, wound bed, drain sites and surrounding tissue  - Provide patient and family education  - Perform skin care/dressing changes every   Outcome: Progressing

## 2024-11-03 NOTE — PROCEDURES
"Mone Garces, cordell  at 38w3d with an SOUMYA of 2024, by Ultrasound, was seen at [facility] LABOR AND DELIVERY for the following procedure(s):  ]                    Universal Protocol:  Consent: Written consent obtained.  Risks and benefits: risks, benefits and alternatives were discussed  Consent given by: patient  Time out: Immediately prior to procedure a \"time out\" was called to verify the correct patient, procedure, equipment, support staff and site/side marked as required.  Timeout called at: 11/3/2024 12:30 PM.  Patient understanding: patient states understanding of the procedure being performed  Patient consent: the patient's understanding of the procedure matches consent given  Procedure consent: procedure consent matches procedure scheduled  Relevant documents: relevant documents present and verified  Test results: test results available and properly labeled  Required items: required blood products, implants, devices, and special equipment available  Patient identity confirmed: verbally with patient  Remove and insert drug implant    Date/Time: 11/3/2024 12:30 PM    Performed by: Suni Hernandez MD  Authorized by: Suni Hernandez MD    Indication:     Indication: Insertion of non-biodegradable drug delivery implant    Pre-procedure:     Pre-procedure timeout performed: yes      Prepped with: alcohol 70% and povidone-iodine      The site was cleaned and prepped in a sterile fashion: yes    Procedure:     Procedure:  Insertion    Left/right:  Left    Preloaded contraceptive capsule trocar was placed subdermally: yes      Visualization of implant was obtained: yes      Contraceptive capsule was inserted and trocar removed: yes      Visualization of notch in stylet and palpation of device: yes      Palpation confirms placement by provider and patient: yes      Site was closed with steri-strips and pressure bandage applied: yes    Comments:      Uncomplicated " nexplanon insertion           Suni Hernandez MD  11/03/24  12:28 PM

## 2024-11-03 NOTE — PROGRESS NOTES
Obstetrics Progress Note  Mone Garces 29 y.o. female MRN: [id number]  Unit/Bed#: -01 Encounter: [id number]    Assessment/Plan:    Postpartum Day #2 s/p 1LTCS (maternal request) delivery complicated by postpartum hemorrhage (QBL 1028 mL), currently stable. Baby in room. Patient desires discharge home today. By issue:    Postpartum hemorrhage  Assessment & Plan  QBL 1028 cc s/p extra pit, TXA  HgB 10.1 > 8.5 > venofer > 8.8  HgB stable, continue to monitor for s/sx of ABLA    Pregnancy complicated by fetal abdominal abnormality  Assessment & Plan  Echogenic meconium in fetus -> NICU to eval after delivery    Pre-eclampsia in third trimester  Assessment & Plan  Mildly elevated BP >4 hours apart  CBC/CMP wnl except hgb 10.1, P:C 0.4  : started Procardia XL 30 mg   BP in last 12 hours mostly normotensive:   - Systolic (12hrs), Av , Min:116 , Max:141        - Diastolic (12hrs), Av, Min:73, Max:91       Continue to monitor closely       Enroll in postpartum BP monitoring program      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 Plan to place prior to discharge    S/P  section  Assessment & Plan  QBL 1028 cc s/p extra pit, TXA  S/p venofer on POD#0  Results from last 7 days   Lab Units 24  0509 24  0933 10/31/24  1414   HEMOGLOBIN g/dL 8.8* 8.5* 10.1*   HEMATOCRIT % 27.0* 26.6* 31.1*     Lines: tovar in place   Pain: Tylenol and toradol scheduled, garima 5/10 PRN    FEN: Tolerating regular diet  DVT ppx: SCDs and  Lovenox 40mg qD  Passing flatus   Incision C/D/I       Gestational diabetes  Assessment & Plan  Lab Results   Component Value Date    HGBA1C 5.9 (H)  10/31/2024   Repeat hgb A1c collected on admission  F/u outpatient        Subjective/Objective     Subjective:   No acute events overnight. Pain: controlled. Tolerating PO: yes. Voiding: yes. Flatus: passing. Ambulating: yes. Chest pain: no. Shortness of breath: no. Leg pain: no. Lochia: within normal limits. Denies any signs/symptoms of ABLA including palpitations, pre-syncope, dizziness. Baby in room, feeding via bottle. Patient desires d/c home today and Nexplanon placement for contraception prior to discharge.    Objective:   Vitals:   Temp:  [97.5 °F (36.4 °C)-98.6 °F (37 °C)] 98.2 °F (36.8 °C)  HR:  [79-96] 83  BP: (114-141)/(73-91) 129/77  Resp:  [16-18] 16  SpO2:  [94 %-99 %] 95 %  O2 Device: None (Room air)     No intake or output data in the 24 hours ending 11/03/24 0143      Lab Results   Component Value Date    WBC 12.76 (H) 11/02/2024    HGB 8.8 (L) 11/02/2024    HCT 27.0 (L) 11/02/2024    MCV 82 11/02/2024     11/02/2024       Physical Exam:   General: alert and oriented x3, in no apparent distress  Cardiovascular: regular rate and rhythm  Pulmonary: normal effort, CTAB  Abdomen: Soft, non-tender, non-distended, no rebound or guarding. Uterine fundus firm and non-tender, at the umbilicus  Incision: clean/dry/intact  Extremities: Non tender with no edema    Elizabeth Francisco MD  OBGYN PGY2  11/3/2024, 1:43 AM

## 2024-11-04 ENCOUNTER — TELEPHONE (OUTPATIENT)
Dept: OBGYN CLINIC | Facility: CLINIC | Age: 29
End: 2024-11-04

## 2024-11-04 NOTE — UTILIZATION REVIEW
"      MOTHER AND BABY DISCHARGED NOV 3    NOTIFICATION OF INPATIENT ADMISSION   MATERNITY/DELIVERY AUTHORIZATION REQUEST   SERVICING FACILITY:   [facility]  Parent Child Health - L&D, [place], NICU  [address] [place] [address]  Tax ID: [id number]  NPI: [id number] ATTENDING PROVIDER:  Attending Name and NPI#: Olena Eckert Md [[id number]]  Address: [address] [place] [address]  Phone: [phone]     ADMISSION INFORMATION:  Place of Service: Inpatient [facility]  Place of Service Code: 21  Inpatient Admission Date/Time: 10/31/24  2:30 PM  Discharge Date/Time: 11/3/2024  1:45 PM  Admitting Diagnosis Code/Description:  Elevated blood pressure reading [R03.0]   Mother: Mone Garces 1995 Estimated Date of Delivery: 24  Delivering clinician: Olena Eckert   OB History          4    Para   3    Term   3            AB   1    Living   3         SAB        IAB   1    Ectopic        Multiple   0    Live Births   3           Obstetric Comments   Menarche 12  Cycles Q 28d  Vaginal birth x 1              Name & MRN:   Information for the patient's :  Dez, Baby Boy (Mone) [[id number]]    Delivery Information:  Sex: male  Delivered 2024 1:51 AM by , Low Transverse; Gestational Age: 38w3d    [place] Measurements:  Weight: 7 lb 2.3 oz (3240 g);  Height: 19.5\"    APGAR 1 minute 5 minutes 10 minutes   Totals: 8 9       UTILIZATION REVIEW CONTACT:  Lorena Gross Utilization   Network Utilization Review Department  Phone: [phone]  Fax [phone]  Email: [email or web address]  Contact for approvals/pending authorizations, clinical reviews, and discharge.   PHYSICIAN ADVISORY SERVICES:  Medical Necessity Denial & Yyia-jp-Tdwy Review  Phone: [phone]  Fax: [phone]  Email: [email or web address]   DISCHARGE SUPPORT TEAM:  For " Patients Discharge Needs & Updates  Phone: [phone] opt. 2 Fax: [phone]  Email: [email or web address]     NOTIFICATION OF ADMISSION DISCHARGE   This is a Notification of Discharge from [facility]. Please be advised that this patient has been discharge from our facility. Below you will find the admission and discharge date and time including the patient’s disposition.   UTILIZATION REVIEW CONTACT:  Yolis Vasquez  Utilization   Network Utilization Review Department  Phone: [phone] x carefully listen to the prompts. All voicemails are confidential.  Email: [email or web address]     ADMISSION INFORMATION  PRESENTATION DATE: 10/31/2024  1:25 PM  OBERVATION ADMISSION DATE: N/A  INPATIENT ADMISSION DATE: 10/31/24  2:30 PM   DISCHARGE DATE: 11/3/2024  1:45 PM   DISPOSITION:Home/Self Care    Network Utilization Review Department  ATTENTION: Please call with any questions or concerns to [phone] and carefully listen to the prompts so that you are directed to the right person. All voicemails are confidential.   For Discharge needs, contact Care Management DC Support Team at [phone] opt. 2  Send all requests for admission clinical reviews, approved or denied determinations and any other requests to dedicated fax number below belonging to the campus where the patient is receiving treatment. List of dedicated fax numbers for the Facilities:  FACILITY NAME UR FAX NUMBER   ADMISSION DENIALS (Administrative/Medical Necessity) [phone]   DISCHARGE SUPPORT TEAM ([facility]) [phone]   PARENT CHILD HEALTH (Maternity/NICU/Pediatrics) [phone]   [facility] [phone]   [facility] [phone]   [facility] [phone]   [facility] [phone]   [facility] [phone]   [facility]  [phone]   [facility] [phone]   [facility] [phone]   [facility] [phone]   [facility] [phone]   [facility] [phone]   [facility] [phone]

## 2024-11-05 ENCOUNTER — TELEPHONE (OUTPATIENT)
Dept: PERINATAL CARE | Facility: OTHER | Age: 29
End: 2024-11-05

## 2024-11-05 ENCOUNTER — TELEPHONE (OUTPATIENT)
Dept: OBGYN CLINIC | Facility: CLINIC | Age: 29
End: 2024-11-05

## 2024-11-05 NOTE — TELEPHONE ENCOUNTER
Patient has been noncompliant in BP monitoring program.  Has not submitted blood pressure readings since 11/3/2024.  Called to remind patient to submit readings.  Spoke to patient.  Reviewed instructions for submitting readings. Verbalized understanding and will submit BP readings.

## 2024-11-05 NOTE — TELEPHONE ENCOUNTER
Blood pressure monitoring program.  Delivery had a medium alert blood pressure at 1130 this morning of 143/100.  Left message to assess for headaches, visual changes or abdominal pain and to verify that she is taking her Procardia as ordered.  Advised her to reach out to her OB with any concerns.  Alert cleared.    1302 repeat 150/99. Message sent to Dr. Fournier and alert cleared.     2 PM from Dr. Fournier  if she is taking her medication and took it today, she should increase it to 60 mg daily. She should take a second tablet today and then take 2 at the same time tomorrow. She should also be seen in the office by the end of the week. please have her call for the same and continue to monitor bp.     She is aware of medication change. She has appt on Friday for BP check. Will submit another reading this evening.

## 2024-11-06 PROCEDURE — 88307 TISSUE EXAM BY PATHOLOGIST: CPT | Performed by: PATHOLOGY

## 2024-11-07 ENCOUNTER — TELEPHONE (OUTPATIENT)
Dept: PERINATAL CARE | Facility: OTHER | Age: 29
End: 2024-11-07

## 2024-11-07 ENCOUNTER — TELEMEDICINE (OUTPATIENT)
Dept: FAMILY MEDICINE CLINIC | Facility: CLINIC | Age: 29
End: 2024-11-07
Payer: COMMERCIAL

## 2024-11-07 VITALS — WEIGHT: 185 LBS | HEIGHT: 59 IN | BODY MASS INDEX: 37.29 KG/M2

## 2024-11-07 DIAGNOSIS — Z98.891 S/P CESAREAN SECTION: ICD-10-CM

## 2024-11-07 DIAGNOSIS — G89.29 CHRONIC MIDLINE LOW BACK PAIN WITHOUT SCIATICA: ICD-10-CM

## 2024-11-07 DIAGNOSIS — E66.812 CLASS 2 OBESITY DUE TO EXCESS CALORIES WITHOUT SERIOUS COMORBIDITY WITH BODY MASS INDEX (BMI) OF 37.0 TO 37.9 IN ADULT: ICD-10-CM

## 2024-11-07 DIAGNOSIS — E66.09 CLASS 2 OBESITY DUE TO EXCESS CALORIES WITHOUT SERIOUS COMORBIDITY WITH BODY MASS INDEX (BMI) OF 37.0 TO 37.9 IN ADULT: ICD-10-CM

## 2024-11-07 DIAGNOSIS — M54.50 CHRONIC MIDLINE LOW BACK PAIN WITHOUT SCIATICA: ICD-10-CM

## 2024-11-07 DIAGNOSIS — F41.9 ANXIETY: Primary | ICD-10-CM

## 2024-11-07 PROBLEM — B95.1 POSITIVE GBS TEST: Status: RESOLVED | Noted: 2023-11-24 | Resolved: 2024-11-07

## 2024-11-07 PROCEDURE — G2012 BRIEF CHECK IN BY MD/QHP: HCPCS | Performed by: NURSE PRACTITIONER

## 2024-11-07 RX ORDER — ALPRAZOLAM 0.5 MG
0.5 TABLET ORAL 2 TIMES DAILY PRN
Qty: 60 TABLET | Refills: 3 | Status: SHIPPED | OUTPATIENT
Start: 2024-11-07

## 2024-11-07 RX ORDER — CYCLOBENZAPRINE HCL 10 MG
10 TABLET ORAL 3 TIMES DAILY PRN
Qty: 60 TABLET | Refills: 0 | Status: SHIPPED | OUTPATIENT
Start: 2024-11-07

## 2024-11-07 NOTE — ASSESSMENT & PLAN NOTE
Refill flexeril  Recommend core exercise and rehab once cleared from GYN   Orders:    cyclobenzaprine (FLEXERIL) 10 mg tablet; Take 1 tablet (10 mg total) by mouth 3 (three) times a day as needed for muscle spasms

## 2024-11-07 NOTE — TELEPHONE ENCOUNTER
Blood pressure monitoring program.Mone had a medium alert blood pressure at 1156 of 150/87.  She said that he blood pressure prior to taking her Procardia.  She denies headaches, visual changes or abdominal pain.  She will submit repeat blood pressure.

## 2024-11-07 NOTE — ASSESSMENT & PLAN NOTE
Refer to pelvic floor PT to help strengthen after two consecutive pregnancy and worsening back pain   Orders:    Ambulatory Referral to Physical Therapy; Future

## 2024-11-07 NOTE — ASSESSMENT & PLAN NOTE
Pmpd reviewed   Advised patient need in office visit next time to sign contract. Patient has nexplanon in place for future prevention xanax is contraindicated in pregnancy  She is on lexapro 20mg as well     Orders:    ALPRAZolam (XANAX) 0.5 mg tablet; Take 1 tablet (0.5 mg total) by mouth 2 (two) times a day as needed for anxiety

## 2024-11-07 NOTE — PROGRESS NOTES
Virtual Brief Visit  Name: Mone Garces      : 1995      MRN: [id number]  Encounter Provider: HALLIE Rios  Encounter Date: 2024   Encounter department: [facility] PRIMARY CARE    This Visit is being completed by telephone. The Patient is located at Home and in the following state in which I hold an active license [place]    The patient was identified by name and date of birth. Mone Garces was informed that this is a telemedicine visit and that the visit is being conducted through Telephone.  My office door was closed. No one else was in the room.  She acknowledged consent and understanding of privacy and security of the video platform. The patient has agreed to participate and understands they can discontinue the visit at any time.    Patient is aware this is a billable service.     Assessment & Plan  Anxiety  Pmpd reviewed   Advised patient need in office visit next time to sign contract. Patient has nexplanon in place for future prevention xanax is contraindicated in pregnancy  She is on lexapro 20mg as well     Orders:    ALPRAZolam (XANAX) 0.5 mg tablet; Take 1 tablet (0.5 mg total) by mouth 2 (two) times a day as needed for anxiety      Chronic midline low back pain without sciatica  Refill flexeril  Recommend core exercise and rehab once cleared from GYN   Orders:    cyclobenzaprine (FLEXERIL) 10 mg tablet; Take 1 tablet (10 mg total) by mouth 3 (three) times a day as needed for muscle spasms      S/P  section  Refer to pelvic floor PT to help strengthen after two consecutive pregnancy and worsening back pain   Orders:    Ambulatory Referral to Physical Therapy; Future      Class 2 obesity due to excess calories without serious comorbidity with body mass index (BMI) of 37.0 to 37.9 in adult    Because of consecutive pregnancy with short interval patient reports she gained a lot for weight would like help to lose weight being post partum      Orders:    Ambulatory Referral to Weight Management; Future           History of Present Illness   Patient presents today for follow up   She had a baby November 1st   She is taking lexapro 20mg   She has not been taking xanax because she was pregnant   She is not breastfeeding   Patient has nexplanon in place for birth control     Has been having more back pain so would like to also get refill on her muscle relaxer            Visit Time  Total Visit Duration: 10

## 2024-11-07 NOTE — ASSESSMENT & PLAN NOTE
Because of consecutive pregnancy with short interval patient reports she gained a lot for weight would like help to lose weight being post partum     Orders:    Ambulatory Referral to Weight Management; Future

## 2024-11-08 NOTE — PSYCH
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 in this context together with the history and clinical findings.      Gross Description 05/28/2024                      Value:A. 20 ml , colorless, cloudy received in a ThinPrep vial.      LMP 05/28/2024 1/10/2024     N gonorrhoeae, DNA Probe 05/28/2024 Negative     Chlamydia trachomatis, D* 05/28/2024 Negative     HPV Other HR 05/28/2024 Negative     HPV16 05/28/2024 Negative     HPV18 05/28/2024 Negative      I have personally reviewed all pertinent laboratory/tests results.    Admission on 10/31/2024, Discharged on 11/03/2024   Component Date Value Ref Range Status    WBC 10/31/2024 10.09  4.31 - 10.16 Thousand/uL Final    RBC 10/31/2024 3.80 (L)  3.81 - 5.12 Million/uL Final    Hemoglobin 10/31/2024 10.1 (L)  11.5 - 15.4 g/dL Final    Hematocrit 10/31/2024 31.1 (L)  34.8 - 46.1 % Final    MCV 10/31/2024 82  82 -  98 fL Final    MCH 10/31/2024 26.6 (L)  26.8 - 34.3 pg Final    MCHC 10/31/2024 32.5  31.4 - 37.4 g/dL Final    RDW 10/31/2024 14.1  11.6 - 15.1 % Final    Platelets 10/31/2024 242  149 - 390 Thousands/uL Final    MPV 10/31/2024 11.2  8.9 - 12.7 fL Final    Syphilis Total Antibody 10/31/2024 Non-reactive  Non-Reactive Final    No serological evidence of infection with T. pallidum.  Early or incubating syphilis infection cannot be excluded.  Consider repeat testing based on clinical suspicion.    Sodium 10/31/2024 136  135 - 147 mmol/L Final    Potassium 10/31/2024 3.8  3.5 - 5.3 mmol/L Final    Chloride 10/31/2024 107  96 - 108 mmol/L Final    CO2 10/31/2024 22  21 - 32 mmol/L Final    ANION GAP 10/31/2024 7  4 - 13 mmol/L Final    BUN 10/31/2024 11  5 - 25 mg/dL Final    Creatinine 10/31/2024 0.65  0.60 - 1.30 mg/dL Final    Standardized to IDMS reference method    Glucose 10/31/2024 115  65 - 140 mg/dL Final    If the patient is fasting, the ADA then defines impaired fasting glucose as > 100 mg/dL and diabetes as > or equal to 123 mg/dL.    Calcium 10/31/2024 9.5  8.4 - 10.2 mg/dL Final    Corrected Calcium 10/31/2024 10.1  8.3 - 10.1 mg/dL Final    AST 10/31/2024 13  13 - 39 U/L Final    ALT 10/31/2024 8  7 - 52 U/L Final    Specimen collection should occur prior to Sulfasalazine administration due to the potential for falsely depressed results.     Alkaline Phosphatase 10/31/2024 134 (H)  34 - 104 U/L Final    Total Protein 10/31/2024 6.4  6.4 - 8.4 g/dL Final    Albumin 10/31/2024 3.2 (L)  3.5 - 5.0 g/dL Final    Total Bilirubin 10/31/2024 0.25  0.20 - 1.00 mg/dL Final    Use of this assay is not recommended for patients undergoing treatment with eltrombopag due to the potential for falsely elevated results.  N-acetyl-p-benzoquinone imine (metabolite of Acetaminophen) will generate erroneously low results in samples for patients that have taken an overdose of Acetaminophen.    eGFR 10/31/2024 121  ml/min/1.73sq  m Final    Creatinine, Ur 10/31/2024 166.9  Reference range not established. mg/dL Final    Protein Urine Random 10/31/2024 65.0  Reference range not established. mg/dL Final    Prot/Creat Ratio, Ur 10/31/2024 0.4 (H)  0.0 - 0.1 Final    Hemoglobin A1C 10/31/2024 5.9 (H)  Normal 4.0-5.6%; PreDiabetic 5.7-6.4%; Diabetic >=6.5%; Glycemic control for adults with diabetes <7.0% % Final    EAG 10/31/2024 123  mg/dl Final    ABO Grouping 10/31/2024 B   Final    Rh Factor 10/31/2024 Positive   Final    Antibody Screen 10/31/2024 Positive   Final    Patient is pregnant with a positive antibody screen. Antibody Identification and Antibody Titer have been ordered.    Specimen Expiration Date 10/31/2024 [id number]   Final    POC Glucose 10/31/2024 114  65 - 140 mg/dl Final    POC Glucose 10/31/2024 89  65 - 140 mg/dl Final    ANTIBODY ID. #1 10/31/2024 Anti-K   Final    ANTIBODY ID. #2 10/31/2024 Anti-M   Final    ANTIBODY ID. #3 10/31/2024 Anti-S   Final    POC Glucose 10/31/2024 79  65 - 140 mg/dl Final    POC Glucose 10/31/2024 84  65 - 140 mg/dl Final    pH, Cord Art 11/01/2024 7.251  7.230 - 7.430 Final    pCO2, Cord Art 11/01/2024 56.6  30.0 - 60.0 Final    pO2, Cord Art 11/01/2024 17.7  5.0 - 25.0 mm HG Final    HCO3, Cord Art 11/01/2024 24.3  17.3 - 27.3 mmol/L Final    Base Exc, Cord Art 11/01/2024 -3.9 (L)  3.0 - 11.0 mmol/L Final    O2 Content, Cord Art 11/01/2024 8.3  ml/dl Final    O2 Hgb, Arterial Cord 11/01/2024 37.4  % Final    pH, Cord Kam 11/01/2024 7.343  7.190 - 7.490 Final    pCO2, Cord Kam 11/01/2024 40.7  27.0 - 43.0 mm HG Final    pO2, Cord Kam 11/01/2024 23.8  15.0 - 45.0 mm HG Final    HCO3, Cord Kam 11/01/2024 21.6  12.2 - 28.6 mmol/L Final    Base Exc, Cord Kam 11/01/2024 -3.8 (L)  1.0 - 9.0 mmol/L Final    O2 Cont, Cord Kam 11/01/2024 12.9  mL/dL Final    O2 HGB,VENOUS CORD 11/01/2024 60.6  % Final    POC Glucose 10/31/2024 74  65 - 140 mg/dl Final    Case Report 11/01/2024    Final                     Value:Surgical Pathology Report                         Case: [id number]                                  Authorizing Provider:  Olena Eckert MD       Collected:           2024 0144              Ordering Location:     [facility]        Received:            2024 [address] [place] [address] Labor and                                                                                 Delivery                                                                     Pathologist:           Cody Marley MD                                                           Specimen:    Placenta, FOR PATHOLOGY                                                                    Final Diagnosis 2024    Final                    Value:A. Placenta (441g),  section delivery:  - Intact mature placenta with eccentrically inserted, three-vessel umbilical cord  - No evidence of chorioamnionitis, umbilical vasculitis, funisitis, or villitis  - Placental size and weight about 25th percentile for gestational age of 38 weeks and 3 days with age-appropriate and uniform villous morphology  - Placental infarcts not present  - No fetal or maternal vascular abnormality or thrombosis  - No malformative condition and no evidence of neoplasia            Additional Information 2024    Final                    Value:All reported additional testing was performed with appropriately reactive controls.  These tests were developed and their performance characteristics determined by [facility] or appropriate performing facility, though some tests may be performed on tissues which have not been validated for performance characteristics (such as staining performed on alcohol exposed cell blocks and decalcified tissues).  Results should be interpreted with caution and in the context of the patients’ clinical condition. These tests may not be cleared or approved by the U.S. Food and  "Drug Administration, though the FDA has determined that such clearance or approval is not necessary. These tests are used for clinical purposes and they should not be regarded as investigational or for research. This laboratory has been approved by CLIA 88, designated as a high-complexity laboratory and is qualified to perform these tests.  .Interpretation performed at [facility], [address] [facility] [address]        Gross Description 11/01/2024    Final                    Value:A. The specimen is received in formalin, labeled with the patient's name and hospital number, and is designated \" placenta for pathology.\"  It consists of a 441 g (trimmed weight), 18.5 x 16 x 2.5 cm placenta with attached fetal membranes and umbilical cord.  The umbilical cord is eccentrically inserted 3 cm from the disc edge and is white-tan, glistening, and normally coiled.  It measures 17.5 cm in length, 1.4-2.3 cm in diameter and displays 3 vessels upon sectioning.  The fetal membranes are marginally inserted and are pink-tan, thin, and semitranslucent.  The fetal surface is blue-purple and well arborized with small caliber vessels.  The maternal surface is red-purple with no areas of disruption or adherent blood clot.  Sectioning reveals pink to pink-red, soft, spongy to dense parenchyma with no apparent lesions.  Representative sections are submitted as follows:  1: Umbilical cord  2: Fetal membranes  3-4: Full-thickness sections    Note: The estimated total formalin fixation                           time based upon information provided by the submitting clinician and the standard processing schedule is under 72 hours.    MScheib      WBC 11/01/2024 13.61 (H)  4.31 - 10.16 Thousand/uL Final    RBC 11/01/2024 3.20 (L)  3.81 - 5.12 Million/uL Final    Hemoglobin 11/01/2024 8.5 (L)  11.5 - 15.4 g/dL Final    Hematocrit 11/01/2024 26.6 (L)  34.8 - 46.1 % Final    MCV 11/01/2024 83  82 - 98 fL Final    " MCH 11/01/2024 26.6 (L)  26.8 - 34.3 pg Final    MCHC 11/01/2024 32.0  31.4 - 37.4 g/dL Final    RDW 11/01/2024 14.1  11.6 - 15.1 % Final    MPV 11/01/2024 10.8  8.9 - 12.7 fL Final    Platelets 11/01/2024 218  149 - 390 Thousands/uL Final    nRBC 11/01/2024 0  /100 WBCs Final    Segmented % 11/01/2024 79 (H)  43 - 75 % Final    Immature Grans % 11/01/2024 1  0 - 2 % Final    Lymphocytes % 11/01/2024 13 (L)  14 - 44 % Final    Monocytes % 11/01/2024 7  4 - 12 % Final    Eosinophils Relative 11/01/2024 0  0 - 6 % Final    Basophils Relative 11/01/2024 0  0 - 1 % Final    Absolute Neutrophils 11/01/2024 10.67 (H)  1.85 - 7.62 Thousands/µL Final    Absolute Immature Grans 11/01/2024 0.09  0.00 - 0.20 Thousand/uL Final    Absolute Lymphocytes 11/01/2024 1.81  0.60 - 4.47 Thousands/µL Final    Absolute Monocytes 11/01/2024 0.99  0.17 - 1.22 Thousand/µL Final    Eosinophils Absolute 11/01/2024 0.01  0.00 - 0.61 Thousand/µL Final    Basophils Absolute 11/01/2024 0.04  0.00 - 0.10 Thousands/µL Final    Sodium 11/01/2024 135  135 - 147 mmol/L Final    Potassium 11/01/2024 4.3  3.5 - 5.3 mmol/L Final    Chloride 11/01/2024 106  96 - 108 mmol/L Final    CO2 11/01/2024 24  21 - 32 mmol/L Final    ANION GAP 11/01/2024 5  4 - 13 mmol/L Final    BUN 11/01/2024 12  5 - 25 mg/dL Final    Creatinine 11/01/2024 0.73  0.60 - 1.30 mg/dL Final    Standardized to IDMS reference method    Glucose 11/01/2024 110  65 - 140 mg/dL Final    If the patient is fasting, the ADA then defines impaired fasting glucose as > 100 mg/dL and diabetes as > or equal to 123 mg/dL.    Calcium 11/01/2024 8.6  8.4 - 10.2 mg/dL Final    Corrected Calcium 11/01/2024 9.6  8.3 - 10.1 mg/dL Final    AST 11/01/2024 13  13 - 39 U/L Final    ALT 11/01/2024 6 (L)  7 - 52 U/L Final    Specimen collection should occur prior to Sulfasalazine administration due to the potential for falsely depressed results.     Alkaline Phosphatase 11/01/2024 103  34 - 104 U/L Final     Total Protein 11/01/2024 5.5 (L)  6.4 - 8.4 g/dL Final    Albumin 11/01/2024 2.8 (L)  3.5 - 5.0 g/dL Final    Total Bilirubin 11/01/2024 0.28  0.20 - 1.00 mg/dL Final    Use of this assay is not recommended for patients undergoing treatment with eltrombopag due to the potential for falsely elevated results.  N-acetyl-p-benzoquinone imine (metabolite of Acetaminophen) will generate erroneously low results in samples for patients that have taken an overdose of Acetaminophen.    eGFR 11/01/2024 112  ml/min/1.73sq m Final    WBC 11/02/2024 12.76 (H)  4.31 - 10.16 Thousand/uL Final    RBC 11/02/2024 3.28 (L)  3.81 - 5.12 Million/uL Final    Hemoglobin 11/02/2024 8.8 (L)  11.5 - 15.4 g/dL Final    Hematocrit 11/02/2024 27.0 (L)  34.8 - 46.1 % Final    MCV 11/02/2024 82  82 - 98 fL Final    MCH 11/02/2024 26.8  26.8 - 34.3 pg Final    MCHC 11/02/2024 32.6  31.4 - 37.4 g/dL Final    RDW 11/02/2024 14.1  11.6 - 15.1 % Final    Platelets 11/02/2024 261  149 - 390 Thousands/uL Final    MPV 11/02/2024 10.8  8.9 - 12.7 fL Final    Sodium 11/02/2024 135  135 - 147 mmol/L Final    Potassium 11/02/2024 4.1  3.5 - 5.3 mmol/L Final    Chloride 11/02/2024 104  96 - 108 mmol/L Final    CO2 11/02/2024 26  21 - 32 mmol/L Final    ANION GAP 11/02/2024 5  4 - 13 mmol/L Final    BUN 11/02/2024 11  5 - 25 mg/dL Final    Creatinine 11/02/2024 0.62  0.60 - 1.30 mg/dL Final    Standardized to IDMS reference method    Glucose 11/02/2024 109  65 - 140 mg/dL Final    If the patient is fasting, the ADA then defines impaired fasting glucose as > 100 mg/dL and diabetes as > or equal to 123 mg/dL.    Calcium 11/02/2024 8.9  8.4 - 10.2 mg/dL Final    Corrected Calcium 11/02/2024 9.6  8.3 - 10.1 mg/dL Final    AST 11/02/2024 16  13 - 39 U/L Final    ALT 11/02/2024 7  7 - 52 U/L Final    Specimen collection should occur prior to Sulfasalazine administration due to the potential for falsely depressed results.     Alkaline Phosphatase 11/02/2024 101  34 -  104 U/L Final    Total Protein 11/02/2024 6.2 (L)  6.4 - 8.4 g/dL Final    Albumin 11/02/2024 3.1 (L)  3.5 - 5.0 g/dL Final    Total Bilirubin 11/02/2024 0.21  0.20 - 1.00 mg/dL Final    Use of this assay is not recommended for patients undergoing treatment with eltrombopag due to the potential for falsely elevated results.  N-acetyl-p-benzoquinone imine (metabolite of Acetaminophen) will generate erroneously low results in samples for patients that have taken an overdose of Acetaminophen.    eGFR 11/02/2024 123  ml/min/1.73sq m Final   Appointment on 09/16/2024   Component Date Value Ref Range Status    ABO Grouping 09/16/2024 B   Final    Rh Factor 09/16/2024 Positive   Final    Antibody Screen 09/16/2024 Negative   Final    Specimen Expiration Date 09/16/2024 [id number]   Final    WBC 09/16/2024 11.33 (H)  4.31 - 10.16 Thousand/uL Final    RBC 09/16/2024 3.94  3.81 - 5.12 Million/uL Final    Hemoglobin 09/16/2024 10.9 (L)  11.5 - 15.4 g/dL Final    Hematocrit 09/16/2024 33.6 (L)  34.8 - 46.1 % Final    MCV 09/16/2024 85  82 - 98 fL Final    MCH 09/16/2024 27.7  26.8 - 34.3 pg Final    MCHC 09/16/2024 32.4  31.4 - 37.4 g/dL Final    RDW 09/16/2024 13.7  11.6 - 15.1 % Final    Platelets 09/16/2024 294  149 - 390 Thousands/uL Final    MPV 09/16/2024 10.8  8.9 - 12.7 fL Final   Admission on 09/11/2024, Discharged on 09/11/2024   Component Date Value Ref Range Status    SARS-CoV-2 09/11/2024 Negative  Negative Final         INFLUENZA A PCR 09/11/2024 Negative  Negative Final         INFLUENZA B PCR 09/11/2024 Negative  Negative Final         RSV PCR 09/11/2024 Negative  Negative Final         STREP A PCR 09/11/2024 Not Detected  Not Detected Final        Lab on 08/23/2024   Component Date Value Ref Range Status    WBC 08/23/2024 9.28  4.31 - 10.16 Thousand/uL Final    RBC 08/23/2024 3.77 (L)  3.81 - 5.12 Million/uL Final    Hemoglobin 08/23/2024 10.6 (L)  11.5 - 15.4 g/dL Final    Hematocrit 08/23/2024 32.4 (L)  34.8 -  46.1 % Final    MCV 08/23/2024 86  82 - 98 fL Final    MCH 08/23/2024 28.1  26.8 - 34.3 pg Final    MCHC 08/23/2024 32.7  31.4 - 37.4 g/dL Final    RDW 08/23/2024 13.6  11.6 - 15.1 % Final    MPV 08/23/2024 10.5  8.9 - 12.7 fL Final    Platelets 08/23/2024 275  149 - 390 Thousands/uL Final    nRBC 08/23/2024 0  /100 WBCs Final    Segmented % 08/23/2024 75  43 - 75 % Final    Immature Grans % 08/23/2024 1  0 - 2 % Final    Lymphocytes % 08/23/2024 16  14 - 44 % Final    Monocytes % 08/23/2024 6  4 - 12 % Final    Eosinophils Relative 08/23/2024 2  0 - 6 % Final    Basophils Relative 08/23/2024 0  0 - 1 % Final    Absolute Neutrophils 08/23/2024 6.96  1.85 - 7.62 Thousands/µL Final    Absolute Immature Grans 08/23/2024 0.07  0.00 - 0.20 Thousand/uL Final    Absolute Lymphocytes 08/23/2024 1.48  0.60 - 4.47 Thousands/µL Final    Absolute Monocytes 08/23/2024 0.53  0.17 - 1.22 Thousand/µL Final    Eosinophils Absolute 08/23/2024 0.22  0.00 - 0.61 Thousand/µL Final    Basophils Absolute 08/23/2024 0.02  0.00 - 0.10 Thousands/µL Final    Syphilis Total Antibody 08/23/2024 Non-reactive  Non-Reactive Final    No serological evidence of infection with T. pallidum.  Early or incubating syphilis infection cannot be excluded.  Consider repeat testing based on clinical suspicion.   Ultrasound on 08/23/2024   Component Date Value Ref Range Status    Glucose 08/27/2024 170 (H)  70 - 134 mg/dL Final    <=134 Normal   135-179 Impaired glucose fasting. Perform 3 Hour Glucose Tolerance   >=180 Diagnosis Gestational Diabetes Mellitus   Appointment on 05/28/2024   Component Date Value Ref Range Status    Results 05/28/2024 Report   Final    AFP Maternal, Serum    TEST RESULTS (AFP) 05/28/2024 *Screen Negative*   Final    Gestational Age 05/28/2024 16.0  weeks Final    Gestat. Age Based On 05/28/2024 Ultrasound   Final                                  16.0 on 05/28/2024  Recalculations are not recommended when gestational dating by LMP  and  ultrasound are within 10 days.    Maternal Age At SOU[place]A 05/28/2024 28.7  yr Final    Race 05/28/2024    Final    Weight 05/28/2024 187  lbs Final    Insulin Dep. Diabetic 05/28/2024 No   Final    Multiple Gestation 05/28/2024 No   Final    AFP 05/28/2024 50.2  ng/mL Final    MSAFP Mom 05/28/2024 1.71   Final    Osb Risk 05/28/2024 1590   Final    MSAFP Interp 05/28/2024 Comment   Final    Interpretation: Screen Negative  This result is screen negative for fetal OSB. The AFP MoM calculated  is based on the gestational age provided. MS-AFP can identify up to  80% of open fetal neural tube defects. Closed neural tube defects and  some open defects may not be detected by this test. This test does  not screen for fetal Down Syndrome or Trisomy 18. If screening for  Down Syndrome or Trisomy 18 is desired, contact [facility] to discuss available options.  The American College of  Obstetricians and Gynecologists recommends amniocentesis be offered  to women age 35 and older.    AF, AFP Comments 05/28/2024 Comment   Final    Mirian Kuhn, Ph.D., [facility]  Director  References: Available Upon Request.  Multiples Of Median Cutoffs          For AFP Elevations  Green   2.5           Black    2.8  IDD         2.0           Twins    4.5          Abbreviation Definitions  IDD - Insulin Dep Diabetes  OSBR - Open Spina Bifida Risk  For further inquiries contact [employer]  [facility] at [phone].  This test was developed and its performance characteristics  determined by [facility]. It has not been cleared or approved  by the Food and Drug Administration.   Initial Prenatal on 05/28/2024   Component Date Value Ref Range Status    Case Report 05/28/2024    Final                    Value:Gynecologic Cytology Report                       Case: [id number]                                  Authorizing Provider:  Olena Eckert MD       Collected:           05/28/2024 1219              Ordering  Location:     [facility]      Received:            05/28/2024 1219                                     OB/GYN                                                                       First Screen:          Elina Ford                                                                  Specimen:    LIQUID-BASED PAP, SCREENING, Cervix                                                        Primary Interpretation 05/28/2024 Negative for intraepithelial lesion or malignancy   Final    Specimen Adequacy 05/28/2024 Satisfactory for evaluation. Endocervical/transformation zone component present.   Final    Note 05/28/2024    Final                    Value:Screening performed at [facility], [address] [facility] [address].        Additional Information 05/28/2024    Final                    Value:[employer]'s FDA approved ,  and ThinPrep Imaging Duo System are utilized with strict adherence to the 's instruction manual to prepare gynecologic and non-gynecologic cytology specimens for the production of ThinPrep slides as well as for gynecologic ThinPrep imaging. These processes have been validated by our laboratory and/or by the .  The Pap test is not a diagnostic procedure and should not be used as the sole means to detect cervical cancer. It is only a screening procedure to aid in the detection of cervical cancer and its precursors. Both false-negative and false-positive results have been experienced. Your patient's test result should be interpreted in this context together with the history and clinical findings.      Gross Description 05/28/2024    Final                    Value:A. 20 ml , colorless, cloudy received in a ThinPrep vial.      LMP 05/28/2024 1/10/2024   Final    N gonorrhoeae, DNA Probe 05/28/2024 Negative  Negative Final    Chlamydia trachomatis, DNA Probe 05/28/2024 Negative  Negative Final    HPV Other HR 05/28/2024 Negative  Negative Final    HPV types:  31,33,35,39,45,51,52,56,58,59,66 and 68 DNA are undetectable or below the pre-set threshold.    HPV16 05/28/2024 Negative  Negative Final    HPV18 05/28/2024 Negative  Negative Final         Risks/Benefits      Risks, Benefits And Possible Side Effects Of Medications:    Risks, benefits, and possible side effects of medications explained to Mone and she verbalizes understanding and agreement for treatment.    Controlled Medication Discussion:     Mone has been filling controlled prescriptions on time as prescribed according to [place] Prescription Drug Monitoring Program    Psychotherapy Provided:     Individual psychotherapy provided: No    Treatment Plan:    Completed and signed during the session: Yes - with Mone Stock MD 11/11/24    This note has been constructed using a voice recognition system. There may be translation, syntax, or grammatical errors. If you have any questions, please contact the dictating provider.

## 2024-11-08 NOTE — BH TREATMENT PLAN
TREATMENT PLAN - Medication Management        [facility]    Name and Date of Birth:  Mone Garces 29 y.o. 1995  Date of Treatment Plan: November 11, 2024  Diagnosis/Diagnoses:    1. Moderate episode of recurrent major depressive disorder (HCC)    2. VINEET (generalized anxiety disorder)        Strengths/Personal Resources for Self-Care: supportive family, ability to communicate needs, ability to understand psychiatric illness, average or above intelligence, general fund of knowledge, ability to negotiate basic needs, stable employment, willingness to work on problems    Area/Areas of need: anxiety symptoms, depressive symptoms    Long Term Goal:  control my anxiety a little more  Target Date: 6 months - May 11, 2025  Person/Persons responsible for completion of goal: Mone and Orlando Stock MD     Short Term Objective (s) - How will we reach this goal?:   Take medications as prescribed  Attend psychiatry appointments regularly  Follow up with medical providers  Target Date: 3 months  Person/Persons Responsible for Completion of Goal: Mone     Progress Towards Goals: Continuing treatment    Treatment Modality: medication management every 1-3 months as needed  Review due 180 days from date of this plan: May 10, 2025   Expected length of service: Ongoing treatment    My physician and I have developed this plan together, and I agree to work on the goals and objectives. I understand the treatment goals that were developed for my treatment.    The treatment plan was created between Orlando Stock MD and Mone Garces on 11/11/24 at 11:04 AM.

## 2024-11-11 ENCOUNTER — TELEMEDICINE (OUTPATIENT)
Dept: PSYCHIATRY | Facility: CLINIC | Age: 29
End: 2024-11-11
Payer: COMMERCIAL

## 2024-11-11 ENCOUNTER — TELEPHONE (OUTPATIENT)
Dept: PSYCHIATRY | Facility: CLINIC | Age: 29
End: 2024-11-11

## 2024-11-11 DIAGNOSIS — F33.1 MODERATE EPISODE OF RECURRENT MAJOR DEPRESSIVE DISORDER (HCC): ICD-10-CM

## 2024-11-11 DIAGNOSIS — F41.1 GAD (GENERALIZED ANXIETY DISORDER): Primary | ICD-10-CM

## 2024-11-11 PROCEDURE — 99214 OFFICE O/P EST MOD 30 MIN: CPT

## 2024-11-11 RX ORDER — ESCITALOPRAM OXALATE 10 MG/1
10 TABLET ORAL DAILY
Qty: 30 TABLET | Refills: 2 | Status: SHIPPED | OUTPATIENT
Start: 2024-11-11 | End: 2025-02-09

## 2024-11-11 NOTE — TELEPHONE ENCOUNTER
Writer called patient and LVM to call office to make f/u appt.  Transfer call to resident MR to make appt.     LS 11/11 next appt around 12/23/24

## 2024-11-12 ENCOUNTER — TELEPHONE (OUTPATIENT)
Dept: OTHER | Facility: HOSPITAL | Age: 29
End: 2024-11-12

## 2024-11-12 NOTE — TELEPHONE ENCOUNTER
BP @ 1525 113/82 . Marichuy STERLING told her to recheck BP & HR.    Called and made contact with patient @ 2100. Denies any headaches or dizziness. Said she will recheck her BP & HR again in 15-20 minutes.     No updated BP or HR.   2255 /93 . Provider Latanya Hong notified

## 2024-11-15 ENCOUNTER — TELEPHONE (OUTPATIENT)
Dept: OBGYN CLINIC | Facility: CLINIC | Age: 29
End: 2024-11-15

## 2024-11-15 NOTE — TELEPHONE ENCOUNTER
Patient has been noncompliant in BP monitoring program.  Has not submitted blood pressure readings since 11/9.  Called to remind patient to submit readings.  Left reminder message on voicemail to submit BP readings twice daily.

## 2024-11-18 ENCOUNTER — TELEPHONE (OUTPATIENT)
Facility: HOSPITAL | Age: 29
End: 2024-11-18

## 2024-11-18 ENCOUNTER — POSTPARTUM VISIT (OUTPATIENT)
Dept: OBGYN CLINIC | Facility: CLINIC | Age: 29
End: 2024-11-18

## 2024-11-18 VITALS
WEIGHT: 181 LBS | BODY MASS INDEX: 36.49 KG/M2 | SYSTOLIC BLOOD PRESSURE: 132 MMHG | OXYGEN SATURATION: 100 % | DIASTOLIC BLOOD PRESSURE: 82 MMHG | HEART RATE: 80 BPM | HEIGHT: 59 IN

## 2024-11-18 DIAGNOSIS — R73.09 ELEVATED GLUCOSE TOLERANCE TEST: ICD-10-CM

## 2024-11-18 DIAGNOSIS — O14.93 PRE-ECLAMPSIA IN THIRD TRIMESTER: ICD-10-CM

## 2024-11-18 DIAGNOSIS — Z98.891 S/P CESAREAN SECTION: Primary | ICD-10-CM

## 2024-11-18 PROCEDURE — 99024 POSTOP FOLLOW-UP VISIT: CPT | Performed by: OBSTETRICS & GYNECOLOGY

## 2024-11-18 NOTE — TELEPHONE ENCOUNTER
Patient has been noncompliant in BP monitoring program.  Has not submitted blood pressure readings since 11/12.  Called to remind patient to submit readings.  Patient's number disconnected. Reached out to emergency contact and left message reminding patient to submit blood pressures.

## 2024-11-18 NOTE — TELEPHONE ENCOUNTER
Mone is part of postpartum home BP monitoring program with a BP reading of 78/49 and ; complains of a headache and feeling off this afternoon after checking her BP. Denies chest pain or palpitations. She stopped taking Procardia 3 days ago independently without guidance from her physician. She is currently on Lexapro and Xanax without any recent adjustments. Has not been taking Flexeril that was recently ordered. Has a follow up appointment today with Dr. Eckert. Currently not at home to repeat her BP. Encouraged to stay well hydrated, repeat BP, seek medical attention if needed and to follow up with Dr. Eckert as scheduled. Routing note to Dr. Eckert and \[facility] [place] [facility]\"" [facility] clinical [place].

## 2024-11-18 NOTE — PROGRESS NOTES
"Subjective    Patient is a 30 yo  who presents today s/p 1LTCS on 24 due to maternal request and suspected fetal macrosomia. Her delivery was complicated by a postpartum hemorrhage. She also met criteria for preeclampsia without severe features; she was discharged on a 5 day course of lasix and 30 mg of Procardia XL daily, as well as being entered in the blood pressure monitoring program. Earlier today she was noted to be hypotensive with a BP of 78/49; she reported a headache and feeling off. She had stopped taking her Procardia 3 days ago. Her repeat blood pressure was elevated. A Nexplanon was also inserted prior to discharge.    OB History          4    Para   3    Term   3            AB   1    Living   3         SAB        IAB   1    Ectopic        Multiple   0    Live Births   3           Obstetric Comments   Menarche 12  Cycles Q 28d  Vaginal birth x 1                      Objective    Vitals:    24 1654   BP: 132/82   BP Location: Right arm   Patient Position: Sitting   Cuff Size: Standard   Pulse: 80   SpO2: 100%   Weight: 82.1 kg (181 lb)   Height: 4' 11\" (1.499 m)        OBGyn Exam     Assessment    Patient Active Problem List   Diagnosis    Obesity (BMI 30-39.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 of cervix    Migraine without aura and without status migrainosus, not intractable    Chronic midline low back pain without sciatica    Chronic fatigue    Dry eyes    Vitamin D deficiency    Nephrocalcinosis    Anxiety    Myofascial pain    Gestational diabetes    S/P  section    Short interval between pregnancies affecting pregnancy, antepartum    Hx of preeclampsia, prior pregnancy, currently pregnant    GBS bacteriuria    Susceptible to varicella (non-immune), currently pregnant    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    History of gestational diabetes in prior pregnancy, currently pregnant in third trimester    Elevated glucose tolerance test    Obesity " complicating pregnancy    Class 2 obesity due to excess calories without serious comorbidity with body mass index (BMI) of 37.0 to 37.9 in adult    Macrosomia affecting management of mother in third trimester    Pre-eclampsia in third trimester    Pregnancy complicated by fetal abdominal abnormality    Anemia    Postpartum hemorrhage    Preeclampsia    Postpartum hemorrhage        Plan  - 2 hour GTT ordered  - Incision C/D/I, small suture tail removed  - Return in 3 weeks for next postpartum visit, continue to check BP  - Schedule annual

## 2024-11-18 NOTE — TELEPHONE ENCOUNTER
Virtual Brief Visit  Name: Mone Garces      : 1995      MRN: [id number]  Encounter Provider: HALLIE Cline  Encounter Date: 2024   Encounter department: [facility]    This Visit is being completed by telephone. The Patient is located at {[facility] Virtual Patient Location:[address]} and in the following state in which I hold an active license {[facility] virtual patient location:[address]}    The patient was identified by name and date of birth. Mone Garces was informed that this is a telemedicine visit and that the visit is being conducted through {AMB VIRTUAL VISIT MEDIUM:00032}.  {Telemedicine confidentiality :22820} {Telemedicine participants:34708}  She acknowledged consent and understanding of privacy and security of the video platform. The patient has agreed to participate and understands they can discontinue the visit at any time.    Patient is aware this is a billable service.     :Assessment & Plan        History of Present Illness   HPI    Visit Time  Total Visit Duration: ***

## 2024-11-25 ENCOUNTER — TELEPHONE (OUTPATIENT)
Dept: BARIATRICS | Facility: CLINIC | Age: 29
End: 2024-11-25

## 2024-11-25 NOTE — TELEPHONE ENCOUNTER
Attempted to contact pt to reschedule follow up apt due to no show. Recording states pt has calling restrictions and call cannot be completed

## 2024-12-06 ENCOUNTER — PATIENT MESSAGE (OUTPATIENT)
Dept: OBGYN CLINIC | Facility: CLINIC | Age: 29
End: 2024-12-06

## 2024-12-09 ENCOUNTER — PATIENT MESSAGE (OUTPATIENT)
Dept: OBGYN CLINIC | Facility: CLINIC | Age: 29
End: 2024-12-09

## 2024-12-10 ENCOUNTER — NURSE TRIAGE (OUTPATIENT)
Age: 29
End: 2024-12-10

## 2024-12-10 NOTE — TELEPHONE ENCOUNTER
"Regardin wks high BPreading  ----- Message from Franchesca CAMPBELL sent at 12/10/2024  8:23 AM EST -----  Patient wrote in last night with high BP reading and she is 38 wks    \"I was told to report my blood pressure reading. Today I felt off and took it and its concerning 172/146 pulse 103. \"    "

## 2024-12-10 NOTE — TELEPHONE ENCOUNTER
3rd attempt to contact Hiliary. L/M On v/m to return call  Reason for Disposition  • Third attempt to contact caller AND no contact made. Phone number verified.    Answer Assessment - Initial Assessment Questions  Attempted to contact patient x 3 unsuccessfully. Has appt today at 1pm    Protocols used: No Contact or Duplicate Contact Call-Adult-[place]

## 2024-12-11 ENCOUNTER — TELEPHONE (OUTPATIENT)
Age: 29
End: 2024-12-11

## 2024-12-11 NOTE — TELEPHONE ENCOUNTER
Patient has been noncompliant in BP monitoring program.  Has not submitted blood pressure readings since 11/27/24.  Called to remind patient to submit readings.  Left reminder message on voicemail to submit BP readings twice daily.

## 2024-12-13 NOTE — TELEPHONE ENCOUNTER
Letter written to patient to call the office to schedule an appt for BP follow up per Dr. Fournier.  Will have office print and mail letter certified.

## 2024-12-16 ENCOUNTER — PATIENT MESSAGE (OUTPATIENT)
Dept: OBGYN CLINIC | Facility: CLINIC | Age: 29
End: 2024-12-16

## 2024-12-30 ENCOUNTER — OFFICE VISIT (OUTPATIENT)
Dept: OBGYN CLINIC | Facility: CLINIC | Age: 29
End: 2024-12-30

## 2024-12-30 VITALS
WEIGHT: 182 LBS | SYSTOLIC BLOOD PRESSURE: 132 MMHG | BODY MASS INDEX: 36.69 KG/M2 | DIASTOLIC BLOOD PRESSURE: 84 MMHG | HEIGHT: 59 IN

## 2024-12-30 DIAGNOSIS — Z98.891 S/P CESAREAN SECTION: Primary | ICD-10-CM

## 2024-12-30 PROCEDURE — 99024 POSTOP FOLLOW-UP VISIT: CPT | Performed by: OBSTETRICS & GYNECOLOGY

## 2024-12-31 PROBLEM — O09.899 SUSCEPTIBLE TO VARICELLA (NON-IMMUNE), CURRENTLY PREGNANT: Status: RESOLVED | Noted: 2024-04-26 | Resolved: 2024-12-31

## 2024-12-31 PROBLEM — Z86.32 HISTORY OF GESTATIONAL DIABETES IN PRIOR PREGNANCY, CURRENTLY PREGNANT IN THIRD TRIMESTER: Status: RESOLVED | Noted: 2024-08-23 | Resolved: 2024-12-31

## 2024-12-31 PROBLEM — R58 HEMORRHAGE: Status: RESOLVED | Noted: 2024-11-02 | Resolved: 2024-12-31

## 2024-12-31 PROBLEM — O35.8XX0 PREGNANCY COMPLICATED BY FETAL ABDOMINAL ABNORMALITY: Status: RESOLVED | Noted: 2024-10-31 | Resolved: 2024-12-31

## 2024-12-31 PROBLEM — Z28.39 SUSCEPTIBLE TO VARICELLA (NON-IMMUNE), CURRENTLY PREGNANT: Status: RESOLVED | Noted: 2024-04-26 | Resolved: 2024-12-31

## 2024-12-31 PROBLEM — O36.1930: Status: RESOLVED | Noted: 2024-04-26 | Resolved: 2024-12-31

## 2024-12-31 PROBLEM — R73.09 ELEVATED GLUCOSE TOLERANCE TEST: Status: RESOLVED | Noted: 2024-10-03 | Resolved: 2024-12-31

## 2024-12-31 PROBLEM — O36.63X0 MACROSOMIA AFFECTING MANAGEMENT OF MOTHER IN THIRD TRIMESTER: Status: RESOLVED | Noted: 2024-10-31 | Resolved: 2024-12-31

## 2024-12-31 PROBLEM — O09.899 SHORT INTERVAL BETWEEN PREGNANCIES AFFECTING PREGNANCY, ANTEPARTUM: Status: RESOLVED | Noted: 2024-04-26 | Resolved: 2024-12-31

## 2024-12-31 PROBLEM — O99.210 OBESITY COMPLICATING PREGNANCY: Status: RESOLVED | Noted: 2024-10-31 | Resolved: 2024-12-31

## 2024-12-31 PROBLEM — O09.299 HX OF PREECLAMPSIA, PRIOR PREGNANCY, CURRENTLY PREGNANT: Status: RESOLVED | Noted: 2024-04-26 | Resolved: 2024-12-31

## 2024-12-31 PROBLEM — O09.293 HISTORY OF GESTATIONAL DIABETES IN PRIOR PREGNANCY, CURRENTLY PREGNANT IN THIRD TRIMESTER: Status: RESOLVED | Noted: 2024-08-23 | Resolved: 2024-12-31

## 2024-12-31 PROBLEM — R82.71 GBS BACTERIURIA: Status: RESOLVED | Noted: 2024-04-26 | Resolved: 2024-12-31

## 2024-12-31 NOTE — PROGRESS NOTES
"Subjective:     Mone Garces is a 29 y.o.  female who presents for work clearance. She is 8 weeks from  on 24. She is doing well and denies issues with pain or bleeding. Her pregnancy/postpartum period was complicated by Pre-eclampsia but she is currently not on medication and normotensive.    Objective:    Vitals: Blood pressure 132/84, height 4' 11\" (1.499 m), weight 82.6 kg (182 lb), not currently breastfeeding.Body mass index is 36.76 kg/m².    Physical Exam  Constitutional:       Appearance: She is well-developed.   Cardiovascular:      Rate and Rhythm: Normal rate and regular rhythm.      Heart sounds: Normal heart sounds. No murmur heard.     No friction rub. No gallop.   Pulmonary:      Effort: Pulmonary effort is normal. No respiratory distress.      Breath sounds: No wheezing.   Abdominal:      Palpations: Abdomen is soft.      Tenderness: There is no abdominal tenderness.   Musculoskeletal:         General: No tenderness.   Neurological:      Mental Status: She is alert and oriented to person, place, and time.   Vitals reviewed.         Assessment/Plan:    Problem List Items Addressed This Visit       S/P  section - Primary    May return to work with  no restrictions on 25              Anneliese Tubbs MD  2024  9:10 AM        "

## 2025-01-25 ENCOUNTER — APPOINTMENT (EMERGENCY)
Dept: RADIOLOGY | Facility: HOSPITAL | Age: 30
End: 2025-01-25
Payer: COMMERCIAL

## 2025-01-25 ENCOUNTER — HOSPITAL ENCOUNTER (EMERGENCY)
Facility: HOSPITAL | Age: 30
Discharge: HOME | End: 2025-01-25
Attending: EMERGENCY MEDICINE
Payer: COMMERCIAL

## 2025-01-25 VITALS
HEART RATE: 98 BPM | TEMPERATURE: 99.3 F | OXYGEN SATURATION: 98 % | DIASTOLIC BLOOD PRESSURE: 103 MMHG | RESPIRATION RATE: 18 BRPM | BODY MASS INDEX: 36.84 KG/M2 | SYSTOLIC BLOOD PRESSURE: 177 MMHG | WEIGHT: 182.4 LBS

## 2025-01-25 DIAGNOSIS — J10.1 INFLUENZA A: Primary | ICD-10-CM

## 2025-01-25 LAB
EXT PREGNANCY TEST URINE: NEGATIVE
EXT. CONTROL: NORMAL
FLUAV AG UPPER RESP QL IA.RAPID: POSITIVE
FLUBV AG UPPER RESP QL IA.RAPID: NEGATIVE
SARS-COV+SARS-COV-2 AG RESP QL IA.RAPID: NEGATIVE

## 2025-01-25 PROCEDURE — 96372 THER/PROPH/DIAG INJ SC/IM: CPT

## 2025-01-25 PROCEDURE — 87804 INFLUENZA ASSAY W/OPTIC: CPT

## 2025-01-25 PROCEDURE — 99284 EMERGENCY DEPT VISIT MOD MDM: CPT

## 2025-01-25 PROCEDURE — 81025 URINE PREGNANCY TEST: CPT

## 2025-01-25 PROCEDURE — 71046 X-RAY EXAM CHEST 2 VIEWS: CPT

## 2025-01-25 PROCEDURE — 87811 SARS-COV-2 COVID19 W/OPTIC: CPT

## 2025-01-25 RX ORDER — ACETAMINOPHEN 325 MG/1
975 TABLET ORAL ONCE
Status: COMPLETED | OUTPATIENT
Start: 2025-01-25 | End: 2025-01-25

## 2025-01-25 RX ORDER — KETOROLAC TROMETHAMINE 30 MG/ML
15 INJECTION, SOLUTION INTRAMUSCULAR; INTRAVENOUS ONCE
Status: COMPLETED | OUTPATIENT
Start: 2025-01-25 | End: 2025-01-25

## 2025-01-25 RX ADMIN — ACETAMINOPHEN 975 MG: 325 TABLET, FILM COATED ORAL at 17:35

## 2025-01-25 RX ADMIN — KETOROLAC TROMETHAMINE 15 MG: 30 INJECTION, SOLUTION INTRAMUSCULAR; INTRAVENOUS at 18:12

## 2025-01-25 NOTE — ED NOTES
Pt aware of need for urine sample, unable to provide at this time. Urine sample cup remains at bedside.       Suni Westbrook RN  01/25/25 2409

## 2025-01-25 NOTE — ED PROVIDER NOTES
Time reflects when diagnosis was documented in both MDM as applicable and the Disposition within this note       Time User Action Codes Description Comment    1/25/2025  6:12 PM Radha Lynn Add [J10.1] Influenza A           ED Disposition       ED Disposition   Discharge    Condition   Stable    Date/Time   Sat Jan 25, 2025  6:18 PM    Comment   Mone Garces discharge to home/self care.                   Assessment & Plan       Medical Decision Making  The patient is stable and has a history and physical exam consistent with a viral illness. COVID19/Influenza testing has been performed.  No respiratory distress.  No focal lung findings.  Wet read of chest x-ray without evidence of pneumonia.  Low clinical suspicion for RPA/PTA given exam findings. centor score of 0, do not feel strep testing is necessary at this time. No overt indications for antibiotics.   I considered the patient's other medical conditions as applicable/noted above in my medical decision making.  Patient found to be positive for influenza A. Had discussion regarding Tamiflu, at this time patient declines the patient improved upon discharge. Fever improved with antipyretic. The plan is for supportive care at home.    The patient (and any family present) verbalized understanding of the discharge instructions and warnings that would necessitate return to the Emergency Department.  All questions were answered prior to discharge.    The patients history of indicative of asymptomatic hypertension.     No chest pain/ exertional sob to suggest cardiac ischemia.  Non pregnant, not HELLP/preeclampsia.   No pleuritic pain.   No sweats. No vomiting.   No ripping tearing chest pain or chest/abdominal pain to suggest dissection.   No decrease in urine output.   No severe headache to suggest ICH.   No altered mental status.   No crackles on lungs or sob to suggest pulm edema.   No seizures.   No vision changes or neurological symptoms to  suggest stroke or retinal hemorrhage.     Per current ACEP guidelines, immediate lowering of BP and/or further workup not indicated at this time. Will follow up with PCP. Strict return precautions discussed.     Amount and/or Complexity of Data Reviewed  Labs: ordered. Decision-making details documented in ED Course.  Radiology: ordered and independent interpretation performed.    Risk  OTC drugs.  Prescription drug management.        ED Course as of 01/25/25 1911   Sat Jan 25, 2025   1748 Cough, congestion, loss of taste and smell, fever x 1 day.     Denies hx of asthma or immunocompromise. Does report history of elevated blood pressure, states she is on a monitoring program.     Tyenol taken at 11 am.    1804 Influenza A Rapid Antigen(!): Positive   1804 Influenza A Rapid Antigen(!): Positive  Had discussion regarding tamiflu, at this time patient declines.    1809 Imaging review done by myself and preliminary results were discussed with the patient and family members.  Discussion was had with patient and family members that this read is preliminary and therefore discrepancies between this read and the final read by the radiologist are possible.  Patient and family members were informed that any discrepancies found by would be relayed by phone call.        Medications   acetaminophen (TYLENOL) tablet 975 mg (975 mg Oral Given 1/25/25 1735)   ketorolac (TORADOL) injection 15 mg (15 mg Intramuscular Given 1/25/25 1812)       ED Risk Strat Scores                          SBIRT 22yo+      Flowsheet Row Most Recent Value   Initial Alcohol Screen: US AUDIT-C     1. How often do you have a drink containing alcohol? 1 Filed at: 01/25/2025 1724   2. How many drinks containing alcohol do you have on a typical day you are drinking?  2 Filed at: 01/25/2025 1724   3a. Male UNDER 65: How often do you have five or more drinks on one occasion? 0 Filed at: 01/25/2025 1724   3b. FEMALE Any Age, or MALE 65+: How often do you have 4  or more drinks on one occassion? 0 Filed at: 2025 172   Audit-C Score 3 Filed at: 2025 172   JOSEF: How many times in the past year have you...    Used an illegal drug or used a prescription medication for non-medical reasons? Never Filed at: 2025 1724                            History of Present Illness       Chief Complaint   Patient presents with    Medical Problem     No sense taste or smell, cough, congestion, body aches, headache since yesterday       Past Medical History:   Diagnosis Date    Abnormal Pap smear of cervix     2021 Colpo: TORRES 1; 2022 pap negative    Depression     Frequent headaches     Genetic screening     2023-CFCS negative    Immunization, varicella     Migraine      (normal spontaneous vaginal delivery)     Varicella     states she was vaccinated but blood work from 3/2021 says non immune      Past Surgical History:   Procedure Laterality Date    INDUCED       ME  DELIVERY ONLY N/A 2024    Procedure:  SECTION ();  Surgeon: Olena Eckert MD;  Location: [facility];  Service: Obstetrics      Family History   Problem Relation Age of Onset    Hyperlipidemia Mother     Diabetes Mother     Hypertension Mother     Kidney disease Father     Nephrolithiasis Sister     No Known Problems Sister     No Known Problems Brother     No Known Problems Brother     No Known Problems Brother     Diabetes Maternal Grandmother     COPD Maternal Grandmother     Alcohol abuse Maternal Grandfather     Diabetes Paternal Grandmother     HIV Paternal Grandfather     No Known Problems Daughter     No Known Problems Family     Breast cancer Neg Hx     Colon cancer Neg Hx     Ovarian cancer Neg Hx     Cancer Neg Hx     Pulmonary embolism Neg Hx     Heart attack Neg Hx     Stroke Neg Hx     Miscarriages / Stillbirths Neg Hx     Seizures Neg Hx     Thyroid disease Neg Hx     Transient ischemic attack Neg Hx       Social History     Tobacco Use     Smoking status: Never     Passive exposure: Never    Smokeless tobacco: Never   Vaping Use    Vaping status: Never Used   Substance Use Topics    Alcohol use: Yes     Comment: social; none since knowledge of pregnancy    Drug use: Never      E-Cigarette/Vaping    E-Cigarette Use Never User       E-Cigarette/Vaping Substances    Nicotine No     THC No     CBD No     Flavoring No     Other No     Unknown No       I have reviewed and agree with the history as documented.       Medical Problem      Review of Systems        Objective       ED Triage Vitals   Temperature Pulse Blood Pressure Respirations SpO2 Patient Position - Orthostatic VS   01/25/25 1713 01/25/25 1713 01/25/25 1713 01/25/25 1713 01/25/25 1713 01/25/25 1713   (!) 100.8 °F (38.2 °C) 104 (!) 174/110 18 98 % Sitting      Temp Source Heart Rate Source BP Location FiO2 (%) Pain Score    01/25/25 1713 01/25/25 1713 01/25/25 1713 -- 01/25/25 1735    Oral Monitor Left arm  7      Vitals      Date and Time Temp Pulse SpO2 Resp BP Pain Score FACES Pain Rating User   01/25/25 1818 99.3 °F (37.4 °C) -- -- -- -- -- -- RS   01/25/25 1812 -- -- -- -- -- 7 -- RS   01/25/25 1810 -- 98 98 % -- 177/103 -- -- RS   01/25/25 1735 -- -- -- -- -- 7 -- RS   01/25/25 1727 -- 94 98 % -- 176/104 -- -- RS   01/25/25 1713 100.8 °F (38.2 °C) 104 98 % 18 174/110 -- -- CJ            Physical Exam    Results Reviewed       Procedure Component Value Units Date/Time    POCT pregnancy, urine [[id number]]  (Normal) Collected: 01/25/25 1812    Lab Status: Final result Updated: 01/25/25 1812     EXT Preg Test, Ur Negative     Control Valid    FLU/COVID Rapid Antigen (30 min. TAT) - Preferred screening test in ED [[id number]]  (Abnormal) Collected: 01/25/25 1736    Lab Status: Final result Specimen: Nares from Nose Updated: 01/25/25 1803     SARS COV Rapid Antigen Negative     Influenza A Rapid Antigen Positive     Influenza B Rapid Antigen Negative    Narrative:      This test has been  performed using the Quidel Lanie 2 FLU+SARS Antigen test under the Emergency Use Authorization (EUA). This test has been validated by the  and verified by the performing laboratory. The Lanie uses lateral flow immunofluorescent sandwich assay to detect SARS-COV, Influenza A and Influenza B Antigen.     The Quidel Lanie 2 SARS Antigen test does not differentiate between SARS-CoV and SARS-CoV-2.     Negative results are presumptive and may be confirmed with a molecular assay, if necessary, for patient management. Negative results do not rule out SARS-CoV-2 or influenza infection and should not be used as the sole basis for treatment or patient management decisions. A negative test result may occur if the level of antigen in a sample is below the limit of detection of this test.     Positive results are indicative of the presence of viral antigens, but do not rule out bacterial infection or co-infection with other viruses.     All test results should be used as an adjunct to clinical observations and other information available to the provider.    FOR PEDIATRIC PATIENTS - copy/paste COVID Guidelines URL to browser: [email or web address]            XR chest 2 views   ED Interpretation by Radha Lynn PA-C (01/25 1809)   No focal consolidation. No PTX. No effusion.           Procedures    ED Medication and Procedure Management   Prior to Admission Medications   Prescriptions Last Dose Informant Patient Reported? Taking?   ALPRAZolam (XANAX) 0.5 mg tablet 1/24/2025  No Yes   Sig: Take 1 tablet (0.5 mg total) by mouth 2 (two) times a day as needed for anxiety   acetaminophen (TYLENOL) 325 mg tablet 1/25/2025  No Yes   Sig: Take 2 tablets (650 mg total) by mouth every 6 (six) hours   cyclobenzaprine (FLEXERIL) 10 mg tablet More than a month  No No   Sig: Take 1 tablet (10 mg total) by mouth 3 (three) times a day as needed for muscle spasms   escitalopram  (LEXAPRO) 20 mg tablet 1/25/2025  No Yes   Sig: TAKE 1 TABLET BY MOUTH EVERY DAY   escitalopram (Lexapro) 10 mg tablet   No No   Sig: Take 1 tablet (10 mg total) by mouth daily Take 1 tablet by mouth each day with 20 mg tablet for total of 30 mg daily   ibuprofen (MOTRIN) 600 mg tablet 1/25/2025  No Yes   Sig: Take 1 tablet (600 mg total) by mouth every 6 (six) hours      Facility-Administered Medications: None     Discharge Medication List as of 1/25/2025  6:19 PM        CONTINUE these medications which have NOT CHANGED    Details   acetaminophen (TYLENOL) 325 mg tablet Take 2 tablets (650 mg total) by mouth every 6 (six) hours, Starting Sun 11/3/2024, Normal      ALPRAZolam (XANAX) 0.5 mg tablet Take 1 tablet (0.5 mg total) by mouth 2 (two) times a day as needed for anxiety, Starting u 11/7/2024, Normal      !! escitalopram (LEXAPRO) 20 mg tablet TAKE 1 TABLET BY MOUTH EVERY DAY, Starting Mon 10/7/2024, Normal      ibuprofen (MOTRIN) 600 mg tablet Take 1 tablet (600 mg total) by mouth every 6 (six) hours, Starting Sun 11/3/2024, Normal      cyclobenzaprine (FLEXERIL) 10 mg tablet Take 1 tablet (10 mg total) by mouth 3 (three) times a day as needed for muscle spasms, Starting u 11/7/2024, Normal      !! escitalopram (Lexapro) 10 mg tablet Take 1 tablet (10 mg total) by mouth daily Take 1 tablet by mouth each day with 20 mg tablet for total of 30 mg daily, Starting Mon 11/11/2024, Until Sun 2/9/2025, Normal       !! - Potential duplicate medications found. Please discuss with provider.        No discharge procedures on file.  ED SEPSIS DOCUMENTATION   Time reflects when diagnosis was documented in both MDM as applicable and the Disposition within this note       Time User Action Codes Description Comment    1/25/2025  6:12 PM Radha Lynn Add [J10.1] Influenza A                  Radha Lynn PA-C  01/25/25 1911

## 2025-01-25 NOTE — DISCHARGE INSTRUCTIONS
Tylenol, motrin as needed for fever. Avoid medications that will increase blood pressure such as dextromethorphan. Follow up with PCP. Return to ED for new or worsening symptoms as discussed.

## 2025-01-25 NOTE — Clinical Note
Mone Garces was seen and treated in our emergency department on 1/25/2025.            may return if fever free for 24 hours without the use of fever reducing medications such as tylenol, motrin    Diagnosis: influenza    Mone  .    She may return on this date: 01/31/2025         If you have any questions or concerns, please don't hesitate to call.      Radha Lynn PA-C    ______________________________           _______________          _______________  Hospital Representative                              Date                                Time

## 2025-01-27 NOTE — BH CRISIS PLAN
Client Name: Mone Garces       Client YOB: 1995    [facility] Safety Plan      Creation Date: 2/16/24 Update Date: 2/16/24   Created By: Malena Samuel MD Last Updated By: Malena Samuel MD      Step 1: Warning Signs:   Warning Signs   high anxiety levels, anger            Step 2: Internal Coping Strategies:   Internal Coping Strategies   walk, drive, watch TV            Step 3: People and social settings that provide distraction:   Name Contact Information   Suzanne Hester (mother) [phone]    Places   Mother's place           Step 4: People whom I can ask for help during a crisis:      Name Contact Information    Dacia Wilhelm (sister) [phone]      Step 5: Professionals or agencies I can contact during a crisis:      Clinican/Agency Name Phone Emergency Contact    [facility] [phone]     Clarisse Magallanes NP        Local Emergency Department Emergency Department Phone Emergency Department Address    [facility] [phone] [address] [place] [address]        Crisis Phone Numbers:   Suicide Prevention Lifeline: Call or Text  331 Crisis Text Line: Text HOME to 817-352   Please note: Some [facility] do not have a separate number for Child/Adolescent specific crisis. If your county is not listed under Child/Adolescent, please call the adult number for your county      Adult Crisis Numbers: Child/Adolescent Crisis Numbers   [facility]: [phone] [facility]: [phone]   [facility]: [phone] [facility]: [phone]   [facility]: [phone] [place]: [phone]   [facility]: [phone] [facility]: [phone]   [facility]: [phone]   [facility]: [phone]   [facility]: [phone]   [facility]: [phone] (daytime) [phone] (after hours, weekends, holidays)      Step 6: Making the environment safer (plan for lethal  means safety):   Plan: Secure firearms, ask significant other     Optional: What is most important to me and worth living for?   Children, family     [place] Safety Plan. Nikole Gallegos and Gurinder Tubbs. Used with permission of the authors.

## 2025-01-27 NOTE — PSYCH
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 Identification and Antibody Titer have been ordered.    Specimen Expiration Date 10/31/2024 [id number]   Final    POC Glucose 10/31/2024 114  65 - 140 mg/dl Final    POC Glucose 10/31/2024 89  65 - 140 mg/dl Final    ANTIBODY ID. #1 10/31/2024 Anti-K   Final    ANTIBODY ID. #2 10/31/2024 Anti-M   Final    ANTIBODY ID. #3 10/31/2024 Anti-S   Final    POC Glucose 10/31/2024 79  65 - 140 mg/dl Final    POC Glucose 10/31/2024 84  65 - 140 mg/dl Final    pH, Cord Art 11/01/2024 7.251  7.230 - 7.430 Final    pCO2, Cord Art 11/01/2024 56.6  30.0 - 60.0 Final    pO2, Cord Art 11/01/2024 17.7  5.0 - 25.0 mm HG Final    HCO3, Cord Art 11/01/2024 24.3  17.3 - 27.3 mmol/L Final    Base Exc, Cord Art 11/01/2024 -3.9 (L)  3.0 - 11.0 mmol/L Final    O2 Content, Cord Art 11/01/2024 8.3  ml/dl Final    O2 Hgb, Arterial Cord 11/01/2024 37.4  % Final     pH, Cord Kam 2024 7.343  7.190 - 7.490 Final    pCO2, Cord Kam 2024 40.7  27.0 - 43.0 mm HG Final    pO2, Cord Kam 2024 23.8  15.0 - 45.0 mm HG Final    HCO3, Cord Kam 2024 21.6  12.2 - 28.6 mmol/L Final    Base Exc, Cord Kam 2024 -3.8 (L)  1.0 - 9.0 mmol/L Final    O2 Cont, Cord Kam 2024 12.9  mL/dL Final    O2 HGB,VENOUS CORD 2024 60.6  % Final    POC Glucose 10/31/2024 74  65 - 140 mg/dl Final    Case Report 2024    Final                    Value:Surgical Pathology Report                         Case: [id number]                                  Authorizing Provider:  Olena Eckert MD       Collected:           2024 0144              Ordering Location:     [facility]        Received:            2024 [address] [place] [address] Labor and                                                                                 Delivery                                                                     Pathologist:           Cody Marley MD                                                           Specimen:    Placenta, FOR PATHOLOGY                                                                    Final Diagnosis 2024    Final                    Value:A. Placenta (441g),  section delivery:  - Intact mature placenta with eccentrically inserted, three-vessel umbilical cord  - No evidence of chorioamnionitis, umbilical vasculitis, funisitis, or villitis  - Placental size and weight about 25th percentile for gestational age of 38 weeks and 3 days with age-appropriate and uniform villous morphology  - Placental infarcts not present  - No fetal or maternal vascular abnormality or thrombosis  - No malformative condition and no evidence of neoplasia            Additional Information 2024    Final                    Value:All reported additional testing was performed with appropriately reactive controls.  These  "tests were developed and their performance characteristics determined by [facility] or appropriate performing facility, though some tests may be performed on tissues which have not been validated for performance characteristics (such as staining performed on alcohol exposed cell blocks and decalcified tissues).  Results should be interpreted with caution and in the context of the patients’ clinical condition. These tests may not be cleared or approved by the U.S. Food and Drug Administration, though the FDA has determined that such clearance or approval is not necessary. These tests are used for clinical purposes and they should not be regarded as investigational or for research. This laboratory has been approved by CLIA 88, designated as a high-complexity laboratory and is qualified to perform these tests.  .Interpretation performed at [facility], [facility] [address] [facility] [address]        Gross Description 11/01/2024    Final                    Value:A. The specimen is received in formalin, labeled with the patient's name and hospital number, and is designated \" placenta for pathology.\"  It consists of a 441 g (trimmed weight), 18.5 x 16 x 2.5 cm placenta with attached fetal membranes and umbilical cord.  The umbilical cord is eccentrically inserted 3 cm from the disc edge and is white-tan, glistening, and normally coiled.  It measures 17.5 cm in length, 1.4-2.3 cm in diameter and displays 3 vessels upon sectioning.  The fetal membranes are marginally inserted and are pink-tan, thin, and semitranslucent.  The fetal surface is blue-purple and well arborized with small caliber vessels.  The maternal surface is red-purple with no areas of disruption or adherent blood clot.  Sectioning reveals pink to pink-red, soft, spongy to dense parenchyma with no apparent lesions.  Representative sections are submitted as follows:  1: Umbilical cord  2: Fetal membranes  3-4: " Full-thickness sections    Note: The estimated total formalin fixation                           time based upon information provided by the submitting clinician and the standard processing schedule is under 72 hours.    MScheib      WBC 11/01/2024 13.61 (H)  4.31 - 10.16 Thousand/uL Final    RBC 11/01/2024 3.20 (L)  3.81 - 5.12 Million/uL Final    Hemoglobin 11/01/2024 8.5 (L)  11.5 - 15.4 g/dL Final    Hematocrit 11/01/2024 26.6 (L)  34.8 - 46.1 % Final    MCV 11/01/2024 83  82 - 98 fL Final    MCH 11/01/2024 26.6 (L)  26.8 - 34.3 pg Final    MCHC 11/01/2024 32.0  31.4 - 37.4 g/dL Final    RDW 11/01/2024 14.1  11.6 - 15.1 % Final    MPV 11/01/2024 10.8  8.9 - 12.7 fL Final    Platelets 11/01/2024 218  149 - 390 Thousands/uL Final    nRBC 11/01/2024 0  /100 WBCs Final    Segmented % 11/01/2024 79 (H)  43 - 75 % Final    Immature Grans % 11/01/2024 1  0 - 2 % Final    Lymphocytes % 11/01/2024 13 (L)  14 - 44 % Final    Monocytes % 11/01/2024 7  4 - 12 % Final    Eosinophils Relative 11/01/2024 0  0 - 6 % Final    Basophils Relative 11/01/2024 0  0 - 1 % Final    Absolute Neutrophils 11/01/2024 10.67 (H)  1.85 - 7.62 Thousands/µL Final    Absolute Immature Grans 11/01/2024 0.09  0.00 - 0.20 Thousand/uL Final    Absolute Lymphocytes 11/01/2024 1.81  0.60 - 4.47 Thousands/µL Final    Absolute Monocytes 11/01/2024 0.99  0.17 - 1.22 Thousand/µL Final    Eosinophils Absolute 11/01/2024 0.01  0.00 - 0.61 Thousand/µL Final    Basophils Absolute 11/01/2024 0.04  0.00 - 0.10 Thousands/µL Final    Sodium 11/01/2024 135  135 - 147 mmol/L Final    Potassium 11/01/2024 4.3  3.5 - 5.3 mmol/L Final    Chloride 11/01/2024 106  96 - 108 mmol/L Final    CO2 11/01/2024 24  21 - 32 mmol/L Final    ANION GAP 11/01/2024 5  4 - 13 mmol/L Final    BUN 11/01/2024 12  5 - 25 mg/dL Final    Creatinine 11/01/2024 0.73  0.60 - 1.30 mg/dL Final    Standardized to IDMS reference method    Glucose 11/01/2024 110  65 - 140 mg/dL Final    If the  patient is fasting, the ADA then defines impaired fasting glucose as > 100 mg/dL and diabetes as > or equal to 123 mg/dL.    Calcium 11/01/2024 8.6  8.4 - 10.2 mg/dL Final    Corrected Calcium 11/01/2024 9.6  8.3 - 10.1 mg/dL Final    AST 11/01/2024 13  13 - 39 U/L Final    ALT 11/01/2024 6 (L)  7 - 52 U/L Final    Specimen collection should occur prior to Sulfasalazine administration due to the potential for falsely depressed results.     Alkaline Phosphatase 11/01/2024 103  34 - 104 U/L Final    Total Protein 11/01/2024 5.5 (L)  6.4 - 8.4 g/dL Final    Albumin 11/01/2024 2.8 (L)  3.5 - 5.0 g/dL Final    Total Bilirubin 11/01/2024 0.28  0.20 - 1.00 mg/dL Final    Use of this assay is not recommended for patients undergoing treatment with eltrombopag due to the potential for falsely elevated results.  N-acetyl-p-benzoquinone imine (metabolite of Acetaminophen) will generate erroneously low results in samples for patients that have taken an overdose of Acetaminophen.    eGFR 11/01/2024 112  ml/min/1.73sq m Final    WBC 11/02/2024 12.76 (H)  4.31 - 10.16 Thousand/uL Final    RBC 11/02/2024 3.28 (L)  3.81 - 5.12 Million/uL Final    Hemoglobin 11/02/2024 8.8 (L)  11.5 - 15.4 g/dL Final    Hematocrit 11/02/2024 27.0 (L)  34.8 - 46.1 % Final    MCV 11/02/2024 82  82 - 98 fL Final    MCH 11/02/2024 26.8  26.8 - 34.3 pg Final    MCHC 11/02/2024 32.6  31.4 - 37.4 g/dL Final    RDW 11/02/2024 14.1  11.6 - 15.1 % Final    Platelets 11/02/2024 261  149 - 390 Thousands/uL Final    MPV 11/02/2024 10.8  8.9 - 12.7 fL Final    Sodium 11/02/2024 135  135 - 147 mmol/L Final    Potassium 11/02/2024 4.1  3.5 - 5.3 mmol/L Final    Chloride 11/02/2024 104  96 - 108 mmol/L Final    CO2 11/02/2024 26  21 - 32 mmol/L Final    ANION GAP 11/02/2024 5  4 - 13 mmol/L Final    BUN 11/02/2024 11  5 - 25 mg/dL Final    Creatinine 11/02/2024 0.62  0.60 - 1.30 mg/dL Final    Standardized to IDMS reference method    Glucose 11/02/2024 109  65 - 140  mg/dL Final    If the patient is fasting, the ADA then defines impaired fasting glucose as > 100 mg/dL and diabetes as > or equal to 123 mg/dL.    Calcium 11/02/2024 8.9  8.4 - 10.2 mg/dL Final    Corrected Calcium 11/02/2024 9.6  8.3 - 10.1 mg/dL Final    AST 11/02/2024 16  13 - 39 U/L Final    ALT 11/02/2024 7  7 - 52 U/L Final    Specimen collection should occur prior to Sulfasalazine administration due to the potential for falsely depressed results.     Alkaline Phosphatase 11/02/2024 101  34 - 104 U/L Final    Total Protein 11/02/2024 6.2 (L)  6.4 - 8.4 g/dL Final    Albumin 11/02/2024 3.1 (L)  3.5 - 5.0 g/dL Final    Total Bilirubin 11/02/2024 0.21  0.20 - 1.00 mg/dL Final    Use of this assay is not recommended for patients undergoing treatment with eltrombopag due to the potential for falsely elevated results.  N-acetyl-p-benzoquinone imine (metabolite of Acetaminophen) will generate erroneously low results in samples for patients that have taken an overdose of Acetaminophen.    eGFR 11/02/2024 123  ml/min/1.73sq m Final   Appointment on 09/16/2024   Component Date Value Ref Range Status    ABO Grouping 09/16/2024 B   Final    Rh Factor 09/16/2024 Positive   Final    Antibody Screen 09/16/2024 Negative   Final    Specimen Expiration Date 09/16/2024 [id number]   Final    WBC 09/16/2024 11.33 (H)  4.31 - 10.16 Thousand/uL Final    RBC 09/16/2024 3.94  3.81 - 5.12 Million/uL Final    Hemoglobin 09/16/2024 10.9 (L)  11.5 - 15.4 g/dL Final    Hematocrit 09/16/2024 33.6 (L)  34.8 - 46.1 % Final    MCV 09/16/2024 85  82 - 98 fL Final    MCH 09/16/2024 27.7  26.8 - 34.3 pg Final    MCHC 09/16/2024 32.4  31.4 - 37.4 g/dL Final    RDW 09/16/2024 13.7  11.6 - 15.1 % Final    Platelets 09/16/2024 294  149 - 390 Thousands/uL Final    MPV 09/16/2024 10.8  8.9 - 12.7 fL Final   Admission on 09/11/2024, Discharged on 09/11/2024   Component Date Value Ref Range Status    SARS-CoV-2 09/11/2024 Negative  Negative Final          INFLUENZA A PCR 09/11/2024 Negative  Negative Final         INFLUENZA B PCR 09/11/2024 Negative  Negative Final         RSV PCR 09/11/2024 Negative  Negative Final         STREP A PCR 09/11/2024 Not Detected  Not Detected Final        Lab on 08/23/2024   Component Date Value Ref Range Status    WBC 08/23/2024 9.28  4.31 - 10.16 Thousand/uL Final    RBC 08/23/2024 3.77 (L)  3.81 - 5.12 Million/uL Final    Hemoglobin 08/23/2024 10.6 (L)  11.5 - 15.4 g/dL Final    Hematocrit 08/23/2024 32.4 (L)  34.8 - 46.1 % Final    MCV 08/23/2024 86  82 - 98 fL Final    MCH 08/23/2024 28.1  26.8 - 34.3 pg Final    MCHC 08/23/2024 32.7  31.4 - 37.4 g/dL Final    RDW 08/23/2024 13.6  11.6 - 15.1 % Final    MPV 08/23/2024 10.5  8.9 - 12.7 fL Final    Platelets 08/23/2024 275  149 - 390 Thousands/uL Final    nRBC 08/23/2024 0  /100 WBCs Final    Segmented % 08/23/2024 75  43 - 75 % Final    Immature Grans % 08/23/2024 1  0 - 2 % Final    Lymphocytes % 08/23/2024 16  14 - 44 % Final    Monocytes % 08/23/2024 6  4 - 12 % Final    Eosinophils Relative 08/23/2024 2  0 - 6 % Final    Basophils Relative 08/23/2024 0  0 - 1 % Final    Absolute Neutrophils 08/23/2024 6.96  1.85 - 7.62 Thousands/µL Final    Absolute Immature Grans 08/23/2024 0.07  0.00 - 0.20 Thousand/uL Final    Absolute Lymphocytes 08/23/2024 1.48  0.60 - 4.47 Thousands/µL Final    Absolute Monocytes 08/23/2024 0.53  0.17 - 1.22 Thousand/µL Final    Eosinophils Absolute 08/23/2024 0.22  0.00 - 0.61 Thousand/µL Final    Basophils Absolute 08/23/2024 0.02  0.00 - 0.10 Thousands/µL Final    Syphilis Total Antibody 08/23/2024 Non-reactive  Non-Reactive Final    No serological evidence of infection with T. pallidum.  Early or incubating syphilis infection cannot be excluded.  Consider repeat testing based on clinical suspicion.   Ultrasound on 08/23/2024   Component Date Value Ref Range Status    Glucose 08/27/2024 170 (H)  70 - 134 mg/dL Final    <=134 Normal   135-179 Impaired  glucose fasting. Perform 3 Hour Glucose Tolerance   >=180 Diagnosis Gestational Diabetes Mellitus         Risks/Benefits      Risks, Benefits And Possible Side Effects Of Medications:    Risks, benefits, and possible side effects of medications explained to Mone and she verbalizes understanding and agreement for treatment.    Controlled Medication Discussion:     Mone has been filling controlled prescriptions on time as prescribed according to [place] Prescription Drug Monitoring Program    Psychotherapy Provided:     Individual psychotherapy provided: Crisis/safety plan discussed with Mone.    Treatment Plan:    Completed and signed during the session: Not due at this session        Orlando Stock MD 01/30/25    This note has been constructed using a voice recognition system. There may be translation, syntax, or grammatical errors. If you have any questions, please contact the dictating provider.

## 2025-01-29 ENCOUNTER — TELEPHONE (OUTPATIENT)
Dept: BARIATRICS | Facility: CLINIC | Age: 30
End: 2025-01-29

## 2025-01-30 ENCOUNTER — TELEMEDICINE (OUTPATIENT)
Dept: PSYCHIATRY | Facility: CLINIC | Age: 30
End: 2025-01-30

## 2025-01-30 DIAGNOSIS — Z91.199 NO-SHOW FOR APPOINTMENT: Primary | ICD-10-CM

## 2025-01-30 NOTE — PSYCH
No Call. No Show. No Charge    Mone Garces no showed 01/30/25 appointment , staff called and left message to reschedule appointment     Treatment Plan not due at this session.

## 2025-02-05 DIAGNOSIS — Z98.891 S/P CESAREAN SECTION: ICD-10-CM

## 2025-02-05 DIAGNOSIS — M54.50 CHRONIC MIDLINE LOW BACK PAIN WITHOUT SCIATICA: ICD-10-CM

## 2025-02-05 DIAGNOSIS — G89.29 CHRONIC MIDLINE LOW BACK PAIN WITHOUT SCIATICA: ICD-10-CM

## 2025-02-05 DIAGNOSIS — F41.9 ANXIETY: ICD-10-CM

## 2025-02-06 NOTE — TELEPHONE ENCOUNTER
Requested Prescriptions     Pending Prescriptions Disp Refills    ALPRAZolam (XANAX) 0.5 mg tablet 60 tablet 0     Sig: Take 1 tablet (0.5 mg total) by mouth 2 (two) times a day as needed for anxiety    cyclobenzaprine (FLEXERIL) 10 mg tablet 60 tablet 0     Sig: Take 1 tablet (10 mg total) by mouth 3 (three) times a day as needed for muscle spasms

## 2025-02-07 RX ORDER — IBUPROFEN 600 MG/1
600 TABLET, FILM COATED ORAL EVERY 6 HOURS
Qty: 60 TABLET | Refills: 0 | OUTPATIENT
Start: 2025-02-07

## 2025-02-07 RX ORDER — ACETAMINOPHEN 325 MG/1
650 TABLET ORAL EVERY 6 HOURS SCHEDULED
Qty: 60 TABLET | Refills: 0 | OUTPATIENT
Start: 2025-02-07

## 2025-02-07 RX ORDER — CYCLOBENZAPRINE HCL 10 MG
10 TABLET ORAL 3 TIMES DAILY PRN
Qty: 60 TABLET | Refills: 0 | OUTPATIENT
Start: 2025-02-07

## 2025-02-07 RX ORDER — ALPRAZOLAM 0.5 MG
0.5 TABLET ORAL 2 TIMES DAILY PRN
Qty: 60 TABLET | Refills: 0 | OUTPATIENT
Start: 2025-02-07

## 2025-02-11 ENCOUNTER — TELEPHONE (OUTPATIENT)
Dept: BARIATRICS | Facility: CLINIC | Age: 30
End: 2025-02-11

## 2025-02-12 ENCOUNTER — OFFICE VISIT (OUTPATIENT)
Dept: BARIATRICS | Facility: CLINIC | Age: 30
End: 2025-02-12
Payer: COMMERCIAL

## 2025-02-12 VITALS
BODY MASS INDEX: 37.62 KG/M2 | HEART RATE: 96 BPM | HEIGHT: 59 IN | TEMPERATURE: 98.1 F | RESPIRATION RATE: 16 BRPM | SYSTOLIC BLOOD PRESSURE: 142 MMHG | WEIGHT: 186.6 LBS | DIASTOLIC BLOOD PRESSURE: 90 MMHG

## 2025-02-12 DIAGNOSIS — E66.09 CLASS 2 OBESITY DUE TO EXCESS CALORIES WITHOUT SERIOUS COMORBIDITY WITH BODY MASS INDEX (BMI) OF 36.0 TO 36.9 IN ADULT: Primary | ICD-10-CM

## 2025-02-12 DIAGNOSIS — R03.0 ELEVATED BLOOD PRESSURE READING: ICD-10-CM

## 2025-02-12 DIAGNOSIS — E66.812 CLASS 2 OBESITY DUE TO EXCESS CALORIES WITHOUT SERIOUS COMORBIDITY WITH BODY MASS INDEX (BMI) OF 36.0 TO 36.9 IN ADULT: Primary | ICD-10-CM

## 2025-02-12 DIAGNOSIS — F41.9 ANXIETY: ICD-10-CM

## 2025-02-12 PROCEDURE — 99214 OFFICE O/P EST MOD 30 MIN: CPT | Performed by: PHYSICIAN ASSISTANT

## 2025-02-12 NOTE — PROGRESS NOTES
Assessment/Plan:    Class 2 obesity due to excess calories without serious comorbidity with body mass index (BMI) of 36.0 to 36.9 in adult  -Patient is pursuing Conservative Program  -Initial weight loss goal of 5-10% weight loss for improved health  -Screening labs done in pregnancy in December. A1c was in prediabetic range at that time    Initial:182.4 (last seen 2022)  Goal:130s    Since currently 3 month post partum will have her return for weight check in March and to start zepbound in April. To start on initial dose of medication and then to titrate as tolerated.  They have tried more than 6 months of lifestyle modifications including diet and activity changes and has had insignificant weight loss of less than 1 lb a week. Patient denies personal and family history of MCT and MEN2 tumors. Patient denies personal history of pancreatitis. Side effects discussed but not limited to diarrhea, bloating, constipation, GI upset, heartburn, increased heart rate, headache, low blood sugar, fatigue and dizziness. Titration and medication administration discussed.  Medication agreement signed 2/12/25 contraception: nexplanon    Recommendations;  To start light breakfast or protein shake  To continue water intake  Recommend more meal planning and reduce dining out  Discussed doing at home exercises and then walking in the future        Anxiety  On lexapro and xanax.  Will avoid medications that can increase anxiety        Return in about 5 months (around 7/10/2025) for end of march nurse visit .       Diagnoses and all orders for this visit:    Class 2 obesity due to excess calories without serious comorbidity with body mass index (BMI) of 36.0 to 36.9 in adult    Anxiety    Elevated blood pressure reading-to continue to monitor BP    Other orders  -     etonogestrel (NEXPLANON) subdermal implant; 68 mg by Subdermal route Once every 3 years          Subjective:   Chief Complaint   Patient presents with    Follow-up     MWNICOLAS  F/u; Waist 42in        Patient ID: Mone Garces  is a 29 y.o. female with excess weight/obesity here to pursue weight managment.  Patient is pursuing Conservative Program.     HPI  She was on phentermine in the past.Last seen 10/2022.  Had 2 children since that time  .Last birth 25.   Recently saw [facility] bariat[facility]. 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  Has not been consistent with exercise due to new baby    She had elevated blood pressure right before birth and was on nifedipine.  BP is being monitored.    Wt Readings from Last 10 Encounters:   25 84.6 kg (186 lb 9.6 oz)   25 82.7 kg (182 lb 6.4 oz)   24 82.6 kg (182 lb)   24 82.1 kg (181 lb)   24 83.9 kg (185 lb)   10/31/24 91.4 kg (201 lb 6.4 oz)   10/31/24 91.4 kg (201 lb 6.4 oz)   10/11/24 89.8 kg (198 lb)   10/03/24 90.3 kg (199 lb)   24 87.8 kg (193 lb 9.6 oz)       Food logging:  Increased appetite/cravings:  Exercise:nothing formal  Hydration:water , diet soda,  coffee 1 cup    Diet Recall:  B: Skips  L (3PM):  The following portions of the patient's history were reviewed and updated as appropriate: She  has a past medical history of Abnormal Pap smear of cervix, Depression, Frequent headaches, Genetic screening, Immunization, varicella, Migraine,  (normal spontaneous vaginal delivery), and Varicella.  She   Patient Active Problem List    Diagnosis Date Noted    Preeclampsia 2024    Class 2 obesity due to excess calories without serious comorbidity with body mass index (BMI) of 36.0 to 36.9 in adult 10/31/2024    Pre-eclampsia in third trimester 10/31/2024    Anemia 10/31/2024    S/P  section 2024    Gestational diabetes 10/06/2023    Myofascial pain 10/04/2022    Anxiety 2022    Nephrocalcinosis 2022    Vitamin D deficiency 2022    Chronic fatigue 2022    Dry eyes 2022    Migraine without aura and without status migrainosus,  not intractable 2021    Chronic midline low back pain without sciatica 2021    LGSIL on Pap smear of cervix 2019    Obesity (BMI 30-39.9) 2018     She  has a past surgical history that includes Induced  () and pr  delivery only (N/A, 2024).  Her family history includes Alcohol abuse in her maternal grandfather; COPD in her maternal grandmother; Diabetes in her maternal grandmother, mother, and paternal grandmother; HIV in her paternal grandfather; Hyperlipidemia in her mother; Hypertension in her mother; Kidney disease in her father; Nephrolithiasis in her sister; No Known Problems in her brother, brother, brother, daughter, family, and sister.  She  reports that she has never smoked. She has never been exposed to tobacco smoke. She has never used smokeless tobacco. She reports current alcohol use. She reports that she does not use drugs.  Current Outpatient Medications   Medication Sig Dispense Refill    acetaminophen (TYLENOL) 325 mg tablet Take 2 tablets (650 mg total) by mouth every 6 (six) hours (Patient taking differently: Take 650 mg by mouth if needed) 60 tablet 0    ALPRAZolam (XANAX) 0.5 mg tablet Take 1 tablet (0.5 mg total) by mouth 2 (two) times a day as needed for anxiety 60 tablet 3    cyclobenzaprine (FLEXERIL) 10 mg tablet Take 1 tablet (10 mg total) by mouth 3 (three) times a day as needed for muscle spasms 60 tablet 0    escitalopram (LEXAPRO) 20 mg tablet TAKE 1 TABLET BY MOUTH EVERY DAY 30 tablet 2    etonogestrel (NEXPLANON) subdermal implant 68 mg by Subdermal route Once every 3 years      ibuprofen (MOTRIN) 600 mg tablet Take 1 tablet (600 mg total) by mouth every 6 (six) hours (Patient taking differently: Take 600 mg by mouth if needed) 60 tablet 0    escitalopram (Lexapro) 10 mg tablet Take 1 tablet (10 mg total) by mouth daily Take 1 tablet by mouth each day with 20 mg tablet for total of 30 mg daily (Patient not taking: Reported on  "2/12/2025) 30 tablet 2     No current facility-administered medications for this visit.     Current Outpatient Medications on File Prior to Visit   Medication Sig    acetaminophen (TYLENOL) 325 mg tablet Take 2 tablets (650 mg total) by mouth every 6 (six) hours (Patient taking differently: Take 650 mg by mouth if needed)    ALPRAZolam (XANAX) 0.5 mg tablet Take 1 tablet (0.5 mg total) by mouth 2 (two) times a day as needed for anxiety    cyclobenzaprine (FLEXERIL) 10 mg tablet Take 1 tablet (10 mg total) by mouth 3 (three) times a day as needed for muscle spasms    escitalopram (LEXAPRO) 20 mg tablet TAKE 1 TABLET BY MOUTH EVERY DAY    etonogestrel (NEXPLANON) subdermal implant 68 mg by Subdermal route Once every 3 years    ibuprofen (MOTRIN) 600 mg tablet Take 1 tablet (600 mg total) by mouth every 6 (six) hours (Patient taking differently: Take 600 mg by mouth if needed)    escitalopram (Lexapro) 10 mg tablet Take 1 tablet (10 mg total) by mouth daily Take 1 tablet by mouth each day with 20 mg tablet for total of 30 mg daily (Patient not taking: Reported on 2/12/2025)     No current facility-administered medications on file prior to visit.     She has no known allergies..    Review of Systems   Constitutional:  Negative for fever.   Respiratory:  Negative for shortness of breath.    Cardiovascular:  Negative for chest pain and palpitations.   Gastrointestinal:  Negative for abdominal pain, constipation, diarrhea and vomiting.   Genitourinary:  Negative for difficulty urinating.   Musculoskeletal:  Negative for arthralgias and back pain.   Skin:  Negative for rash.   Neurological:  Negative for headaches.   Psychiatric/Behavioral:  Positive for dysphoric mood (sometimes). The patient is not nervous/anxious.        Objective:    /90 (BP Location: Left arm, Patient Position: Sitting)   Pulse 96   Temp 98.1 °F (36.7 °C) (Tympanic)   Resp 16   Ht 4' 11\" (1.499 m)   Wt 84.6 kg (186 lb 9.6 oz)   BMI 37.69 " kg/m²      Physical Exam  Vitals and nursing note reviewed.   Constitutional:       General: She is not in acute distress.     Appearance: She is well-developed. She is obese.   HENT:      Head: Normocephalic and atraumatic.   Eyes:      Conjunctiva/sclera: Conjunctivae normal.   Neck:      Thyroid: No thyromegaly.   Pulmonary:      Effort: Pulmonary effort is normal. No respiratory distress.   Skin:     Findings: No rash (visible).   Neurological:      Mental Status: She is alert and oriented to person, place, and time.   Psychiatric:         Mood and Affect: Mood normal.         Behavior: Behavior normal.

## 2025-02-12 NOTE — ASSESSMENT & PLAN NOTE
-Patient is pursuing Conservative Program  -Initial weight loss goal of 5-10% weight loss for improved health  -Screening labs done in pregnancy in December. A1c was in prediabetic range at that time    Initial:182.4 (last seen 2022)  Goal:130s    Since currently 3 month post partum will have her return for weight check in March and to start zepbound in April. To start on initial dose of medication and then to titrate as tolerated.  They have tried more than 6 months of lifestyle modifications including diet and activity changes and has had insignificant weight loss of less than 1 lb a week. Patient denies personal and family history of MCT and MEN2 tumors. Patient denies personal history of pancreatitis. Side effects discussed but not limited to diarrhea, bloating, constipation, GI upset, heartburn, increased heart rate, headache, low blood sugar, fatigue and dizziness. Titration and medication administration discussed.  Medication agreement signed 2/12/25 contraception: nexplanon    Recommendations;  To start light breakfast or protein shake  To continue water intake  Recommend more meal planning and reduce dining out  Discussed doing at home exercises and then walking in the future

## 2025-03-02 DIAGNOSIS — F41.1 GAD (GENERALIZED ANXIETY DISORDER): ICD-10-CM

## 2025-03-02 DIAGNOSIS — F33.41 RECURRENT MAJOR DEPRESSIVE DISORDER, IN PARTIAL REMISSION (HCC): ICD-10-CM

## 2025-03-03 RX ORDER — ESCITALOPRAM OXALATE 20 MG/1
20 TABLET ORAL DAILY
Qty: 30 TABLET | Refills: 2 | Status: SHIPPED | OUTPATIENT
Start: 2025-03-03

## 2025-03-13 ENCOUNTER — APPOINTMENT (OUTPATIENT)
Dept: LAB | Facility: CLINIC | Age: 30
End: 2025-03-13
Payer: COMMERCIAL

## 2025-03-13 ENCOUNTER — OFFICE VISIT (OUTPATIENT)
Dept: FAMILY MEDICINE CLINIC | Facility: CLINIC | Age: 30
End: 2025-03-13
Payer: COMMERCIAL

## 2025-03-13 ENCOUNTER — PATIENT MESSAGE (OUTPATIENT)
Age: 30
End: 2025-03-13

## 2025-03-13 VITALS
BODY MASS INDEX: 38.1 KG/M2 | DIASTOLIC BLOOD PRESSURE: 108 MMHG | HEART RATE: 76 BPM | SYSTOLIC BLOOD PRESSURE: 146 MMHG | OXYGEN SATURATION: 96 % | WEIGHT: 189 LBS | HEIGHT: 59 IN

## 2025-03-13 DIAGNOSIS — Z13.220 LIPID SCREENING: ICD-10-CM

## 2025-03-13 DIAGNOSIS — E55.9 VITAMIN D DEFICIENCY: ICD-10-CM

## 2025-03-13 DIAGNOSIS — G43.009 MIGRAINE WITHOUT AURA AND WITHOUT STATUS MIGRAINOSUS, NOT INTRACTABLE: ICD-10-CM

## 2025-03-13 DIAGNOSIS — Z86.32 HISTORY OF GESTATIONAL DIABETES: ICD-10-CM

## 2025-03-13 DIAGNOSIS — R03.0 ELEVATED BLOOD PRESSURE READING: ICD-10-CM

## 2025-03-13 DIAGNOSIS — F41.9 ANXIETY: Primary | ICD-10-CM

## 2025-03-13 DIAGNOSIS — Z79.899 CONTROLLED SUBSTANCE AGREEMENT SIGNED: ICD-10-CM

## 2025-03-13 DIAGNOSIS — F41.9 ANXIETY: ICD-10-CM

## 2025-03-13 DIAGNOSIS — M54.50 CHRONIC MIDLINE LOW BACK PAIN WITHOUT SCIATICA: ICD-10-CM

## 2025-03-13 DIAGNOSIS — G89.29 CHRONIC MIDLINE LOW BACK PAIN WITHOUT SCIATICA: ICD-10-CM

## 2025-03-13 PROBLEM — O14.93 PRE-ECLAMPSIA IN THIRD TRIMESTER: Status: RESOLVED | Noted: 2024-10-31 | Resolved: 2025-03-13

## 2025-03-13 PROBLEM — E66.9 OBESITY: Status: ACTIVE | Noted: 2018-11-05

## 2025-03-13 PROBLEM — O14.90 PREECLAMPSIA: Status: RESOLVED | Noted: 2024-11-02 | Resolved: 2025-03-13

## 2025-03-13 PROBLEM — E66.9 OBESITY: Status: ACTIVE | Noted: 2024-10-31

## 2025-03-13 LAB
25(OH)D3 SERPL-MCNC: 10 NG/ML (ref 30–100)
ALBUMIN SERPL BCG-MCNC: 4.4 G/DL (ref 3.5–5)
ALP SERPL-CCNC: 77 U/L (ref 34–104)
ALT SERPL W P-5'-P-CCNC: 36 U/L (ref 7–52)
ANION GAP SERPL CALCULATED.3IONS-SCNC: 11 MMOL/L (ref 4–13)
AST SERPL W P-5'-P-CCNC: 27 U/L (ref 13–39)
BASOPHILS # BLD AUTO: 0.05 THOUSANDS/ÂΜL (ref 0–0.1)
BASOPHILS NFR BLD AUTO: 1 % (ref 0–1)
BILIRUB SERPL-MCNC: 0.55 MG/DL (ref 0.2–1)
BUN SERPL-MCNC: 14 MG/DL (ref 5–25)
CALCIUM SERPL-MCNC: 9.6 MG/DL (ref 8.4–10.2)
CHLORIDE SERPL-SCNC: 100 MMOL/L (ref 96–108)
CHOLEST SERPL-MCNC: 191 MG/DL (ref ?–200)
CO2 SERPL-SCNC: 25 MMOL/L (ref 21–32)
CREAT SERPL-MCNC: 0.61 MG/DL (ref 0.6–1.3)
EOSINOPHIL # BLD AUTO: 0.36 THOUSAND/ÂΜL (ref 0–0.61)
EOSINOPHIL NFR BLD AUTO: 6 % (ref 0–6)
ERYTHROCYTE [DISTWIDTH] IN BLOOD BY AUTOMATED COUNT: 16.7 % (ref 11.6–15.1)
GFR SERPL CREATININE-BSD FRML MDRD: 122 ML/MIN/1.73SQ M
GLUCOSE P FAST SERPL-MCNC: 108 MG/DL (ref 65–99)
HCT VFR BLD AUTO: 38.9 % (ref 34.8–46.1)
HDLC SERPL-MCNC: 60 MG/DL
HGB BLD-MCNC: 12.3 G/DL (ref 11.5–15.4)
IMM GRANULOCYTES # BLD AUTO: 0.02 THOUSAND/UL (ref 0–0.2)
IMM GRANULOCYTES NFR BLD AUTO: 0 % (ref 0–2)
LDLC SERPL CALC-MCNC: 99 MG/DL (ref 0–100)
LYMPHOCYTES # BLD AUTO: 1.88 THOUSANDS/ÂΜL (ref 0.6–4.47)
LYMPHOCYTES NFR BLD AUTO: 30 % (ref 14–44)
MCH RBC QN AUTO: 26.6 PG (ref 26.8–34.3)
MCHC RBC AUTO-ENTMCNC: 31.6 G/DL (ref 31.4–37.4)
MCV RBC AUTO: 84 FL (ref 82–98)
MONOCYTES # BLD AUTO: 0.41 THOUSAND/ÂΜL (ref 0.17–1.22)
MONOCYTES NFR BLD AUTO: 7 % (ref 4–12)
NEUTROPHILS # BLD AUTO: 3.53 THOUSANDS/ÂΜL (ref 1.85–7.62)
NEUTS SEG NFR BLD AUTO: 56 % (ref 43–75)
NRBC BLD AUTO-RTO: 0 /100 WBCS
PLATELET # BLD AUTO: 358 THOUSANDS/UL (ref 149–390)
PMV BLD AUTO: 9.4 FL (ref 8.9–12.7)
POTASSIUM SERPL-SCNC: 3.7 MMOL/L (ref 3.5–5.3)
PROT SERPL-MCNC: 7.4 G/DL (ref 6.4–8.4)
RBC # BLD AUTO: 4.63 MILLION/UL (ref 3.81–5.12)
SODIUM SERPL-SCNC: 136 MMOL/L (ref 135–147)
TRIGL SERPL-MCNC: 160 MG/DL (ref ?–150)
TSH SERPL DL<=0.05 MIU/L-ACNC: 3.22 UIU/ML (ref 0.45–4.5)
WBC # BLD AUTO: 6.25 THOUSAND/UL (ref 4.31–10.16)

## 2025-03-13 PROCEDURE — 85025 COMPLETE CBC W/AUTO DIFF WBC: CPT

## 2025-03-13 PROCEDURE — 36415 COLL VENOUS BLD VENIPUNCTURE: CPT

## 2025-03-13 PROCEDURE — 99214 OFFICE O/P EST MOD 30 MIN: CPT | Performed by: FAMILY MEDICINE

## 2025-03-13 PROCEDURE — 84443 ASSAY THYROID STIM HORMONE: CPT

## 2025-03-13 PROCEDURE — 82306 VITAMIN D 25 HYDROXY: CPT

## 2025-03-13 PROCEDURE — 80053 COMPREHEN METABOLIC PANEL: CPT

## 2025-03-13 PROCEDURE — 80061 LIPID PANEL: CPT

## 2025-03-13 RX ORDER — CYCLOBENZAPRINE HCL 10 MG
10 TABLET ORAL 3 TIMES DAILY PRN
Qty: 60 TABLET | Refills: 0 | Status: SHIPPED | OUTPATIENT
Start: 2025-03-13

## 2025-03-13 NOTE — PATIENT INSTRUCTIONS
1. Anxiety  Assessment & Plan:  With regard to anxiety, I agree that follow-up with psychiatry does make sense.  She has been on Lexapro 20 mg all long, was recommended to add 10 mg previously, but she had held off on that.  With the increase in her symptoms, quite reasonable for her to talk to psychiatry and consider that restart of 30 mg Lexapro.  Given the increase stressors at home, it is not surprising.  Recommend counseling as well.  Using alprazolam as needed.  Orders:    Ambulatory referral to Psych Services; Future    CBC and differential; Future    Comprehensive metabolic panel; Future    TSH, 3rd generation; Future    [facility] Prescribed Meds and Special Instructions    Amphetamines, Methamphetamines    Butalbital    Phenobarbital    Secobarbital    Alprazolam    Clonazepam    Diazepam, Temazepam, Oxazepam    Lorazepam    Gabapentin    Pregabalin    Cocaine    Heroin    Buprenorphine    Levorphanol    Meperidine    Naltrexone    Fentanyl    Methadone    Oxycodone    Tapentadol    THC    Tramadol    Codeine, Hydrocodone, Hydropmorphone, Morphine    Bath Salts    Ethyl Glucuronide/Ethyl Sulfate    Kratom    Spice    Methylphenidate    Phentermine    Validity Oxidant    Validity Creatinine    Validity pH    Validity Specific    Xylazine Definitive Test    Orders:  -     Ambulatory referral to Psych Services; Future  -     CBC and differential; Future  -     Comprehensive metabolic panel; Future; Expected date: 03/13/2025  -     TSH, 3rd generation; Future; Expected date: 03/13/2025  -     [facility] Prescribed Meds and Special Instructions  -     Amphetamines, Methamphetamines  -     Butalbital  -     Phenobarbital  -     Secobarbital  -     Alprazolam  -     Clonazepam  -     Diazepam, Temazepam, Oxazepam  -     Lorazepam  -     Gabapentin  -     Pregabalin  -     Cocaine  -     Heroin  -     Buprenorphine  -     Levorphanol  -     Meperidine  -     Naltrexone  -     Fentanyl  -     Methadone  -      Oxycodone  -     Tapentadol  -     THC  -     Tramadol  -     Codeine, Hydrocodone, Hydropmorphone, Morphine  -     Bath Salts  -     Ethyl Glucuronide/Ethyl Sulfate  -     Kratom  -     Spice  -     Methylphenidate  -     Phentermine  -     Validity Oxidant  -     Validity Creatinine  -     Validity pH  -     Validity Specific  -     Xylazine Definitive Test  2. Vitamin D deficiency  Assessment & Plan:  Patient does have vitamin D deficiency listed.  Will check labs.  Orders:    Vitamin D 25 hydroxy; Future    Orders:  -     Vitamin D 25 hydroxy; Future; Expected date: 09/13/2025  3. History of gestational diabetes  Assessment & Plan:  Stational diabetes.  Her last A1c was 5.9, that was while she was pregnant.  Will continue to follow.  There is an increased chance of actual diabetes after gestational diabetes.  Lab Results   Component Value Date    HGBA1C 5.9 (H) 10/31/2024     Orders:    Comprehensive metabolic panel; Future    Orders:  -     Comprehensive metabolic panel; Future; Expected date: 03/13/2025  4. Migraine without aura and without status migrainosus, not intractable  Assessment & Plan:  Been having monthly injections previously with neurology.  Agree.  Refer back to neurology.  Orders:    CBC and differential; Future    Comprehensive metabolic panel; Future    TSH, 3rd generation; Future    Ambulatory Referral to Neurology; Future    Orders:  -     CBC and differential; Future  -     Comprehensive metabolic panel; Future; Expected date: 03/13/2025  -     TSH, 3rd generation; Future; Expected date: 03/13/2025  -     Ambulatory Referral to Neurology; Future  5. Lipid screening  -     Lipid Panel with Direct LDL reflex; Future; Expected date: 03/13/2025  6. Chronic midline low back pain without sciatica  Assessment & Plan:    Orders:    cyclobenzaprine (FLEXERIL) 10 mg tablet; Take 1 tablet (10 mg total) by mouth 3 (three) times a day as needed for muscle spasms    Orders:  -     cyclobenzaprine  (FLEXERIL) 10 mg tablet; Take 1 tablet (10 mg total) by mouth 3 (three) times a day as needed for muscle spasms  7. Controlled substance agreement signed  Assessment & Plan:  Reviewed controlled substance agreement for Xanax.  Patient is following with Clarisse Magallanes for this.  Based on office visit today and after discussing with Clarisse Magallanes, we agreed to check UDS, and sign off on agreement for her.       8. Elevated blood pressure reading  Assessment & Plan:  Blood pressure does seem to be quite variable.  At this point, I would not recommend starting medications as she does have a low blood pressure as well as lightheadedness and dizziness when she came in with a lower number.  After that, the blood pressure was back up.  Encourage exercise, improved sleep and control of anxiety (she is working on this with psychiatry), and follow-up in the future.  Check laboratory studies.  Can consider ECG at next visit.           COVID 19 Instructions    Mone Wilhelm was advised to limit contact with others to essential tasks such as getting food, medications, and medical care.    Proper handwashing reviewed, and Hand sanitzer when washing is not available.    If the patient develops symptoms of COVID 19, the patient should call the office as soon as possible.    It is strongly recommended that Flu Vaccinations be obtained.      Virtual Visits:  Maciej: This works on smart phones (any phone with Internet browsing capability).  You should get a text message when the provider is ready to see you.  Click on the link in the text message, and the call should start.  You will need to type in your name, and allow camera and microphone access.  This is HIPPA compliant, and secure.      If you have not already done so, get immunized to COVID 19.      We are committed to getting you vaccinated as soon as possible and will be closely following CDC and [place] JAYY guidelines as they are released and revised.  Please  refer to our COVID-19 vaccine webpage for the most up to date information on the vaccine and our distribution efforts.    This site will also have the most up to date recommendations for COVID booster vaccine.    [email or web address]-19/[email or web address]-vaccine    Call 5-118-IUWNDCK ([phone]), option 7    You can also visit https://www.vaccines.gov/ to find vaccines in your area.    OUR LOCATION:    [facility]  [address] [place] [address], [address]  [place], [place], [address] [phone]  Fax: [phone]    Lab services, Rheumatology, and OB/GYN are at this location as well.

## 2025-03-13 NOTE — ASSESSMENT & PLAN NOTE
Stational diabetes.  Her last A1c was 5.9, that was while she was pregnant.  Will continue to follow.  There is an increased chance of actual diabetes after gestational diabetes.  Lab Results   Component Value Date    HGBA1C 5.9 (H) 10/31/2024     Orders:  •  Comprehensive metabolic panel; Future

## 2025-03-13 NOTE — ASSESSMENT & PLAN NOTE
Reviewed controlled substance agreement for Xanax.  Patient is following with Clarisse Magallanes for this.  Based on office visit today and after discussing with Clarisse Magallanes, we agreed to check UDS, and sign off on agreement for her.

## 2025-03-13 NOTE — ASSESSMENT & PLAN NOTE
Patient does have vitamin D deficiency listed.  Will check labs.  Orders:  •  Vitamin D 25 hydroxy; Future

## 2025-03-13 NOTE — PROGRESS NOTES
Name: Mone Wilhelm      : 1995      MRN: [id number]  Encounter Provider: Alon Messer MD  Encounter Date: 3/13/2025   Encounter department: [facility]  :  Assessment & Plan  Anxiety  With regard to anxiety, I agree that follow-up with psychiatry does make sense.  She has been on Lexapro 20 mg all long, was recommended to add 10 mg previously, but she had held off on that.  With the increase in her symptoms, quite reasonable for her to talk to psychiatry and consider that restart of 30 mg Lexapro.  Given the increase stressors at home, it is not surprising.  Recommend counseling as well.  Using alprazolam as needed.  Orders:  •  Ambulatory referral to Psych Services; Future  •  CBC and differential; Future  •  Comprehensive metabolic panel; Future  •  TSH, 3rd generation; Future  •  [facility] Prescribed Meds and Special Instructions  •  Amphetamines, Methamphetamines  •  Butalbital  •  Phenobarbital  •  Secobarbital  •  Alprazolam  •  Clonazepam  •  Diazepam, Temazepam, Oxazepam  •  Lorazepam  •  Gabapentin  •  Pregabalin  •  Cocaine  •  Heroin  •  Buprenorphine  •  Levorphanol  •  Meperidine  •  Naltrexone  •  Fentanyl  •  Methadone  •  Oxycodone  •  Tapentadol  •  THC  •  Tramadol  •  Codeine, Hydrocodone, Hydropmorphone, Morphine  •  Bath Salts  •  Ethyl Glucuronide/Ethyl Sulfate  •  Kratom  •  Spice  •  Methylphenidate  •  Phentermine  •  Validity Oxidant  •  Validity Creatinine  •  Validity pH  •  Validity Specific  •  Xylazine Definitive Test    Vitamin D deficiency  Patient does have vitamin D deficiency listed.  Will check labs.  Orders:  •  Vitamin D 25 hydroxy; Future    History of gestational diabetes  Stational diabetes.  Her last A1c was 5.9, that was while she was pregnant.  Will continue to follow.  There is an increased chance of actual diabetes after gestational diabetes.  Lab Results   Component Value Date    HGBA1C 5.9 (H) 10/31/2024      Orders:  •  Comprehensive metabolic panel; Future    Migraine without aura and without status migrainosus, not intractable  Been having monthly injections previously with neurology.  Agree.  Refer back to neurology.  Orders:  •  CBC and differential; Future  •  Comprehensive metabolic panel; Future  •  TSH, 3rd generation; Future  •  Ambulatory Referral to Neurology; Future    Lipid screening    Orders:  •  Lipid Panel with Direct LDL reflex; Future    Chronic midline low back pain without sciatica    Orders:  •  cyclobenzaprine (FLEXERIL) 10 mg tablet; Take 1 tablet (10 mg total) by mouth 3 (three) times a day as needed for muscle spasms    Controlled substance agreement signed  Reviewed controlled substance agreement for Xanax.  Patient is following with Clarisse Magallanes for this.  Based on office visit today and after discussing with Clarisse Magallanes, we agreed to check UDS, and sign off on agreement for her.       Elevated blood pressure reading  Blood pressure does seem to be quite variable.  At this point, I would not recommend starting medications as she does have a low blood pressure as well as lightheadedness and dizziness when she came in with a lower number.  After that, the blood pressure was back up.  Encourage exercise, improved sleep and control of anxiety (she is working on this with psychiatry), and follow-up in the future.  Check laboratory studies.  Can consider ECG at next visit.             1. Anxiety  Assessment & Plan:  With regard to anxiety, I agree that follow-up with psychiatry does make sense.  She has been on Lexapro 20 mg all long, was recommended to add 10 mg previously, but she had held off on that.  With the increase in her symptoms, quite reasonable for her to talk to psychiatry and consider that restart of 30 mg Lexapro.  Given the increase stressors at home, it is not surprising.  Recommend counseling as well.  Using alprazolam as needed.  Orders:  •  Ambulatory referral to Psych  Services; Future  •  CBC and differential; Future  •  Comprehensive metabolic panel; Future  •  TSH, 3rd generation; Future  •  [facility] Prescribed Meds and Special Instructions  •  Amphetamines, Methamphetamines  •  Butalbital  •  Phenobarbital  •  Secobarbital  •  Alprazolam  •  Clonazepam  •  Diazepam, Temazepam, Oxazepam  •  Lorazepam  •  Gabapentin  •  Pregabalin  •  Cocaine  •  Heroin  •  Buprenorphine  •  Levorphanol  •  Meperidine  •  Naltrexone  •  Fentanyl  •  Methadone  •  Oxycodone  •  Tapentadol  •  THC  •  Tramadol  •  Codeine, Hydrocodone, Hydropmorphone, Morphine  •  Bath Salts  •  Ethyl Glucuronide/Ethyl Sulfate  •  Kratom  •  Spice  •  Methylphenidate  •  Phentermine  •  Validity Oxidant  •  Validity Creatinine  •  Validity pH  •  Validity Specific  •  Xylazine Definitive Test    Orders:  -     Ambulatory referral to Psych Services; Future  -     CBC and differential; Future  -     Comprehensive metabolic panel; Future; Expected date: 03/13/2025  -     TSH, 3rd generation; Future; Expected date: 03/13/2025  -     [facility] Prescribed Meds and Special Instructions  -     Amphetamines, Methamphetamines  -     Butalbital  -     Phenobarbital  -     Secobarbital  -     Alprazolam  -     Clonazepam  -     Diazepam, Temazepam, Oxazepam  -     Lorazepam  -     Gabapentin  -     Pregabalin  -     Cocaine  -     Heroin  -     Buprenorphine  -     Levorphanol  -     Meperidine  -     Naltrexone  -     Fentanyl  -     Methadone  -     Oxycodone  -     Tapentadol  -     THC  -     Tramadol  -     Codeine, Hydrocodone, Hydropmorphone, Morphine  -     Bath Salts  -     Ethyl Glucuronide/Ethyl Sulfate  -     Kratom  -     Spice  -     Methylphenidate  -     Phentermine  -     Validity Oxidant  -     Validity Creatinine  -     Validity pH  -     Validity Specific  -     Xylazine Definitive Test  2. Vitamin D deficiency  Assessment & Plan:  Patient does have vitamin D deficiency listed.  Will check  labs.  Orders:  •  Vitamin D 25 hydroxy; Future    Orders:  -     Vitamin D 25 hydroxy; Future; Expected date: 09/13/2025  3. History of gestational diabetes  Assessment & Plan:  Stational diabetes.  Her last A1c was 5.9, that was while she was pregnant.  Will continue to follow.  There is an increased chance of actual diabetes after gestational diabetes.  Lab Results   Component Value Date    HGBA1C 5.9 (H) 10/31/2024     Orders:  •  Comprehensive metabolic panel; Future    Orders:  -     Comprehensive metabolic panel; Future; Expected date: 03/13/2025  4. Migraine without aura and without status migrainosus, not intractable  Assessment & Plan:  Been having monthly injections previously with neurology.  Agree.  Refer back to neurology.  Orders:  •  CBC and differential; Future  •  Comprehensive metabolic panel; Future  •  TSH, 3rd generation; Future  •  Ambulatory Referral to Neurology; Future    Orders:  -     CBC and differential; Future  -     Comprehensive metabolic panel; Future; Expected date: 03/13/2025  -     TSH, 3rd generation; Future; Expected date: 03/13/2025  -     Ambulatory Referral to Neurology; Future  5. Lipid screening  -     Lipid Panel with Direct LDL reflex; Future; Expected date: 03/13/2025  6. Chronic midline low back pain without sciatica  Assessment & Plan:    Orders:  •  cyclobenzaprine (FLEXERIL) 10 mg tablet; Take 1 tablet (10 mg total) by mouth 3 (three) times a day as needed for muscle spasms    Orders:  -     cyclobenzaprine (FLEXERIL) 10 mg tablet; Take 1 tablet (10 mg total) by mouth 3 (three) times a day as needed for muscle spasms  7. Controlled substance agreement signed  Assessment & Plan:  Reviewed controlled substance agreement for Xanax.  Patient is following with Clarisse Magallanes for this.  Based on office visit today and after discussing with Clarisse Magallanes, we agreed to check UDS, and sign off on agreement for her.       8. Elevated blood pressure reading  Assessment &  Plan:  Blood pressure does seem to be quite variable.  At this point, I would not recommend starting medications as she does have a low blood pressure as well as lightheadedness and dizziness when she came in with a lower number.  After that, the blood pressure was back up.  Encourage exercise, improved sleep and control of anxiety (she is working on this with psychiatry), and follow-up in the future.  Check laboratory studies.  Can consider ECG at next visit.           Chief Complaint   Patient presents with   • Follow-up     Follow up hypertension and anxiety            History of Present Illness   Patient is here today to follow-up on several issues.    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  She is checked several times throughout the last several weeks, noticing higher numbers including up to 160/100.  Reviewed today's blood pressure.  Last several days she has noted some lightheadedness, dizziness.    Significant increase in anxiety lately.  She is not sure what it might be related to.  Did give birth about 4 to 5 months ago.  Trying to deal with a new baby, as well as working.  Her other children are 15 months, and 12 years.    Patient wondered about going back to psychiatry.  She has been there before.  She would like to go again.  Has been there recently, and will be rescheduling her appointment.    Headaches: Has had migraine headaches.  Had been getting injections previously.  With her recent pregnancy, that was not able to be continued.  She wondered about going back to neurology for that.          Review of Systems   Constitutional:  Positive for activity change and fatigue.   HENT: Negative.     Respiratory: Negative.     Cardiovascular: Negative.    Gastrointestinal: Negative.         Since , has noted more frequent bowel movements.   Genitourinary: Negative.    Musculoskeletal: Negative.    Skin: Negative.    Neurological:  Positive for  "light-headedness and headaches.   Psychiatric/Behavioral:  Positive for dysphoric mood and sleep disturbance. Negative for behavioral problems, confusion, decreased concentration, hallucinations, self-injury and suicidal ideas. The patient is nervous/anxious.        Objective   BP (!) 146/108 (BP Location: Right arm, Patient Position: Standing, Cuff Size: Standard)   Pulse 76   Ht 4' 11\" (1.499 m)   Wt 85.7 kg (189 lb)   SpO2 96%   Breastfeeding No   BMI 38.17 kg/m²      Physical Exam  Vitals and nursing note reviewed.   Constitutional:       Appearance: Normal appearance. She is well-developed.   HENT:      Head: Normocephalic and atraumatic.   Cardiovascular:      Rate and Rhythm: Normal rate and regular rhythm.      Pulses:           Carotid pulses are 2+ on the right side and 2+ on the left side.     Heart sounds: Normal heart sounds.   Pulmonary:      Effort: Pulmonary effort is normal.      Breath sounds: Normal breath sounds. No wheezing or rales.   Chest:      Chest wall: No tenderness.   Abdominal:      Comments: No bruits, no tenderness.   Neurological:      Mental Status: She is alert.         "

## 2025-03-13 NOTE — ASSESSMENT & PLAN NOTE
Orders:  •  cyclobenzaprine (FLEXERIL) 10 mg tablet; Take 1 tablet (10 mg total) by mouth 3 (three) times a day as needed for muscle spasms

## 2025-03-13 NOTE — ASSESSMENT & PLAN NOTE
Blood pressure does seem to be quite variable.  At this point, I would not recommend starting medications as she does have a low blood pressure as well as lightheadedness and dizziness when she came in with a lower number.  After that, the blood pressure was back up.  Encourage exercise, improved sleep and control of anxiety (she is working on this with psychiatry), and follow-up in the future.  Check laboratory studies.  Can consider ECG at next visit.

## 2025-03-13 NOTE — ASSESSMENT & PLAN NOTE
Been having monthly injections previously with neurology.  Agree.  Refer back to neurology.  Orders:  •  CBC and differential; Future  •  Comprehensive metabolic panel; Future  •  TSH, 3rd generation; Future  •  Ambulatory Referral to Neurology; Future

## 2025-03-13 NOTE — ASSESSMENT & PLAN NOTE
With regard to anxiety, I agree that follow-up with psychiatry does make sense.  She has been on Lexapro 20 mg all long, was recommended to add 10 mg previously, but she had held off on that.  With the increase in her symptoms, quite reasonable for her to talk to psychiatry and consider that restart of 30 mg Lexapro.  Given the increase stressors at home, it is not surprising.  Recommend counseling as well.  Using alprazolam as needed.  Orders:  •  Ambulatory referral to Psych Services; Future  •  CBC and differential; Future  •  Comprehensive metabolic panel; Future  •  TSH, 3rd generation; Future  •  [facility] Prescribed Meds and Special Instructions  •  Amphetamines, Methamphetamines  •  Butalbital  •  Phenobarbital  •  Secobarbital  •  Alprazolam  •  Clonazepam  •  Diazepam, Temazepam, Oxazepam  •  Lorazepam  •  Gabapentin  •  Pregabalin  •  Cocaine  •  Heroin  •  Buprenorphine  •  Levorphanol  •  Meperidine  •  Naltrexone  •  Fentanyl  •  Methadone  •  Oxycodone  •  Tapentadol  •  THC  •  Tramadol  •  Codeine, Hydrocodone, Hydropmorphone, Morphine  •  Bath Salts  •  Ethyl Glucuronide/Ethyl Sulfate  •  Kratom  •  Spice  •  Methylphenidate  •  Phentermine  •  Validity Oxidant  •  Validity Creatinine  •  Validity pH  •  Validity Specific  •  Xylazine Definitive Test

## 2025-03-18 LAB
4OH-XYLAZINE UR QL CFM: NEGATIVE NG/ML
6MAM UR QL CFM: NEGATIVE NG/ML
7AMINOCLONAZEPAM UR QL CFM: NEGATIVE NG/ML
A-OH ALPRAZ UR QL CFM: ABNORMAL NG/ML
ACCEPTABLE CREAT UR QL: NORMAL MG/DL
ACCEPTIBLE SP GR UR QL: NORMAL
AMPHET UR QL CFM: NEGATIVE NG/ML
BUPRENORPHINE UR QL CFM: NEGATIVE NG/ML
BUTALBITAL UR QL CFM: NEGATIVE NG/ML
BZE UR QL CFM: ABNORMAL NG/ML
CODEINE UR QL CFM: NEGATIVE NG/ML
EDDP UR QL CFM: NEGATIVE NG/ML
ETHYL GLUCURONIDE UR QL CFM: ABNORMAL NG/ML
ETHYL SULFATE UR QL SCN: ABNORMAL NG/ML
EUTYLONE UR QL: NEGATIVE NG/ML
FENTANYL UR QL CFM: NEGATIVE NG/ML
GLIADIN IGG SER IA-ACNC: NEGATIVE NG/ML
HYDROCODONE UR QL CFM: NEGATIVE NG/ML
HYDROMORPHONE UR QL CFM: NEGATIVE NG/ML
LORAZEPAM UR QL CFM: NEGATIVE NG/ML
ME-PHENIDATE UR QL CFM: NEGATIVE NG/ML
MEPERIDINE UR QL CFM: NEGATIVE NG/ML
METHADONE UR QL CFM: NEGATIVE NG/ML
METHAMPHET UR QL CFM: NEGATIVE NG/ML
MORPHINE UR QL CFM: NEGATIVE NG/ML
NALTREXONE UR QL CFM: NEGATIVE NG/ML
NITRITE UR QL: NORMAL UG/ML
NORBUPRENORPHINE UR QL CFM: NEGATIVE NG/ML
NORDIAZEPAM UR QL CFM: NEGATIVE NG/ML
NORFENTANYL UR QL CFM: NEGATIVE NG/ML
NORHYDROCODONE UR QL CFM: NEGATIVE NG/ML
NORMEPERIDINE UR QL CFM: NEGATIVE NG/ML
NOROXYCODONE UR QL CFM: NEGATIVE NG/ML
OXAZEPAM UR QL CFM: NEGATIVE NG/ML
OXYCODONE UR QL CFM: NEGATIVE NG/ML
OXYMORPHONE UR QL CFM: NEGATIVE NG/ML
PARA-FLUOROFENTANYL QUANTIFICATION: NORMAL NG/ML
PHENOBARB UR QL CFM: NEGATIVE NG/ML
RESULT ALL_PRESCRIBED MEDS AND SPECIAL INSTRUCTIONS: NORMAL
SECOBARBITAL UR QL CFM: NEGATIVE NG/ML
SL AMB 5F-ADB-M7 METABOLITE QUANTIFICATION: NEGATIVE NG/ML
SL AMB 7-OH-MITRAGYNINE (KRATOM ALKALOID) QUANTIFICATION: NEGATIVE NG/ML
SL AMB AB-FUBINACA-M3 METABOLITE QUANTIFICATION: NEGATIVE NG/ML
SL AMB ACETYL FENTANYL QUANTIFICATION: NORMAL NG/ML
SL AMB ACETYL NORFENTANYL QUANTIFICATION: NORMAL NG/ML
SL AMB ACRYL FENTANYL QUANTIFICATION: NORMAL NG/ML
SL AMB CARFENTANIL QUANTIFICATION: NORMAL NG/ML
SL AMB CTHC (MARIJUANA METABOLITE) QUANTIFICATION: NEGATIVE NG/ML
SL AMB DEXTRORPHAN (DEXTROMETHORPHAN METABOLITE) QUANT: NEGATIVE NG/ML
SL AMB GABAPENTIN QUANTIFICATION: NEGATIVE NG/ML
SL AMB JWH018 METABOLITE QUANTIFICATION: NEGATIVE NG/ML
SL AMB JWH073 METABOLITE QUANTIFICATION: NEGATIVE NG/ML
SL AMB MDMB-FUBINACA-M1 METABOLITE QUANTIFICATION: NEGATIVE NG/ML
SL AMB METHYLONE QUANTIFICATION: NEGATIVE NG/ML
SL AMB N-DESMETHYL-TRAMADOL QUANTIFICATION: NEGATIVE NG/ML
SL AMB PHENTERMINE QUANTIFICATION: NEGATIVE NG/ML
SL AMB PREGABALIN QUANTIFICATION: NEGATIVE NG/ML
SL AMB RCS4 METABOLITE QUANTIFICATION: NEGATIVE NG/ML
SL AMB RITALINIC ACID QUANTIFICATION: NEGATIVE NG/ML
SMOOTH MUSCLE AB TITR SER IF: NEGATIVE NG/ML
SPECIMEN DRAWN SERPL: NEGATIVE NG/ML
SPECIMEN PH ACCEPTABLE UR: NORMAL
TAPENTADOL UR QL CFM: NEGATIVE NG/ML
TEMAZEPAM UR QL CFM: NEGATIVE NG/ML
TRAMADOL UR QL CFM: NEGATIVE NG/ML
URATE/CREAT 24H UR: NEGATIVE NG/ML
XYLAZINE UR QL CFM: NEGATIVE NG/ML

## 2025-03-19 ENCOUNTER — RESULTS FOLLOW-UP (OUTPATIENT)
Dept: FAMILY MEDICINE CLINIC | Facility: CLINIC | Age: 30
End: 2025-03-19

## 2025-03-20 NOTE — TELEPHONE ENCOUNTER
----- Message from lAon Messer MD sent at 3/19/2025  9:14 PM EDT -----  UDS was inconsistent with use of prescribed medication.  Please schedule office visit to discuss.  Please see the patient message for MyChart for discussion/instructions if patient has MyChart account.

## 2025-03-24 NOTE — TELEPHONE ENCOUNTER
Mone Wilhelm to [facility] (supporting Alon Messer MD)         3/19/25  9:28 PM  My apologies I mean to write my last dose was taken on 3/7/25. My urine was collected on 3/13/25 at your office.   Mone Wilhelm to [facility] (supporting Alon Messer MD)         3/19/25  9:26 PM  I seen you at the office on 3/13/25.   Mone Wilhelm to [facility] (supporting Alon Messer MD)         3/19/25  9:25 PM  Of course it’s going to be inconsistent. I picked up the medication last on 2/4/25, it’s a 30 day fill. My last dose take was on 2/7/25, with the urine analysis going back 2-3 days it’s not going to come up. Is my pdmp being checked?

## 2025-03-24 NOTE — TELEPHONE ENCOUNTER
----- Message from Alon Messer MD sent at 3/19/2025  9:14 PM EDT -----  UDS was inconsistent with use of prescribed medication.  Please schedule office visit to discuss.  Please see the patient message for MyChart for discussion/instructions if patient has MyChart account.

## 2025-04-14 ENCOUNTER — OFFICE VISIT (OUTPATIENT)
Dept: FAMILY MEDICINE CLINIC | Facility: CLINIC | Age: 30
End: 2025-04-14
Payer: COMMERCIAL

## 2025-04-14 VITALS
RESPIRATION RATE: 18 BRPM | SYSTOLIC BLOOD PRESSURE: 142 MMHG | BODY MASS INDEX: 38.71 KG/M2 | OXYGEN SATURATION: 98 % | TEMPERATURE: 98.4 F | HEART RATE: 80 BPM | DIASTOLIC BLOOD PRESSURE: 90 MMHG | WEIGHT: 192 LBS | HEIGHT: 59 IN

## 2025-04-14 DIAGNOSIS — J02.9 SORE THROAT: Primary | ICD-10-CM

## 2025-04-14 DIAGNOSIS — F41.9 ANXIETY: ICD-10-CM

## 2025-04-14 DIAGNOSIS — R09.82 POST-NASAL DRIP: ICD-10-CM

## 2025-04-14 PROCEDURE — 99214 OFFICE O/P EST MOD 30 MIN: CPT | Performed by: NURSE PRACTITIONER

## 2025-04-14 PROCEDURE — 87880 STREP A ASSAY W/OPTIC: CPT | Performed by: NURSE PRACTITIONER

## 2025-04-14 RX ORDER — CETIRIZINE HYDROCHLORIDE 10 MG/1
10 TABLET ORAL DAILY
Qty: 30 TABLET | Refills: 5 | Status: SHIPPED | OUTPATIENT
Start: 2025-04-14

## 2025-04-14 RX ORDER — DIPHENHYDRAMINE HCL 50 MG
50 CAPSULE ORAL EVERY 6 HOURS PRN
Qty: 30 CAPSULE | Refills: 0 | Status: SHIPPED | OUTPATIENT
Start: 2025-04-14

## 2025-04-14 RX ORDER — ACETAMINOPHEN 325 MG/1
650 TABLET ORAL EVERY 6 HOURS SCHEDULED
Qty: 60 TABLET | Refills: 0 | Status: SHIPPED | OUTPATIENT
Start: 2025-04-14

## 2025-04-14 NOTE — BH CRISIS PLAN
Client Name: Mone Wilhelm       Client YOB: 1995    [facility] Safety Plan      Creation Date: 2/16/24 Update Date: 2/16/24   Created By: Malena Samuel MD Last Updated By: Malena Samuel MD      Step 1: Warning Signs:   Warning Signs   high anxiety levels, anger            Step 2: Internal Coping Strategies:   Internal Coping Strategies   walk, drive, watch TV            Step 3: People and social settings that provide distraction:   Name Contact Information   Suzanne Hester (mother) [phone]    Places   Mother's place           Step 4: People whom I can ask for help during a crisis:      Name Contact Information    Dacia Wilhelm (sister) [phone]      Step 5: Professionals or agencies I can contact during a crisis:      Clinican/Agency Name Phone Emergency Contact    [facility] [phone]     Calrisse Magallanes NP        Local Emergency Department Emergency Department Phone Emergency Department Address    [facility] [phone] [address] [place] [address]        Crisis Phone Numbers:   Suicide Prevention Lifeline: Call or Text  643 Crisis Text Line: Text HOME to 794-653   Please note: Some [facility] do not have a separate number for Child/Adolescent specific crisis. If your county is not listed under Child/Adolescent, please call the adult number for your county      Adult Crisis Numbers: Child/Adolescent Crisis Numbers   [facility]: [phone] [facility]: [phone]   [facility]: [phone] [facility]: [phone]   [facility]: [phone] [place]: [phone]   [facility]: [phone] [place] [facility]: [phone]   [place] [facility]: [phone]   [facility]: [phone]   [facility]: [phone]   [facility]: [phone] (daytime) [phone] (after hours, weekends, holidays)      Step 6: Making the environment safer (plan for lethal means  safety):   Plan: Secure firearms, ask significant other     Optional: What is most important to me and worth living for?   Children, family     [place] Safety Plan. Nikole Gallegos and Gurinder Tubbs. Used with permission of the authors.

## 2025-04-14 NOTE — PATIENT INSTRUCTIONS
[facility]   [address], [place] [address]   [phone] [facility]   [address]   [place] [address]  [phone] [facility]   [address]  [place] [address]   [phone]     [facility]  [address]  [place] [address]  [phone] [facility]  [address], [place] [address]  [phone]    [facility]  [address] [facility]   [place] [address]  [phone] [facility]  [address]  [address]  [place] [address]   [phone]    [facility]   [address]  [place] [address]  [phone] [facility]  [address] [place] [address]  [place] [address]     [facility]   [phone]  [address] [place] [address]    [address]  [place] [address]      [facility]   [address]   [address]  [place] [address]  [phone]       [facility]  [address] [place] [address]   [address]  [place] [address]  [phone]     [facility]   [address] [address] [facility]  [address]  [place] [address]  [phone]       [facility]  [address]  [place] [address]  [phone]       [facility]  [address] [facility]  [place] [address]  [phone]     [facility]   [address]  [place] [address]   [phone]     [facility]   [address] [facility] [place] [address]  [phone]     [facility]  [address] [place] [place] [address]  [phone]     [facility]  [address] [facility] [address]  [place] [address]  [phone]

## 2025-04-14 NOTE — PSYCH
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 11/02/2024 135     Potassium 11/02/2024 4.1     Chloride 11/02/2024 104     CO2 11/02/2024 26     ANION GAP 11/02/2024 5     BUN 11/02/2024 11     Creatinine 11/02/2024 0.62     Glucose 11/02/2024 109     Calcium 11/02/2024 8.9     Corrected Calcium 11/02/2024 9.6     AST 11/02/2024 16     ALT 11/02/2024 7     Alkaline Phosphatase 11/02/2024 101     Total Protein 11/02/2024 6.2 (L)     Albumin 11/02/2024 3.1 (L)     Total Bilirubin 11/02/2024 0.21     eGFR 11/02/2024 123      I have personally reviewed all pertinent laboratory/tests results.    Appointment on 03/13/2025   Component Date Value Ref Range Status    WBC 03/13/2025 6.25  4.31 - 10.16 Thousand/uL Final    RBC 03/13/2025 4.63  3.81 - 5.12 Million/uL Final    Hemoglobin 03/13/2025 12.3  11.5 - 15.4 g/dL Final    Hematocrit 03/13/2025 38.9  34.8 - 46.1 % Final    MCV 03/13/2025 84   82 - 98 fL Final    MCH 03/13/2025 26.6 (L)  26.8 - 34.3 pg Final    MCHC 03/13/2025 31.6  31.4 - 37.4 g/dL Final    RDW 03/13/2025 16.7 (H)  11.6 - 15.1 % Final    MPV 03/13/2025 9.4  8.9 - 12.7 fL Final    Platelets 03/13/2025 358  149 - 390 Thousands/uL Final    nRBC 03/13/2025 0  /100 WBCs Final    Segmented % 03/13/2025 56  43 - 75 % Final    Immature Grans % 03/13/2025 0  0 - 2 % Final    Lymphocytes % 03/13/2025 30  14 - 44 % Final    Monocytes % 03/13/2025 7  4 - 12 % Final    Eosinophils Relative 03/13/2025 6  0 - 6 % Final    Basophils Relative 03/13/2025 1  0 - 1 % Final    Absolute Neutrophils 03/13/2025 3.53  1.85 - 7.62 Thousands/µL Final    Absolute Immature Grans 03/13/2025 0.02  0.00 - 0.20 Thousand/uL Final    Absolute Lymphocytes 03/13/2025 1.88  0.60 - 4.47 Thousands/µL Final    Absolute Monocytes 03/13/2025 0.41  0.17 - 1.22 Thousand/µL Final    Eosinophils Absolute 03/13/2025 0.36  0.00 - 0.61 Thousand/µL Final    Basophils Absolute 03/13/2025 0.05  0.00 - 0.10 Thousands/µL Final    Sodium 03/13/2025 136  135 - 147 mmol/L Final    Potassium 03/13/2025 3.7  3.5 - 5.3 mmol/L Final    Chloride 03/13/2025 100  96 - 108 mmol/L Final    CO2 03/13/2025 25  21 - 32 mmol/L Final    ANION GAP 03/13/2025 11  4 - 13 mmol/L Final    BUN 03/13/2025 14  5 - 25 mg/dL Final    Creatinine 03/13/2025 0.61  0.60 - 1.30 mg/dL Final    Standardized to IDMS reference method    Glucose, Fasting 03/13/2025 108 (H)  65 - 99 mg/dL Final    Calcium 03/13/2025 9.6  8.4 - 10.2 mg/dL Final    AST 03/13/2025 27  13 - 39 U/L Final    ALT 03/13/2025 36  7 - 52 U/L Final    Specimen collection should occur prior to Sulfasalazine administration due to the potential for falsely depressed results.     Alkaline Phosphatase 03/13/2025 77  34 - 104 U/L Final    Total Protein 03/13/2025 7.4  6.4 - 8.4 g/dL Final    Albumin 03/13/2025 4.4  3.5 - 5.0 g/dL Final    Total Bilirubin 03/13/2025 0.55  0.20 - 1.00 mg/dL Final    Use of this  assay is not recommended for patients undergoing treatment with eltrombopag due to the potential for falsely elevated results.  N-acetyl-p-benzoquinone imine (metabolite of Acetaminophen) will generate erroneously low results in samples for patients that have taken an overdose of Acetaminophen.    eGFR 03/13/2025 122  ml/min/1.73sq m Final    Cholesterol 03/13/2025 191  See Comment mg/dL Final    Cholesterol:         Pediatric <18 Years        Desirable          <170 mg/dL      Borderline High    170-199 mg/dL      High               >=200 mg/dL        Adult >=18 Years            Desirable         <200 mg/dL      Borderline High   200-239 mg/dL      High              >239 mg/dL      Triglycerides 03/13/2025 160 (H)  See Comment mg/dL Final    Triglyceride:     0-9Y            <75mg/dL     10Y-17Y         <90 mg/dL       >=18Y     Normal          <150 mg/dL     Borderline High 150-199 mg/dL     High            200-499 mg/dL        Very High       >499 mg/dL    Specimen collection should occur prior to Metamizole administration due to the potential for falsely depressed results.    HDL, Direct 03/13/2025 60  >=50 mg/dL Final    LDL Calculated 03/13/2025 99  0 - 100 mg/dL Final    LDL Cholesterol:     Optimal           <100 mg/dl     Near Optimal      100-129 mg/dl     Above Optimal       Borderline High 130-159 mg/dl       High            160-189 mg/dl       Very High       >189 mg/dl         This screening LDL is a calculated result.   It does not have the accuracy of the Direct Measured LDL in the monitoring of patients with hyperlipidemia and/or statin therapy.   Direct Measure LDL (GTF652) must be ordered separately in these patients.    TSH 3RD [facility] 03/13/2025 3.216  0.450 - 4.500 uIU/mL Final    The recommended reference ranges for TSH during pregnancy are as follows:   First trimester 0.100 to 2.500 uIU/mL   Second trimester  0.200 to 3.000 uIU/mL   Third trimester 0.300 to 3.000 uIU/m    Note: Normal  ranges may not apply to patients who are transgender, non-binary, or whose legal sex, sex at birth, and gender identity differ.  Adult TSH (3rd generation) reference range follows the recommended guidelines of the American Thy[employer] Association, January, 2020.    Vit D, 25-Hydroxy 03/13/2025 10.0 (L)  30.0 - 100.0 ng/mL Final    Vitamin D guidelines established by Clinical Guidelines Subcommittee  of the Endocrine Society Task Force, 2011    Deficiency <20ng/ml   Insufficiency 20-30ng/ml   Sufficient  ng/ml    Office Visit on 03/13/2025   Component Date Value Ref Range Status    RESULT ALL_PRESC MEDS SP INSTRNS 03/13/2025 Not Applicable   Final    Amphetamine Quantification 03/13/2025 negative  100 ng/mL Final    Methamphetamine Quantification 03/13/2025 negative  100 ng/mL Final    Butalbital Quantification 03/13/2025 negative  200 ng/mL Final    Phenobarbital Quantification 03/13/2025 negative  200 ng/mL Final    Secobarbital Quantification 03/13/2025 negative  200 ng/mL Final    Alpha-Hydroxyalprazolam Quantifica* 03/13/2025 negative-I (A)  20 ng/mL Final    7-Amino-Clonazepam Quantification * 03/13/2025 negative  20 ng/mL Final    Nordiazepam Quantification 03/13/2025 negative  40 ng/mL Final    Temazepam Quantification 03/13/2025 negative  50 ng/mL Final    Oxazepam Quantification 03/13/2025 negative  40 ng/mL Final    Lorazepam Quantification 03/13/2025 negative  40 ng/mL Final    Gabapentin Quantification 03/13/2025 negative  1000 ng/mL Final    PREGABALIN QUANTIFICATION 03/13/2025 negative  400 ng/mL Final    Cocaine metabolite Quantification 03/13/2025 positive-78.471-I (A)  50 ng/mL Final    6-DAMON (Heroin metabolite) Quantifi* 03/13/2025 negative  10 ng/mL Final    Buprenorphine Quantification 03/13/2025 negative  5 ng/mL Final    Norbuprenorphine Quantification 03/13/2025 negative  20 ng/mL Final    Dextrorphan (Dextromethorphan meta* 03/13/2025 negative  50 ng/mL Final    Meperidine Quantification  03/13/2025 negative  50 ng/mL Final    Normeperidine Quantification 03/13/2025 negative  50 ng/mL Final    Naltrexone Quantification 03/13/2025 negative  10 ng/mL Final    NALTREXOL (NALTREXONE METABOLITE) * 03/13/2025 negative  10 ng/mL Final    Fentanyl Quantification 03/13/2025 negative  1 ng/mL Final    Norfentanyl Quantification 03/13/2025 negative  8 ng/mL Final    Acetyl fentanyl Quantification 03/13/2025 Fen Neg  2 ng/mL Final    Acetyl norfentanyl Quantification 03/13/2025 Fen Neg  5 ng/mL Final    Acryl fentanyl Quantification 03/13/2025 Fen Neg  1 ng/mL Final    Carfentanil Quantification 03/13/2025 Fen Neg  2 ng/mL Final    Para-fluorofentanyl Quantification 03/13/2025 Fen Neg  1 ng/mL Final    Methadone Quantification 03/13/2025 negative  100 ng/mL Final    EDDP (Methadone metabolite) Quanti* 03/13/2025 negative  100 ng/mL Final    Oxycodone Quantification 03/13/2025 negative  50 ng/mL Final    Noroxycodone Quantification 03/13/2025 negative  50 ng/mL Final    Oxymorphone Quantification 03/13/2025 negative  50 ng/mL Final    Tapentadol Quantification 03/13/2025 negative  50 ng/mL Final    cTHC (Marijuana metabolite) Quanti* 03/13/2025 negative  15 ng/mL Final    Tramadol Quantification 03/13/2025 negative  100 ng/mL Final    O-desmethyl-tramadol Quantification 03/13/2025 negative  100 ng/mL Final    N-DESMETHYL-TRAMADOL QUANTIFICATION 03/13/2025 negative  100 ng/mL Final    Codeine Quantification 03/13/2025 negative  50 ng/mL Final    Morphine Quantification 03/13/2025 negative  50 ng/mL Final    Hydrocodone Quantification 03/13/2025 negative  50 ng/mL Final    Norhydrocodone Quantification 03/13/2025 negative  50 ng/mL Final    Hydromorphone Quantification 03/13/2025 negative  50 ng/mL Final    EUTYLONE QUANTIFICATION 03/13/2025 negative  10 ng/mL Final    METHYLONE QUANTIFICATION 03/13/2025 negative  3 ng/mL Final    Ethyl Glucuronide Quantification 03/13/2025 positive-1936.062-I (A)  500 ng/mL Final     Ethyl Sulfate Quantification 03/13/2025 positive-708.056-I (A)  500 ng/mL Final    Mitragynine (Kratom alkaloid) Anival* 03/13/2025 negative  1 ng/mL Final    3-QD-Fqizvxdosoz (Kratom alkaloid)* 03/13/2025 negative  1 ng/mL Final    5F-ADB-M7 03/13/2025 negative  10 ng/mL Final    IR-NBLUEHCM-D3 METABOLITE QUANTIFI* 03/13/2025 negative  10 ng/mL Final    VPME-KLEGXGFX-B4 METABOLITE QUANTI* 03/13/2025 negative  10 ng/mL Final    KER886 metabolite Quantification 03/13/2025 negative  10 ng/mL Final    NWM000 metabolite Quantification 03/13/2025 negative  10 ng/mL Final    RCS4 METABOLITE QUANTIFICATION 03/13/2025 negative  10 ng/mL Final    XLR11/ METABOLITE QUANTIFICAT* 03/13/2025 negative  10 ng/mL Final    Methylphenidate Quantification 03/13/2025 negative  50 ng/mL Final    RITALINIC ACID QUANTIFICATION 03/13/2025 negative  50 ng/mL Final    PHENTERMINE QUANTIFICATION 03/13/2025 negative  50 ng/mL Final    OXIDANT 03/13/2025 normal-0  <200 ug/mL ug/mL Final    CREATININE 03/13/2025 normal-163.3  >20 mg/dL mg/dL Final    pH 03/13/2025 normal-6.7  4.5 - 9.5 Final    SPECIFIC GRAVITY URINE 03/13/2025 normal-1.016  1.003 - 1.035 Final    XYLAZINE 03/13/2025 negative  10 ng/mL Final    4-HYDROXY XYLAZINE 03/13/2025 negative  10 ng/mL Final   Admission on 01/25/2025, Discharged on 01/25/2025   Component Date Value Ref Range Status    SARS COV Rapid Antigen 01/25/2025 Negative  Negative Final    Influenza A Rapid Antigen 01/25/2025 Positive (A)  Negative Final    Influenza B Rapid Antigen 01/25/2025 Negative  Negative Final    EXT Preg Test, Ur 01/25/2025 Negative   Final    Control 01/25/2025 Valid   Final   Admission on 10/31/2024, Discharged on 11/03/2024   Component Date Value Ref Range Status    WBC 10/31/2024 10.09  4.31 - 10.16 Thousand/uL Final    RBC 10/31/2024 3.80 (L)  3.81 - 5.12 Million/uL Final    Hemoglobin 10/31/2024 10.1 (L)  11.5 - 15.4 g/dL Final    Hematocrit 10/31/2024 31.1 (L)  34.8 - 46.1 %  Final    MCV 10/31/2024 82  82 - 98 fL Final    MCH 10/31/2024 26.6 (L)  26.8 - 34.3 pg Final    MCHC 10/31/2024 32.5  31.4 - 37.4 g/dL Final    RDW 10/31/2024 14.1  11.6 - 15.1 % Final    Platelets 10/31/2024 242  149 - 390 Thousands/uL Final    MPV 10/31/2024 11.2  8.9 - 12.7 fL Final    Syphilis Total Antibody 10/31/2024 Non-reactive  Non-Reactive Final    No serological evidence of infection with T. pallidum.  Early or incubating syphilis infection cannot be excluded.  Consider repeat testing based on clinical suspicion.    Sodium 10/31/2024 136  135 - 147 mmol/L Final    Potassium 10/31/2024 3.8  3.5 - 5.3 mmol/L Final    Chloride 10/31/2024 107  96 - 108 mmol/L Final    CO2 10/31/2024 22  21 - 32 mmol/L Final    ANION GAP 10/31/2024 7  4 - 13 mmol/L Final    BUN 10/31/2024 11  5 - 25 mg/dL Final    Creatinine 10/31/2024 0.65  0.60 - 1.30 mg/dL Final    Standardized to IDMS reference method    Glucose 10/31/2024 115  65 - 140 mg/dL Final    If the patient is fasting, the ADA then defines impaired fasting glucose as > 100 mg/dL and diabetes as > or equal to 123 mg/dL.    Calcium 10/31/2024 9.5  8.4 - 10.2 mg/dL Final    Corrected Calcium 10/31/2024 10.1  8.3 - 10.1 mg/dL Final    AST 10/31/2024 13  13 - 39 U/L Final    ALT 10/31/2024 8  7 - 52 U/L Final    Specimen collection should occur prior to Sulfasalazine administration due to the potential for falsely depressed results.     Alkaline Phosphatase 10/31/2024 134 (H)  34 - 104 U/L Final    Total Protein 10/31/2024 6.4  6.4 - 8.4 g/dL Final    Albumin 10/31/2024 3.2 (L)  3.5 - 5.0 g/dL Final    Total Bilirubin 10/31/2024 0.25  0.20 - 1.00 mg/dL Final    Use of this assay is not recommended for patients undergoing treatment with eltrombopag due to the potential for falsely elevated results.  N-acetyl-p-benzoquinone imine (metabolite of Acetaminophen) will generate erroneously low results in samples for patients that have taken an overdose of Acetaminophen.     eGFR 10/31/2024 121  ml/min/1.73sq m Final    Creatinine, Ur 10/31/2024 166.9  Reference range not established. mg/dL Final    Protein Urine Random 10/31/2024 65.0  Reference range not established. mg/dL Final    Prot/Creat Ratio, Ur 10/31/2024 0.4 (H)  0.0 - 0.1 Final    Hemoglobin A1C 10/31/2024 5.9 (H)  Normal 4.0-5.6%; PreDiabetic 5.7-6.4%; Diabetic >=6.5%; Glycemic control for adults with diabetes <7.0% % Final    EAG 10/31/2024 123  mg/dl Final    ABO Grouping 10/31/2024 B   Final    Rh Factor 10/31/2024 Positive   Final    Antibody Screen 10/31/2024 Positive   Final    Patient is pregnant with a positive antibody screen. Antibody Identification and Antibody Titer have been ordered.    Specimen Expiration Date 10/31/2024 [id number]   Final    POC Glucose 10/31/2024 114  65 - 140 mg/dl Final    POC Glucose 10/31/2024 89  65 - 140 mg/dl Final    ANTIBODY ID. #1 10/31/2024 Anti-K   Final    ANTIBODY ID. #2 10/31/2024 Anti-M   Final    ANTIBODY ID. #3 10/31/2024 Anti-S   Final    POC Glucose 10/31/2024 79  65 - 140 mg/dl Final    POC Glucose 10/31/2024 84  65 - 140 mg/dl Final    pH, Cord Art 11/01/2024 7.251  7.230 - 7.430 Final    pCO2, Cord Art 11/01/2024 56.6  30.0 - 60.0 Final    pO2, Cord Art 11/01/2024 17.7  5.0 - 25.0 mm HG Final    HCO3, Cord Art 11/01/2024 24.3  17.3 - 27.3 mmol/L Final    Base Exc, Cord Art 11/01/2024 -3.9 (L)  3.0 - 11.0 mmol/L Final    O2 Content, Cord Art 11/01/2024 8.3  ml/dl Final    O2 Hgb, Arterial Cord 11/01/2024 37.4  % Final    pH, Cord Kam 11/01/2024 7.343  7.190 - 7.490 Final    pCO2, Cord Kam 11/01/2024 40.7  27.0 - 43.0 mm HG Final    pO2, Cord Kam 11/01/2024 23.8  15.0 - 45.0 mm HG Final    HCO3, Cord Kam 11/01/2024 21.6  12.2 - 28.6 mmol/L Final    Base Exc, Cord Kam 11/01/2024 -3.8 (L)  1.0 - 9.0 mmol/L Final    O2 Cont, Cord Kam 11/01/2024 12.9  mL/dL Final    O2 HGB,VENOUS CORD 11/01/2024 60.6  % Final    POC Glucose 10/31/2024 74  65 - 140 mg/dl Final    Case Report  2024    Final                    Value:Surgical Pathology Report                         Case: [id number]                                  Authorizing Provider:  Olena Eckert MD       Collected:           2024 0144              Ordering Location:     [facility]        Received:            2024 [address] [place] [address] Labor and                                                                                 Delivery                                                                     Pathologist:           Cody Marley MD                                                           Specimen:    Placenta, FOR PATHOLOGY                                                                    Final Diagnosis 2024    Final                    Value:A. Placenta (441g),  section delivery:  - Intact mature placenta with eccentrically inserted, three-vessel umbilical cord  - No evidence of chorioamnionitis, umbilical vasculitis, funisitis, or villitis  - Placental size and weight about 25th percentile for gestational age of 38 weeks and 3 days with age-appropriate and uniform villous morphology  - Placental infarcts not present  - No fetal or maternal vascular abnormality or thrombosis  - No malformative condition and no evidence of neoplasia            Additional Information 2024    Final                    Value:All reported additional testing was performed with appropriately reactive controls.  These tests were developed and their performance characteristics determined by [facility] or appropriate performing facility, though some tests may be performed on tissues which have not been validated for performance characteristics (such as staining performed on alcohol exposed cell blocks and decalcified tissues).  Results should be interpreted with caution and in the context of the patients’ clinical condition. These tests may not be  "cleared or approved by the U.S. Food and Drug Administration, though the FDA has determined that such clearance or approval is not necessary. These tests are used for clinical purposes and they should not be regarded as investigational or for research. This laboratory has been approved by CLIA 88, designated as a high-complexity laboratory and is qualified to perform these tests.  .Interpretation performed at [facility], [address] [facility] [address]        Gross Description 11/01/2024    Final                    Value:A. The specimen is received in formalin, labeled with the patient's name and hospital number, and is designated \" placenta for pathology.\"  It consists of a 441 g (trimmed weight), 18.5 x 16 x 2.5 cm placenta with attached fetal membranes and umbilical cord.  The umbilical cord is eccentrically inserted 3 cm from the disc edge and is white-tan, glistening, and normally coiled.  It measures 17.5 cm in length, 1.4-2.3 cm in diameter and displays 3 vessels upon sectioning.  The fetal membranes are marginally inserted and are pink-tan, thin, and semitranslucent.  The fetal surface is blue-purple and well arborized with small caliber vessels.  The maternal surface is red-purple with no areas of disruption or adherent blood clot.  Sectioning reveals pink to pink-red, soft, spongy to dense parenchyma with no apparent lesions.  Representative sections are submitted as follows:  1: Umbilical cord  2: Fetal membranes  3-4: Full-thickness sections    Note: The estimated total formalin fixation                           time based upon information provided by the submitting clinician and the standard processing schedule is under 72 hours.    MScheib      WBC 11/01/2024 13.61 (H)  4.31 - 10.16 Thousand/uL Final    RBC 11/01/2024 3.20 (L)  3.81 - 5.12 Million/uL Final    Hemoglobin 11/01/2024 8.5 (L)  11.5 - 15.4 g/dL Final    Hematocrit 11/01/2024 26.6 (L)  34.8 - 46.1 % Final    " MCV 11/01/2024 83  82 - 98 fL Final    MCH 11/01/2024 26.6 (L)  26.8 - 34.3 pg Final    MCHC 11/01/2024 32.0  31.4 - 37.4 g/dL Final    RDW 11/01/2024 14.1  11.6 - 15.1 % Final    MPV 11/01/2024 10.8  8.9 - 12.7 fL Final    Platelets 11/01/2024 218  149 - 390 Thousands/uL Final    nRBC 11/01/2024 0  /100 WBCs Final    Segmented % 11/01/2024 79 (H)  43 - 75 % Final    Immature Grans % 11/01/2024 1  0 - 2 % Final    Lymphocytes % 11/01/2024 13 (L)  14 - 44 % Final    Monocytes % 11/01/2024 7  4 - 12 % Final    Eosinophils Relative 11/01/2024 0  0 - 6 % Final    Basophils Relative 11/01/2024 0  0 - 1 % Final    Absolute Neutrophils 11/01/2024 10.67 (H)  1.85 - 7.62 Thousands/µL Final    Absolute Immature Grans 11/01/2024 0.09  0.00 - 0.20 Thousand/uL Final    Absolute Lymphocytes 11/01/2024 1.81  0.60 - 4.47 Thousands/µL Final    Absolute Monocytes 11/01/2024 0.99  0.17 - 1.22 Thousand/µL Final    Eosinophils Absolute 11/01/2024 0.01  0.00 - 0.61 Thousand/µL Final    Basophils Absolute 11/01/2024 0.04  0.00 - 0.10 Thousands/µL Final    Sodium 11/01/2024 135  135 - 147 mmol/L Final    Potassium 11/01/2024 4.3  3.5 - 5.3 mmol/L Final    Chloride 11/01/2024 106  96 - 108 mmol/L Final    CO2 11/01/2024 24  21 - 32 mmol/L Final    ANION GAP 11/01/2024 5  4 - 13 mmol/L Final    BUN 11/01/2024 12  5 - 25 mg/dL Final    Creatinine 11/01/2024 0.73  0.60 - 1.30 mg/dL Final    Standardized to IDMS reference method    Glucose 11/01/2024 110  65 - 140 mg/dL Final    If the patient is fasting, the ADA then defines impaired fasting glucose as > 100 mg/dL and diabetes as > or equal to 123 mg/dL.    Calcium 11/01/2024 8.6  8.4 - 10.2 mg/dL Final    Corrected Calcium 11/01/2024 9.6  8.3 - 10.1 mg/dL Final    AST 11/01/2024 13  13 - 39 U/L Final    ALT 11/01/2024 6 (L)  7 - 52 U/L Final    Specimen collection should occur prior to Sulfasalazine administration due to the potential for falsely depressed results.     Alkaline Phosphatase  11/01/2024 103  34 - 104 U/L Final    Total Protein 11/01/2024 5.5 (L)  6.4 - 8.4 g/dL Final    Albumin 11/01/2024 2.8 (L)  3.5 - 5.0 g/dL Final    Total Bilirubin 11/01/2024 0.28  0.20 - 1.00 mg/dL Final    Use of this assay is not recommended for patients undergoing treatment with eltrombopag due to the potential for falsely elevated results.  N-acetyl-p-benzoquinone imine (metabolite of Acetaminophen) will generate erroneously low results in samples for patients that have taken an overdose of Acetaminophen.    eGFR 11/01/2024 112  ml/min/1.73sq m Final    WBC 11/02/2024 12.76 (H)  4.31 - 10.16 Thousand/uL Final    RBC 11/02/2024 3.28 (L)  3.81 - 5.12 Million/uL Final    Hemoglobin 11/02/2024 8.8 (L)  11.5 - 15.4 g/dL Final    Hematocrit 11/02/2024 27.0 (L)  34.8 - 46.1 % Final    MCV 11/02/2024 82  82 - 98 fL Final    MCH 11/02/2024 26.8  26.8 - 34.3 pg Final    MCHC 11/02/2024 32.6  31.4 - 37.4 g/dL Final    RDW 11/02/2024 14.1  11.6 - 15.1 % Final    Platelets 11/02/2024 261  149 - 390 Thousands/uL Final    MPV 11/02/2024 10.8  8.9 - 12.7 fL Final    Sodium 11/02/2024 135  135 - 147 mmol/L Final    Potassium 11/02/2024 4.1  3.5 - 5.3 mmol/L Final    Chloride 11/02/2024 104  96 - 108 mmol/L Final    CO2 11/02/2024 26  21 - 32 mmol/L Final    ANION GAP 11/02/2024 5  4 - 13 mmol/L Final    BUN 11/02/2024 11  5 - 25 mg/dL Final    Creatinine 11/02/2024 0.62  0.60 - 1.30 mg/dL Final    Standardized to IDMS reference method    Glucose 11/02/2024 109  65 - 140 mg/dL Final    If the patient is fasting, the ADA then defines impaired fasting glucose as > 100 mg/dL and diabetes as > or equal to 123 mg/dL.    Calcium 11/02/2024 8.9  8.4 - 10.2 mg/dL Final    Corrected Calcium 11/02/2024 9.6  8.3 - 10.1 mg/dL Final    AST 11/02/2024 16  13 - 39 U/L Final    ALT 11/02/2024 7  7 - 52 U/L Final    Specimen collection should occur prior to Sulfasalazine administration due to the potential for falsely depressed results.      Alkaline Phosphatase 11/02/2024 101  34 - 104 U/L Final    Total Protein 11/02/2024 6.2 (L)  6.4 - 8.4 g/dL Final    Albumin 11/02/2024 3.1 (L)  3.5 - 5.0 g/dL Final    Total Bilirubin 11/02/2024 0.21  0.20 - 1.00 mg/dL Final    Use of this assay is not recommended for patients undergoing treatment with eltrombopag due to the potential for falsely elevated results.  N-acetyl-p-benzoquinone imine (metabolite of Acetaminophen) will generate erroneously low results in samples for patients that have taken an overdose of Acetaminophen.    eGFR 11/02/2024 123  ml/min/1.73sq m Final         Risks/Benefits      Risks, Benefits And Possible Side Effects Of Medications:    Risks, benefits, and possible side effects of medications explained to Mone and she verbalizes understanding and agreement for treatment.    Controlled Medication Discussion:     Mone has been filling controlled prescriptions on time as prescribed according to [place] Prescription Drug Monitoring Program    Psychotherapy Provided:     Individual psychotherapy provided: Crisis/safety plan discussed with Mone.    Treatment Plan:    Completed and signed during the session: Yes - with Mone Stock MD 04/16/25    This note has been constructed using a voice recognition system. There may be translation, syntax, or grammatical errors. If you have any questions, please contact the dictating provider.

## 2025-04-14 NOTE — ASSESSMENT & PLAN NOTE
Patient was previously prescribed Xanax which was managed here she had interruption in prescription because of pregnancy  Patient also had interruption of medication due to lack of follow-up  Patient had a follow-up appointment in which a [facility] drug test was performed  We discussed the results in detail patient was positive for illicit use of cocaine  Patient reports that she did consume alcohol.  She reports that she does not consume any illegal drugs there is a history of drug use in her family and she is not interested in illicit drug use  Patient reports that her significant other at times does  She is questioning whether sexual exposure would have tested positive  Discussed with patient that this is a metabolite that is tested which is a result from ingestion  Did review with the patient that this is a breech of her sign contract even with the alcohol use  Reviewed with patient that there is a potential to restart the prescription however we need a serial of random urine drug test that remain consistent with a number of serial exams would be up to my discretion  She was advised that should there be any substances present in her urine side of the prescribed medication would be a breach including alcohol and marijuana therefore no further medications would be prescribed or considered  Orders:  •  [facility] All Prescribed Meds and Special Instructions  •  Amphetamines, Methamphetamines  •  Butalbital  •  Phenobarbital  •  Secobarbital  •  Alprazolam  •  Clonazepam  •  Diazepam, Temazepam, Oxazepam  •  Lorazepam  •  Gabapentin  •  Pregabalin  •  Cocaine  •  Heroin  •  Buprenorphine  •  Levorphanol  •  Meperidine  •  Naltrexone  •  Fentanyl  •  Methadone  •  Oxycodone  •  Tapentadol  •  THC  •  Tramadol  •  Codeine, Hydrocodone, Hydropmorphone, Morphine  •  Bath Salts  •  Ethyl Glucuronide/Ethyl Sulfate  •  Kratom  •  Spice  •  Methylphenidate  •  Phentermine  •  Validity Oxidant  •  Validity Creatinine  •   Validity pH  •  Validity Specific  •  Xylazine Definitive Test

## 2025-04-14 NOTE — PROGRESS NOTES
Name: Mone Wilhelm      : 1995      MRN: [id number]  Encounter Provider: HALLIE Rios  Encounter Date: 2025   Encounter department: [facility]  :  Assessment & Plan  Sore throat  Rapid strep was negative recommend patient start allergy medication and do salt water gargles her exam is benign  Orders:  •  POCT rapid ANTIGEN strepA  •  acetaminophen (TYLENOL) 325 mg tablet; Take 2 tablets (650 mg total) by mouth every 6 (six) hours    Anxiety  Patient was previously prescribed Xanax which was managed here she had interruption in prescription because of pregnancy  Patient also had interruption of medication due to lack of follow-up  Patient had a follow-up appointment [facility] which [facility]  We discussed the results in detail patient was positive for illicit use of cocaine  Patient reports that she did consume alcohol.  She reports that she does not consume any illegal drugs there is a history of drug use in her family and she is not interested in illicit drug use  Patient reports that her significant other at times does  She is questioning whether sexual exposure would have tested positive  Discussed with patient that this is a metabolite that is tested which is a result from ingestion  Did review with the patient that this is a breech of her sign contract even with the alcohol use  Reviewed with patient that there is a potential to restart the prescription however we need a serial of random urine drug test that remain consistent with a number of serial exams would be up to my discretion  She was advised that should there be any substances present in her urine side of the prescribed medication would be a breach including alcohol and marijuana therefore no further medications would be prescribed or considered  Orders:  •  [facility] All Prescribed Meds and Special Instructions  •  Amphetamines, Methamphetamines  •  Butalbital  •  Phenobarbital  •  " Secobarbital  •  Alprazolam  •  Clonazepam  •  Diazepam, Temazepam, Oxazepam  •  Lorazepam  •  Gabapentin  •  Pregabalin  •  Cocaine  •  Heroin  •  Buprenorphine  •  Levorphanol  •  Meperidine  •  Naltrexone  •  Fentanyl  •  Methadone  •  Oxycodone  •  Tapentadol  •  THC  •  Tramadol  •  Codeine, Hydrocodone, Hydropmorphone, Morphine  •  Bath Salts  •  Ethyl Glucuronide/Ethyl Sulfate  •  Kratom  •  Spice  •  Methylphenidate  •  Phentermine  •  Validity Oxidant  •  Validity Creatinine  •  Validity pH  •  Validity Specific  •  Xylazine Definitive Test    Post-nasal drip  Prescription for antihistamine sent  Orders:  •  diphenhydrAMINE (BENADRYL) 50 mg capsule; Take 1 capsule (50 mg total) by mouth every 6 (six) hours as needed for itching  •  cetirizine (ZyrTEC) 10 mg tablet; Take 1 tablet (10 mg total) by mouth daily           History of Present Illness   Patient presents today for follow up  She states that she had on and off left throat pain that radiates into the ear. She did not take anything for this except cough drops   She works at the ER now      She is here to also review her labs         Review of Systems   Constitutional: Negative.    HENT:  Positive for postnasal drip and sore throat.    Neurological:  Negative for dizziness and facial asymmetry.   Psychiatric/Behavioral: Negative.         Objective   /90 (BP Location: Left arm, Patient Position: Sitting, Cuff Size: Adult)   Pulse 80   Temp 98.4 °F (36.9 °C) (Tympanic)   Resp 18   Ht 4' 11\" (1.499 m)   Wt 87.1 kg (192 lb)   SpO2 98%   BMI 38.78 kg/m²      Physical Exam  Vitals and nursing note reviewed.   Constitutional:       General: She is not in acute distress.     Appearance: Normal appearance. She is obese. She is not ill-appearing or diaphoretic.   HENT:      Head: Normocephalic and atraumatic.      Right Ear: External ear normal.      Left Ear: External ear normal.   Eyes:      Conjunctiva/sclera: Conjunctivae normal. "   Cardiovascular:      Rate and Rhythm: Normal rate and regular rhythm.      Heart sounds: Normal heart sounds, S1 normal and S2 normal. No murmur heard.  Pulmonary:      Effort: Pulmonary effort is normal.      Breath sounds: Normal breath sounds.   Neurological:      Mental Status: She is alert and oriented to person, place, and time.   Psychiatric:         Mood and Affect: Mood is anxious.         Behavior: Behavior normal.         Thought Content: Thought content normal.         Judgment: Judgment normal.

## 2025-04-14 NOTE — BH TREATMENT PLAN
TREATMENT PLAN - Medication Management        [facility]    Name and Date of Birth:  Mone Wilhelm 30 y.o. 1995  Date of Treatment Plan: April 16, 2025  Diagnosis/Diagnoses:    1. VINEET (generalized anxiety disorder)    2. Recurrent major depressive disorder, in partial remission (HCC)          Strengths/Personal Resources for Self-Care: supportive family, ability to communicate needs, ability to understand psychiatric illness, average or above intelligence, general fund of knowledge, ability to negotiate basic needs, stable employment, willingness to work on problems    Area/Areas of need: anxiety symptoms, depressive symptoms    Long Term Goal:  control my anxiety a little more  Target Date: 6 months - October 16, 2025  Person/Persons responsible for completion of goal: Mone and Orlando Stock MD     Short Term Objective (s) - How will we reach this goal?:   Take medications as prescribed  Attend psychiatry appointments regularly  Follow up with medical providers  Target Date: 3 months  Person/Persons Responsible for Completion of Goal: Mone     Progress Towards Goals: Continuing treatment    Treatment Modality: medication management every 1-3 months as needed  Review due 180 days from date of this plan: October 13, 2025   Expected length of service: Ongoing treatment    My physician and I have developed this plan together, and I agree to work on the goals and objectives. I understand the treatment goals that were developed for my treatment.    The treatment plan was created between rOlando Stock MD and Mone Wilhelm on 04/16/25 at 3:01 PM.

## 2025-04-15 ENCOUNTER — PATIENT MESSAGE (OUTPATIENT)
Dept: FAMILY MEDICINE CLINIC | Facility: CLINIC | Age: 30
End: 2025-04-15

## 2025-04-15 DIAGNOSIS — R11.0 NAUSEA: Primary | ICD-10-CM

## 2025-04-16 ENCOUNTER — TELEPHONE (OUTPATIENT)
Dept: PSYCHIATRY | Facility: CLINIC | Age: 30
End: 2025-04-16

## 2025-04-16 ENCOUNTER — TELEMEDICINE (OUTPATIENT)
Dept: PSYCHIATRY | Facility: CLINIC | Age: 30
End: 2025-04-16
Payer: COMMERCIAL

## 2025-04-16 DIAGNOSIS — F41.1 GAD (GENERALIZED ANXIETY DISORDER): Primary | ICD-10-CM

## 2025-04-16 DIAGNOSIS — F33.41 RECURRENT MAJOR DEPRESSIVE DISORDER, IN PARTIAL REMISSION (HCC): ICD-10-CM

## 2025-04-16 LAB
4OH-XYLAZINE UR QL CFM: NEGATIVE NG/ML
6MAM UR QL CFM: NEGATIVE NG/ML
7AMINOCLONAZEPAM UR QL CFM: NEGATIVE NG/ML
A-OH ALPRAZ UR QL CFM: ABNORMAL NG/ML
ACCEPTABLE CREAT UR QL: NORMAL MG/DL
ACCEPTIBLE SP GR UR QL: NORMAL
AMPHET UR QL CFM: NEGATIVE NG/ML
BUPRENORPHINE UR QL CFM: NEGATIVE NG/ML
BUTALBITAL UR QL CFM: NEGATIVE NG/ML
BZE UR QL CFM: NEGATIVE NG/ML
CODEINE UR QL CFM: NEGATIVE NG/ML
EDDP UR QL CFM: NEGATIVE NG/ML
ETHYL GLUCURONIDE UR QL CFM: NEGATIVE NG/ML
ETHYL SULFATE UR QL SCN: NEGATIVE NG/ML
EUTYLONE UR QL: NEGATIVE NG/ML
FENTANYL UR QL CFM: NEGATIVE NG/ML
GLIADIN IGG SER IA-ACNC: NEGATIVE NG/ML
HYDROCODONE UR QL CFM: NEGATIVE NG/ML
HYDROMORPHONE UR QL CFM: NEGATIVE NG/ML
LORAZEPAM UR QL CFM: NEGATIVE NG/ML
ME-PHENIDATE UR QL CFM: NEGATIVE NG/ML
MEPERIDINE UR QL CFM: NEGATIVE NG/ML
METHADONE UR QL CFM: NEGATIVE NG/ML
METHAMPHET UR QL CFM: NEGATIVE NG/ML
MORPHINE UR QL CFM: NEGATIVE NG/ML
NALTREXONE UR QL CFM: NEGATIVE NG/ML
NITRITE UR QL: NORMAL UG/ML
NORBUPRENORPHINE UR QL CFM: NEGATIVE NG/ML
NORDIAZEPAM UR QL CFM: NEGATIVE NG/ML
NORFENTANYL UR QL CFM: NEGATIVE NG/ML
NORHYDROCODONE UR QL CFM: NEGATIVE NG/ML
NORMEPERIDINE UR QL CFM: NEGATIVE NG/ML
NOROXYCODONE UR QL CFM: NEGATIVE NG/ML
OXAZEPAM UR QL CFM: NEGATIVE NG/ML
OXYCODONE UR QL CFM: NEGATIVE NG/ML
OXYMORPHONE UR QL CFM: NEGATIVE NG/ML
PARA-FLUOROFENTANYL QUANTIFICATION: NORMAL NG/ML
PHENOBARB UR QL CFM: NEGATIVE NG/ML
RESULT ALL_PRESCRIBED MEDS AND SPECIAL INSTRUCTIONS: NORMAL
SECOBARBITAL UR QL CFM: NEGATIVE NG/ML
SL AMB 5F-ADB-M7 METABOLITE QUANTIFICATION: NEGATIVE NG/ML
SL AMB 7-OH-MITRAGYNINE (KRATOM ALKALOID) QUANTIFICATION: NEGATIVE NG/ML
SL AMB AB-FUBINACA-M3 METABOLITE QUANTIFICATION: NEGATIVE NG/ML
SL AMB ACETYL FENTANYL QUANTIFICATION: NORMAL NG/ML
SL AMB ACETYL NORFENTANYL QUANTIFICATION: NORMAL NG/ML
SL AMB ACRYL FENTANYL QUANTIFICATION: NORMAL NG/ML
SL AMB CARFENTANIL QUANTIFICATION: NORMAL NG/ML
SL AMB CTHC (MARIJUANA METABOLITE) QUANTIFICATION: NEGATIVE NG/ML
SL AMB DEXTRORPHAN (DEXTROMETHORPHAN METABOLITE) QUANT: ABNORMAL NG/ML
SL AMB GABAPENTIN QUANTIFICATION: NEGATIVE NG/ML
SL AMB JWH018 METABOLITE QUANTIFICATION: NEGATIVE NG/ML
SL AMB JWH073 METABOLITE QUANTIFICATION: NEGATIVE NG/ML
SL AMB MDMB-FUBINACA-M1 METABOLITE QUANTIFICATION: NEGATIVE NG/ML
SL AMB METHYLONE QUANTIFICATION: NEGATIVE NG/ML
SL AMB N-DESMETHYL-TRAMADOL QUANTIFICATION: NEGATIVE NG/ML
SL AMB PHENTERMINE QUANTIFICATION: NEGATIVE NG/ML
SL AMB PREGABALIN QUANTIFICATION: NEGATIVE NG/ML
SL AMB RCS4 METABOLITE QUANTIFICATION: NEGATIVE NG/ML
SL AMB RITALINIC ACID QUANTIFICATION: NEGATIVE NG/ML
SMOOTH MUSCLE AB TITR SER IF: NEGATIVE NG/ML
SPECIMEN DRAWN SERPL: NEGATIVE NG/ML
SPECIMEN PH ACCEPTABLE UR: NORMAL
TAPENTADOL UR QL CFM: NEGATIVE NG/ML
TEMAZEPAM UR QL CFM: NEGATIVE NG/ML
TRAMADOL UR QL CFM: NEGATIVE NG/ML
URATE/CREAT 24H UR: NEGATIVE NG/ML
XYLAZINE UR QL CFM: NEGATIVE NG/ML

## 2025-04-16 PROCEDURE — 99214 OFFICE O/P EST MOD 30 MIN: CPT

## 2025-04-16 NOTE — TELEPHONE ENCOUNTER
Writer called patient and LVM to call office to make f/u appt.  Transfer call to resident MR to make appt.     LS 4/16/25   Next appt 4-5 wk f/u

## 2025-04-18 ENCOUNTER — TELEPHONE (OUTPATIENT)
Dept: BARIATRICS | Facility: CLINIC | Age: 30
End: 2025-04-18

## 2025-04-22 RX ORDER — ONDANSETRON 4 MG/1
4 TABLET, FILM COATED ORAL EVERY 8 HOURS PRN
Qty: 20 TABLET | Refills: 0 | Status: SHIPPED | OUTPATIENT
Start: 2025-04-22

## 2025-05-09 ENCOUNTER — RESULTS FOLLOW-UP (OUTPATIENT)
Dept: FAMILY MEDICINE CLINIC | Facility: CLINIC | Age: 30
End: 2025-05-09

## 2025-05-22 DIAGNOSIS — J02.9 SORE THROAT: ICD-10-CM

## 2025-05-22 DIAGNOSIS — R11.0 NAUSEA: ICD-10-CM

## 2025-05-23 RX ORDER — ONDANSETRON 4 MG/1
TABLET, FILM COATED ORAL
Qty: 20 TABLET | Refills: 0 | Status: SHIPPED | OUTPATIENT
Start: 2025-05-23

## 2025-05-23 RX ORDER — ACETAMINOPHEN 325 MG/1
TABLET ORAL
Qty: 60 TABLET | Refills: 0 | Status: SHIPPED | OUTPATIENT
Start: 2025-05-23

## 2025-05-23 NOTE — TELEPHONE ENCOUNTER
Requested Prescriptions     Pending Prescriptions Disp Refills    acetaminophen (TYLENOL) 325 mg tablet [Pharmacy Med Name: ACETAMINOPHEN 325MG TABLETS] 60 tablet 0     Sig: TAKE 2 TABLETS(650 MG) BY MOUTH EVERY 6 HOURS    ondansetron (ZOFRAN) 4 mg tablet [Pharmacy Med Name: ONDANSETRON 4MG TABLETS] 20 tablet 0     Sig: TAKE 1 TABLET(4 MG) BY MOUTH EVERY 8 HOURS AS NEEDED FOR NAUSEA OR VOMITING

## 2025-05-27 ENCOUNTER — TELEPHONE (OUTPATIENT)
Age: 30
End: 2025-05-27

## 2025-05-27 DIAGNOSIS — F41.1 GAD (GENERALIZED ANXIETY DISORDER): ICD-10-CM

## 2025-05-27 DIAGNOSIS — F33.41 RECURRENT MAJOR DEPRESSIVE DISORDER, IN PARTIAL REMISSION (HCC): ICD-10-CM

## 2025-05-27 NOTE — TELEPHONE ENCOUNTER
Patient called and requested to schedule appt w/provider. She also wanted to put in a refill request after that. Please call back

## 2025-05-30 RX ORDER — ESCITALOPRAM OXALATE 20 MG/1
20 TABLET ORAL DAILY
Qty: 30 TABLET | Refills: 2 | Status: SHIPPED | OUTPATIENT
Start: 2025-05-30

## 2025-05-30 NOTE — TELEPHONE ENCOUNTER
Covering for primary psychiatrist Dr Stock. PDMP reviewed. Rx approved and sent to preferred pharmacy for #30 r-2 Lexapro 20 mg

## 2025-06-16 ENCOUNTER — TELEPHONE (OUTPATIENT)
Dept: PSYCHIATRY | Facility: CLINIC | Age: 30
End: 2025-06-16

## 2025-06-16 NOTE — TELEPHONE ENCOUNTER
Writer called patient and LVM to call office to make NADIRA appt.  Transfer call to resident MR to make appt.     Next appt available to any new resident

## 2025-06-18 ENCOUNTER — TELEPHONE (OUTPATIENT)
Age: 30
End: 2025-06-18

## 2025-06-18 NOTE — TELEPHONE ENCOUNTER
Contacted patient in regards to scheduling NADIRA in [facility]. LVM to contact [phone] for scheduling    Patient will be NADIRA from  Dr. PATEL        to Dr. Maravilla  NADIRA scheduled for 7/10 at 10am VIRTUAL    Patient requested FEMALE provider moving forward  
No excercise/No sports/gym

## 2025-06-21 DIAGNOSIS — J02.9 SORE THROAT: ICD-10-CM

## 2025-06-21 DIAGNOSIS — R11.0 NAUSEA: ICD-10-CM

## 2025-06-23 RX ORDER — ONDANSETRON 4 MG/1
4 TABLET, FILM COATED ORAL EVERY 8 HOURS PRN
Qty: 20 TABLET | Refills: 3 | Status: SHIPPED | OUTPATIENT
Start: 2025-06-23

## 2025-06-23 RX ORDER — ACETAMINOPHEN 325 MG/1
650 TABLET ORAL EVERY 6 HOURS PRN
Qty: 60 TABLET | Refills: 3 | Status: SHIPPED | OUTPATIENT
Start: 2025-06-23

## 2025-07-10 ENCOUNTER — TELEMEDICINE (OUTPATIENT)
Dept: PSYCHIATRY | Facility: CLINIC | Age: 30
End: 2025-07-10

## 2025-07-10 DIAGNOSIS — Z91.199 NO-SHOW FOR APPOINTMENT: Primary | ICD-10-CM

## 2025-07-10 PROCEDURE — NOSHOW: Performed by: PSYCHIATRY & NEUROLOGY

## 2025-07-10 NOTE — PSYCH
No Call. No Show. No Charge    Mone Wilhelm no showed 07/10/25 appointment , staff will call to reschedule appointment     Treatment Plan not completed within required time limits due to: Mone Wilhelm no show appointment on 07/10/25

## 2025-07-15 ENCOUNTER — TELEPHONE (OUTPATIENT)
Dept: BARIATRICS | Facility: CLINIC | Age: 30
End: 2025-07-15

## 2025-07-25 DIAGNOSIS — J02.9 SORE THROAT: ICD-10-CM

## 2025-07-25 DIAGNOSIS — R11.0 NAUSEA: ICD-10-CM

## 2025-07-28 RX ORDER — ACETAMINOPHEN 325 MG/1
650 TABLET ORAL EVERY 6 HOURS PRN
Qty: 60 TABLET | Refills: 0 | Status: SHIPPED | OUTPATIENT
Start: 2025-07-28

## 2025-07-28 RX ORDER — ONDANSETRON 4 MG/1
4 TABLET, FILM COATED ORAL EVERY 8 HOURS PRN
Qty: 20 TABLET | Refills: 0 | Status: SHIPPED | OUTPATIENT
Start: 2025-07-28

## 2025-07-31 ENCOUNTER — PATIENT MESSAGE (OUTPATIENT)
Dept: FAMILY MEDICINE CLINIC | Facility: CLINIC | Age: 30
End: 2025-07-31